# Patient Record
Sex: MALE | Race: WHITE | HISPANIC OR LATINO | URBAN - METROPOLITAN AREA
[De-identification: names, ages, dates, MRNs, and addresses within clinical notes are randomized per-mention and may not be internally consistent; named-entity substitution may affect disease eponyms.]

---

## 2017-10-12 ENCOUNTER — INPATIENT (INPATIENT)
Facility: HOSPITAL | Age: 77
LOS: 2 days | Discharge: HOME | DRG: 308 | End: 2017-10-15
Attending: INTERNAL MEDICINE | Admitting: INTERNAL MEDICINE
Payer: MEDICARE

## 2017-10-12 VITALS
SYSTOLIC BLOOD PRESSURE: 161 MMHG | OXYGEN SATURATION: 96 % | RESPIRATION RATE: 29 BRPM | TEMPERATURE: 98 F | DIASTOLIC BLOOD PRESSURE: 104 MMHG | HEIGHT: 70 IN | WEIGHT: 182.98 LBS | HEART RATE: 158 BPM

## 2017-10-12 DIAGNOSIS — I50.9 HEART FAILURE, UNSPECIFIED: ICD-10-CM

## 2017-10-12 DIAGNOSIS — I10 ESSENTIAL (PRIMARY) HYPERTENSION: ICD-10-CM

## 2017-10-12 DIAGNOSIS — E11.9 TYPE 2 DIABETES MELLITUS WITHOUT COMPLICATIONS: ICD-10-CM

## 2017-10-12 DIAGNOSIS — J96.01 ACUTE RESPIRATORY FAILURE WITH HYPOXIA: ICD-10-CM

## 2017-10-12 DIAGNOSIS — R06.02 SHORTNESS OF BREATH: ICD-10-CM

## 2017-10-12 DIAGNOSIS — Z95.0 PRESENCE OF CARDIAC PACEMAKER: Chronic | ICD-10-CM

## 2017-10-12 DIAGNOSIS — Z95.5 PRESENCE OF CORONARY ANGIOPLASTY IMPLANT AND GRAFT: ICD-10-CM

## 2017-10-12 DIAGNOSIS — Z98.890 OTHER SPECIFIED POSTPROCEDURAL STATES: Chronic | ICD-10-CM

## 2017-10-12 DIAGNOSIS — I48.91 UNSPECIFIED ATRIAL FIBRILLATION: ICD-10-CM

## 2017-10-12 DIAGNOSIS — I95.9 HYPOTENSION, UNSPECIFIED: ICD-10-CM

## 2017-10-12 DIAGNOSIS — I50.23 ACUTE ON CHRONIC SYSTOLIC (CONGESTIVE) HEART FAILURE: ICD-10-CM

## 2017-10-12 DIAGNOSIS — F17.200 NICOTINE DEPENDENCE, UNSPECIFIED, UNCOMPLICATED: ICD-10-CM

## 2017-10-12 DIAGNOSIS — J44.9 CHRONIC OBSTRUCTIVE PULMONARY DISEASE, UNSPECIFIED: ICD-10-CM

## 2017-10-12 LAB
ALBUMIN SERPL ELPH-MCNC: 4 G/DL — SIGNIFICANT CHANGE UP (ref 3.3–5)
ALP SERPL-CCNC: 94 U/L — SIGNIFICANT CHANGE UP (ref 30–120)
ALT FLD-CCNC: 27 U/L DA — SIGNIFICANT CHANGE UP (ref 10–60)
ANION GAP SERPL CALC-SCNC: 13 MMOL/L — SIGNIFICANT CHANGE UP (ref 5–17)
APPEARANCE UR: CLEAR — SIGNIFICANT CHANGE UP
APTT BLD: 59.2 SEC — HIGH (ref 27.5–37.4)
AST SERPL-CCNC: 20 U/L — SIGNIFICANT CHANGE UP (ref 10–40)
BASE EXCESS BLDA CALC-SCNC: -1 MMOL/L — SIGNIFICANT CHANGE UP (ref -2–2)
BASOPHILS # BLD AUTO: 0.1 K/UL — SIGNIFICANT CHANGE UP (ref 0–0.2)
BASOPHILS NFR BLD AUTO: 0.9 % — SIGNIFICANT CHANGE UP (ref 0–2)
BILIRUB SERPL-MCNC: 1.7 MG/DL — HIGH (ref 0.2–1.2)
BILIRUB UR-MCNC: NEGATIVE — SIGNIFICANT CHANGE UP
BLOOD GAS SOURCE: SIGNIFICANT CHANGE UP
BUN SERPL-MCNC: 23 MG/DL — SIGNIFICANT CHANGE UP (ref 7–23)
CALCIUM SERPL-MCNC: 9.1 MG/DL — SIGNIFICANT CHANGE UP (ref 8.4–10.5)
CHLORIDE SERPL-SCNC: 105 MMOL/L — SIGNIFICANT CHANGE UP (ref 96–108)
CK MB BLD-MCNC: 2.5 % — SIGNIFICANT CHANGE UP (ref 0–3.5)
CK MB CFR SERPL CALC: 1.3 NG/ML — SIGNIFICANT CHANGE UP (ref 0–3.6)
CK SERPL-CCNC: 45 U/L — SIGNIFICANT CHANGE UP (ref 39–308)
CK SERPL-CCNC: 51 U/L — SIGNIFICANT CHANGE UP (ref 39–308)
CK SERPL-CCNC: 58 U/L — SIGNIFICANT CHANGE UP (ref 39–308)
CO2 SERPL-SCNC: 23 MMOL/L — SIGNIFICANT CHANGE UP (ref 22–31)
COLOR SPEC: YELLOW — SIGNIFICANT CHANGE UP
CREAT SERPL-MCNC: 1.68 MG/DL — HIGH (ref 0.5–1.3)
DIFF PNL FLD: NEGATIVE — SIGNIFICANT CHANGE UP
EOSINOPHIL # BLD AUTO: 0.4 K/UL — SIGNIFICANT CHANGE UP (ref 0–0.5)
EOSINOPHIL NFR BLD AUTO: 2.8 % — SIGNIFICANT CHANGE UP (ref 0–6)
GLUCOSE BLDC GLUCOMTR-MCNC: 162 MG/DL — HIGH (ref 70–99)
GLUCOSE SERPL-MCNC: 197 MG/DL — HIGH (ref 70–99)
GLUCOSE UR QL: NEGATIVE MG/DL — SIGNIFICANT CHANGE UP
HCO3 BLDA-SCNC: 24 MMOL/L — SIGNIFICANT CHANGE UP (ref 21–29)
HCT VFR BLD CALC: 52.8 % — HIGH (ref 39–50)
HGB BLD-MCNC: 17.7 G/DL — HIGH (ref 13–17)
HOROWITZ INDEX BLDA+IHG-RTO: 45 — SIGNIFICANT CHANGE UP
INR BLD: 3.35 RATIO — HIGH (ref 0.88–1.16)
KETONES UR-MCNC: NEGATIVE — SIGNIFICANT CHANGE UP
LEUKOCYTE ESTERASE UR-ACNC: NEGATIVE — SIGNIFICANT CHANGE UP
LYMPHOCYTES # BLD AUTO: 40.1 % — SIGNIFICANT CHANGE UP (ref 13–44)
LYMPHOCYTES # BLD AUTO: 5.7 K/UL — HIGH (ref 1–3.3)
MCHC RBC-ENTMCNC: 32.2 PG — SIGNIFICANT CHANGE UP (ref 27–34)
MCHC RBC-ENTMCNC: 33.6 GM/DL — SIGNIFICANT CHANGE UP (ref 32–36)
MCV RBC AUTO: 95.9 FL — SIGNIFICANT CHANGE UP (ref 80–100)
MONOCYTES # BLD AUTO: 1.1 K/UL — HIGH (ref 0–0.9)
MONOCYTES NFR BLD AUTO: 7.9 % — SIGNIFICANT CHANGE UP (ref 2–14)
NEUTROPHILS # BLD AUTO: 6.9 K/UL — SIGNIFICANT CHANGE UP (ref 1.8–7.4)
NEUTROPHILS NFR BLD AUTO: 48.3 % — SIGNIFICANT CHANGE UP (ref 43–77)
NITRITE UR-MCNC: NEGATIVE — SIGNIFICANT CHANGE UP
NT-PROBNP SERPL-SCNC: 6166 PG/ML — HIGH (ref 0–450)
PCO2 BLDA: 34 MMHG — SIGNIFICANT CHANGE UP (ref 32–46)
PH BLD: 7.44 — SIGNIFICANT CHANGE UP (ref 7.35–7.45)
PH UR: 5 — SIGNIFICANT CHANGE UP (ref 5–8)
PLATELET # BLD AUTO: 187 K/UL — SIGNIFICANT CHANGE UP (ref 150–400)
PO2 BLDA: 187 MMHG — HIGH (ref 74–108)
POTASSIUM SERPL-MCNC: 4.4 MMOL/L — SIGNIFICANT CHANGE UP (ref 3.5–5.3)
POTASSIUM SERPL-SCNC: 4.4 MMOL/L — SIGNIFICANT CHANGE UP (ref 3.5–5.3)
PROT SERPL-MCNC: 7.6 G/DL — SIGNIFICANT CHANGE UP (ref 6–8.3)
PROT UR-MCNC: 30 MG/DL
PROTHROM AB SERPL-ACNC: 37.4 SEC — HIGH (ref 9.8–12.7)
RBC # BLD: 5.51 M/UL — SIGNIFICANT CHANGE UP (ref 4.2–5.8)
RBC # FLD: 14 % — SIGNIFICANT CHANGE UP (ref 10.3–14.5)
SAO2 % BLDA: 99 % — HIGH (ref 92–96)
SODIUM SERPL-SCNC: 141 MMOL/L — SIGNIFICANT CHANGE UP (ref 135–145)
SP GR SPEC: 1.01 — SIGNIFICANT CHANGE UP (ref 1.01–1.02)
TROPONIN I SERPL-MCNC: 0 NG/ML — LOW (ref 0.02–0.06)
TROPONIN I SERPL-MCNC: 0.02 NG/ML — LOW (ref 0.02–0.06)
TROPONIN I SERPL-MCNC: 0.03 NG/ML — SIGNIFICANT CHANGE UP (ref 0.02–0.06)
UROBILINOGEN FLD QL: NEGATIVE MG/DL — SIGNIFICANT CHANGE UP
WBC # BLD: 14.3 K/UL — HIGH (ref 3.8–10.5)
WBC # FLD AUTO: 14.3 K/UL — HIGH (ref 3.8–10.5)

## 2017-10-12 PROCEDURE — 71010: CPT | Mod: 26

## 2017-10-12 PROCEDURE — 93010 ELECTROCARDIOGRAM REPORT: CPT | Mod: 76

## 2017-10-12 PROCEDURE — 99291 CRITICAL CARE FIRST HOUR: CPT

## 2017-10-12 PROCEDURE — 93306 TTE W/DOPPLER COMPLETE: CPT | Mod: 26

## 2017-10-12 PROCEDURE — 99223 1ST HOSP IP/OBS HIGH 75: CPT

## 2017-10-12 RX ORDER — LISINOPRIL 2.5 MG/1
5 TABLET ORAL DAILY
Qty: 0 | Refills: 0 | Status: DISCONTINUED | OUTPATIENT
Start: 2017-10-12 | End: 2017-10-15

## 2017-10-12 RX ORDER — METOPROLOL TARTRATE 50 MG
100 TABLET ORAL DAILY
Qty: 0 | Refills: 0 | Status: DISCONTINUED | OUTPATIENT
Start: 2017-10-12 | End: 2017-10-12

## 2017-10-12 RX ORDER — LISINOPRIL 2.5 MG/1
20 TABLET ORAL DAILY
Qty: 0 | Refills: 0 | Status: DISCONTINUED | OUTPATIENT
Start: 2017-10-12 | End: 2017-10-12

## 2017-10-12 RX ORDER — DEXTROSE 50 % IN WATER 50 %
12.5 SYRINGE (ML) INTRAVENOUS ONCE
Qty: 0 | Refills: 0 | Status: DISCONTINUED | OUTPATIENT
Start: 2017-10-12 | End: 2017-10-15

## 2017-10-12 RX ORDER — BUDESONIDE AND FORMOTEROL FUMARATE DIHYDRATE 160; 4.5 UG/1; UG/1
2 AEROSOL RESPIRATORY (INHALATION)
Qty: 0 | Refills: 0 | Status: DISCONTINUED | OUTPATIENT
Start: 2017-10-12 | End: 2017-10-15

## 2017-10-12 RX ORDER — SODIUM CHLORIDE 9 MG/ML
3 INJECTION INTRAMUSCULAR; INTRAVENOUS; SUBCUTANEOUS ONCE
Qty: 0 | Refills: 0 | Status: COMPLETED | OUTPATIENT
Start: 2017-10-12 | End: 2017-10-12

## 2017-10-12 RX ORDER — ATORVASTATIN CALCIUM 80 MG/1
80 TABLET, FILM COATED ORAL AT BEDTIME
Qty: 0 | Refills: 0 | Status: DISCONTINUED | OUTPATIENT
Start: 2017-10-12 | End: 2017-10-15

## 2017-10-12 RX ORDER — AMIODARONE HYDROCHLORIDE 400 MG/1
200 TABLET ORAL
Qty: 0 | Refills: 0 | Status: DISCONTINUED | OUTPATIENT
Start: 2017-10-12 | End: 2017-10-14

## 2017-10-12 RX ORDER — DIGOXIN 250 MCG
0.25 TABLET ORAL ONCE
Qty: 0 | Refills: 0 | Status: COMPLETED | OUTPATIENT
Start: 2017-10-12 | End: 2017-10-12

## 2017-10-12 RX ORDER — INSULIN LISPRO 100/ML
VIAL (ML) SUBCUTANEOUS
Qty: 0 | Refills: 0 | Status: DISCONTINUED | OUTPATIENT
Start: 2017-10-12 | End: 2017-10-15

## 2017-10-12 RX ORDER — METOPROLOL TARTRATE 50 MG
50 TABLET ORAL EVERY 12 HOURS
Qty: 0 | Refills: 0 | Status: DISCONTINUED | OUTPATIENT
Start: 2017-10-12 | End: 2017-10-13

## 2017-10-12 RX ORDER — AMIODARONE HYDROCHLORIDE 400 MG/1
100 TABLET ORAL
Qty: 0 | Refills: 0 | Status: DISCONTINUED | OUTPATIENT
Start: 2017-10-12 | End: 2017-10-12

## 2017-10-12 RX ORDER — SODIUM CHLORIDE 9 MG/ML
1000 INJECTION, SOLUTION INTRAVENOUS
Qty: 0 | Refills: 0 | Status: DISCONTINUED | OUTPATIENT
Start: 2017-10-12 | End: 2017-10-15

## 2017-10-12 RX ORDER — TIOTROPIUM BROMIDE 18 UG/1
1 CAPSULE ORAL; RESPIRATORY (INHALATION) DAILY
Qty: 0 | Refills: 0 | Status: DISCONTINUED | OUTPATIENT
Start: 2017-10-12 | End: 2017-10-15

## 2017-10-12 RX ORDER — GLUCAGON INJECTION, SOLUTION 0.5 MG/.1ML
1 INJECTION, SOLUTION SUBCUTANEOUS ONCE
Qty: 0 | Refills: 0 | Status: DISCONTINUED | OUTPATIENT
Start: 2017-10-12 | End: 2017-10-15

## 2017-10-12 RX ORDER — DEXTROSE 50 % IN WATER 50 %
25 SYRINGE (ML) INTRAVENOUS ONCE
Qty: 0 | Refills: 0 | Status: DISCONTINUED | OUTPATIENT
Start: 2017-10-12 | End: 2017-10-15

## 2017-10-12 RX ORDER — FUROSEMIDE 40 MG
80 TABLET ORAL ONCE
Qty: 0 | Refills: 0 | Status: COMPLETED | OUTPATIENT
Start: 2017-10-12 | End: 2017-10-12

## 2017-10-12 RX ORDER — ASPIRIN/CALCIUM CARB/MAGNESIUM 324 MG
81 TABLET ORAL DAILY
Qty: 0 | Refills: 0 | Status: DISCONTINUED | OUTPATIENT
Start: 2017-10-12 | End: 2017-10-15

## 2017-10-12 RX ORDER — FUROSEMIDE 40 MG
40 TABLET ORAL DAILY
Qty: 0 | Refills: 0 | Status: DISCONTINUED | OUTPATIENT
Start: 2017-10-12 | End: 2017-10-13

## 2017-10-12 RX ORDER — DEXTROSE 50 % IN WATER 50 %
1 SYRINGE (ML) INTRAVENOUS ONCE
Qty: 0 | Refills: 0 | Status: DISCONTINUED | OUTPATIENT
Start: 2017-10-12 | End: 2017-10-15

## 2017-10-12 RX ADMIN — SODIUM CHLORIDE 3 MILLILITER(S): 9 INJECTION INTRAMUSCULAR; INTRAVENOUS; SUBCUTANEOUS at 04:48

## 2017-10-12 RX ADMIN — Medication 50 MILLIGRAM(S): at 17:39

## 2017-10-12 RX ADMIN — ATORVASTATIN CALCIUM 80 MILLIGRAM(S): 80 TABLET, FILM COATED ORAL at 21:27

## 2017-10-12 RX ADMIN — Medication 0.25 MILLIGRAM(S): at 10:42

## 2017-10-12 RX ADMIN — AMIODARONE HYDROCHLORIDE 100 MILLIGRAM(S): 400 TABLET ORAL at 06:50

## 2017-10-12 RX ADMIN — Medication 40 MILLIGRAM(S): at 17:40

## 2017-10-12 RX ADMIN — Medication 80 MILLIGRAM(S): at 04:20

## 2017-10-12 RX ADMIN — Medication 81 MILLIGRAM(S): at 13:17

## 2017-10-12 NOTE — ED PROVIDER NOTE - PMH
Atrial fibrillation    Bronchitis    Cardiac arrhythmia    COPD (chronic obstructive pulmonary disease)    Diabetes    Hyperlipidemia    Pacemaker Asthma    Atrial fibrillation    Bronchitis    Cardiac arrhythmia    CHF (congestive heart failure)    COPD (chronic obstructive pulmonary disease)    Diabetes    Hyperlipidemia    MI, old    Pacemaker    Stented coronary artery

## 2017-10-12 NOTE — ED PROVIDER NOTE - MEDICAL DECISION MAKING DETAILS
Hugo yJovitao. M BIBEMS for SOB - 77 y.o. M BIBEMS for SOB - not relieved by inhalers for COPD - in ED noted to be in AFib with RVR in 160-170s and respiratory distress - responded well to Bipap/diltiazem - appears to be CHF exacerbation - to be admitted

## 2017-10-12 NOTE — CONSULT NOTE ADULT - PROBLEM SELECTOR RECOMMENDATION 2
due to non compliant to diet , underlying systolic dysfunction with afib with rapid ventricular rate , will increase amiodarone dose 200 mg po BID for 3 days then 200 mg po daily , add digoxin if heart rate continue to be high . IV diuresis with parameter
Cannot exclude HTN as precipitating factor, as well as a-fib. Routine diuresis as BP tolerates. Needs new TTE, last one on record was from 2015. Trops neg x1, trend. BNP: 6166. BP and HR control as necessary.
Cardiology f/u  Diurese  CXR

## 2017-10-12 NOTE — CONSULT NOTE ADULT - PROBLEM SELECTOR RECOMMENDATION 3
May have put patient into acute heart failure. Now rate controlled after IV cardizem. Continue to monitor. Start PO amio as patient takes this at home and it will help with rate/rhythm control, especially in setting of hypotension. May have put patient into acute heart failure. Now rate controlled after IV cardizem. Continue to monitor. Start PO amio as patient takes this at home and it will help with rate/rhythm control, especially in setting of hypotension. INR: 3.35.

## 2017-10-12 NOTE — ED PROVIDER NOTE - CRITICAL CARE PROVIDED
direct patient care (not related to procedure)/documentation/consult w/ pt's family directly relating to pts condition/additional history taking/interpretation of diagnostic studies/consultation with other physicians

## 2017-10-12 NOTE — CONSULT NOTE ADULT - PROBLEM SELECTOR RECOMMENDATION 3
will increase amiodarone dose , will add digoxin , continue anticoagulation , hold if INR >3.0 ,Target INR 2-3

## 2017-10-12 NOTE — PATIENT PROFILE ADULT. - PMH
Asthma    Atrial fibrillation    Bronchitis    Cardiac arrhythmia    CHF (congestive heart failure)    COPD (chronic obstructive pulmonary disease)    Diabetes    Hyperlipidemia    MI, old    Pacemaker    Stented coronary artery    Stroke

## 2017-10-12 NOTE — H&P ADULT - PROBLEM SELECTOR PLAN 2
iv lasix  check echo  [facility] - will get report  cardio eval noted  monitor on tele

## 2017-10-12 NOTE — CONSULT NOTE ADULT - ASSESSMENT
76 y/o M with a h/o COPD, current smoker, carotid stenosis, prior CVA, CAD (s/p PCI), CHF, PPM, a-fib (on coumadin), HTN, HLD, DM with acute hypoxic respiratory failure requiring BiPAP, acute decompensated heart failure, a-fib with fast rate, hypotension.    Patient has shown improvement with BiPAP application and diuresis.

## 2017-10-12 NOTE — CONSULT NOTE ADULT - PROBLEM SELECTOR RECOMMENDATION 4
no evidence of myocardial injury , his LV dysfunction possibly due to tachycardiac associated cardiomyopathy , will follow up echo , ekg
Cannot exclude relation to CCB and high dose diuretic. Continue to monitor for now. IVF with caution if necessary. F/u TTE.
Watch bp

## 2017-10-12 NOTE — CONSULT NOTE ADULT - PROBLEM SELECTOR PROBLEM 2
Acute on chronic systolic congestive heart failure
Acute decompensated heart failure
Acute decompensated heart failure

## 2017-10-12 NOTE — CONSULT NOTE ADULT - ASSESSMENT
76 y/o M with a h/o COPD, current smoker, carotid stenosis, prior CVA, CAD (s/p PCI), CHF, PPM, a-fib (on coumadin), HTN, HLD, DM with acute hypoxic respiratory failure requiring BiPAP, acute decompensated heart failure, a-fib with RVR, hypotension.    Patient has shown improvement with BiPAP application and diuresis. Has not been in ED long- will reevaluate and trial off BiPAP. May not require SPCU admission.

## 2017-10-12 NOTE — ED PROVIDER NOTE - OBJECTIVE STATEMENT
77 y.o. M BIBEMS for difficulty breathing - pt with h/o COPD and Afib, never CHF 77 y.o. M BIBEMS for difficulty breathing - pt with h/o COPD and Afib, never CHF per wife - pt unable to provide history due to respiratory distress - pt was asleep, awoke with dyspnea - used his spiriva and albuterol, not improving, called 911 - EMS gave atrovent and O2 without improvement - pt c/o difficulty breathing and needing to have BM. [facility]

## 2017-10-12 NOTE — ED ADULT NURSE NOTE - PMH
Bronchitis    Cardiac arrhythmia    COPD (chronic obstructive pulmonary disease)    Diabetes    Hyperlipidemia    Pacemaker Asthma    Atrial fibrillation    Bronchitis    Cardiac arrhythmia    COPD (chronic obstructive pulmonary disease)    Diabetes    Hyperlipidemia    Pacemaker Asthma    Atrial fibrillation    Bronchitis    Cardiac arrhythmia    COPD (chronic obstructive pulmonary disease)    Diabetes    Hyperlipidemia    MI, old    Pacemaker

## 2017-10-12 NOTE — ED PROVIDER NOTE - CARE PLAN
Principal Discharge DX:	Acute congestive heart failure, unspecified congestive heart failure type  Secondary Diagnosis:	Atrial fibrillation with RVR

## 2017-10-13 DIAGNOSIS — I25.10 ATHEROSCLEROTIC HEART DISEASE OF NATIVE CORONARY ARTERY WITHOUT ANGINA PECTORIS: ICD-10-CM

## 2017-10-13 LAB
ALBUMIN SERPL ELPH-MCNC: 3.5 G/DL — SIGNIFICANT CHANGE UP (ref 3.3–5)
ALP SERPL-CCNC: 79 U/L — SIGNIFICANT CHANGE UP (ref 30–120)
ALT FLD-CCNC: 20 U/L DA — SIGNIFICANT CHANGE UP (ref 10–60)
ANION GAP SERPL CALC-SCNC: 10 MMOL/L — SIGNIFICANT CHANGE UP (ref 5–17)
AST SERPL-CCNC: 17 U/L — SIGNIFICANT CHANGE UP (ref 10–40)
BILIRUB SERPL-MCNC: 2.3 MG/DL — HIGH (ref 0.2–1.2)
BUN SERPL-MCNC: 29 MG/DL — HIGH (ref 7–23)
CALCIUM SERPL-MCNC: 8.8 MG/DL — SIGNIFICANT CHANGE UP (ref 8.4–10.5)
CHLORIDE SERPL-SCNC: 104 MMOL/L — SIGNIFICANT CHANGE UP (ref 96–108)
CK SERPL-CCNC: 55 U/L — SIGNIFICANT CHANGE UP (ref 39–308)
CO2 SERPL-SCNC: 25 MMOL/L — SIGNIFICANT CHANGE UP (ref 22–31)
CREAT SERPL-MCNC: 1.73 MG/DL — HIGH (ref 0.5–1.3)
CULTURE RESULTS: SIGNIFICANT CHANGE UP
GLUCOSE BLDC GLUCOMTR-MCNC: 146 MG/DL — HIGH (ref 70–99)
GLUCOSE BLDC GLUCOMTR-MCNC: 177 MG/DL — HIGH (ref 70–99)
GLUCOSE BLDC GLUCOMTR-MCNC: 201 MG/DL — HIGH (ref 70–99)
GLUCOSE BLDC GLUCOMTR-MCNC: 362 MG/DL — HIGH (ref 70–99)
GLUCOSE BLDC GLUCOMTR-MCNC: 365 MG/DL — HIGH (ref 70–99)
GLUCOSE SERPL-MCNC: 183 MG/DL — HIGH (ref 70–99)
HBA1C BLD-MCNC: 6.4 % — HIGH (ref 4–5.6)
HCT VFR BLD CALC: 52.8 % — HIGH (ref 39–50)
HGB BLD-MCNC: 16.6 G/DL — SIGNIFICANT CHANGE UP (ref 13–17)
INR BLD: 2.86 RATIO — HIGH (ref 0.88–1.16)
MAGNESIUM SERPL-MCNC: 2.1 MG/DL — SIGNIFICANT CHANGE UP (ref 1.6–2.6)
MCHC RBC-ENTMCNC: 30.8 PG — SIGNIFICANT CHANGE UP (ref 27–34)
MCHC RBC-ENTMCNC: 31.5 GM/DL — LOW (ref 32–36)
MCV RBC AUTO: 97.8 FL — SIGNIFICANT CHANGE UP (ref 80–100)
PLATELET # BLD AUTO: 153 K/UL — SIGNIFICANT CHANGE UP (ref 150–400)
POTASSIUM SERPL-MCNC: 4.3 MMOL/L — SIGNIFICANT CHANGE UP (ref 3.5–5.3)
POTASSIUM SERPL-SCNC: 4.3 MMOL/L — SIGNIFICANT CHANGE UP (ref 3.5–5.3)
PROT SERPL-MCNC: 7.1 G/DL — SIGNIFICANT CHANGE UP (ref 6–8.3)
PROTHROM AB SERPL-ACNC: 31.9 SEC — HIGH (ref 9.8–12.7)
RBC # BLD: 5.4 M/UL — SIGNIFICANT CHANGE UP (ref 4.2–5.8)
RBC # FLD: 14.1 % — SIGNIFICANT CHANGE UP (ref 10.3–14.5)
SODIUM SERPL-SCNC: 139 MMOL/L — SIGNIFICANT CHANGE UP (ref 135–145)
SPECIMEN SOURCE: SIGNIFICANT CHANGE UP
TROPONIN I SERPL-MCNC: 0.01 NG/ML — LOW (ref 0.02–0.06)
WBC # BLD: 10.4 K/UL — SIGNIFICANT CHANGE UP (ref 3.8–10.5)
WBC # FLD AUTO: 10.4 K/UL — SIGNIFICANT CHANGE UP (ref 3.8–10.5)

## 2017-10-13 PROCEDURE — 71010: CPT | Mod: 26

## 2017-10-13 PROCEDURE — 99233 SBSQ HOSP IP/OBS HIGH 50: CPT

## 2017-10-13 RX ORDER — METOPROLOL TARTRATE 50 MG
75 TABLET ORAL EVERY 12 HOURS
Qty: 0 | Refills: 0 | Status: DISCONTINUED | OUTPATIENT
Start: 2017-10-13 | End: 2017-10-15

## 2017-10-13 RX ORDER — NICOTINE POLACRILEX 2 MG
1 GUM BUCCAL DAILY
Qty: 0 | Refills: 0 | Status: DISCONTINUED | OUTPATIENT
Start: 2017-10-13 | End: 2017-10-15

## 2017-10-13 RX ORDER — FUROSEMIDE 40 MG
40 TABLET ORAL DAILY
Qty: 0 | Refills: 0 | Status: DISCONTINUED | OUTPATIENT
Start: 2017-10-14 | End: 2017-10-14

## 2017-10-13 RX ORDER — DIGOXIN 250 MCG
0.12 TABLET ORAL DAILY
Qty: 0 | Refills: 0 | Status: DISCONTINUED | OUTPATIENT
Start: 2017-10-13 | End: 2017-10-15

## 2017-10-13 RX ORDER — METOPROLOL TARTRATE 50 MG
25 TABLET ORAL ONCE
Qty: 0 | Refills: 0 | Status: COMPLETED | OUTPATIENT
Start: 2017-10-13 | End: 2017-10-13

## 2017-10-13 RX ADMIN — Medication 50 MILLIGRAM(S): at 05:49

## 2017-10-13 RX ADMIN — BUDESONIDE AND FORMOTEROL FUMARATE DIHYDRATE 2 PUFF(S): 160; 4.5 AEROSOL RESPIRATORY (INHALATION) at 18:10

## 2017-10-13 RX ADMIN — Medication 40 MILLIGRAM(S): at 05:49

## 2017-10-13 RX ADMIN — BUDESONIDE AND FORMOTEROL FUMARATE DIHYDRATE 2 PUFF(S): 160; 4.5 AEROSOL RESPIRATORY (INHALATION) at 08:06

## 2017-10-13 RX ADMIN — Medication 4: at 08:59

## 2017-10-13 RX ADMIN — Medication 0.12 MILLIGRAM(S): at 12:18

## 2017-10-13 RX ADMIN — Medication 81 MILLIGRAM(S): at 11:33

## 2017-10-13 RX ADMIN — Medication 10: at 12:25

## 2017-10-13 RX ADMIN — LISINOPRIL 5 MILLIGRAM(S): 2.5 TABLET ORAL at 05:49

## 2017-10-13 RX ADMIN — ATORVASTATIN CALCIUM 80 MILLIGRAM(S): 80 TABLET, FILM COATED ORAL at 21:13

## 2017-10-13 RX ADMIN — Medication 75 MILLIGRAM(S): at 17:25

## 2017-10-13 RX ADMIN — Medication 1 PATCH: at 11:33

## 2017-10-13 RX ADMIN — Medication 40 MILLIGRAM(S): at 07:00

## 2017-10-13 RX ADMIN — TIOTROPIUM BROMIDE 1 CAPSULE(S): 18 CAPSULE ORAL; RESPIRATORY (INHALATION) at 08:06

## 2017-10-13 RX ADMIN — Medication 25 MILLIGRAM(S): at 12:18

## 2017-10-13 NOTE — DIETITIAN INITIAL EVALUATION ADULT. - OTHER INFO
Admitted for acute CHF.  Elevated blood glucose possibly due to DM and/or hospitalization. Patient's wife at bedside. Patient reports good appetite, and consuming approximately 75% of meals. Patient reports height as 5'10" and a usual body weight of 183#.  Patient states that he does not do any fingersticks at home to monitor blood glucose. Patient reports that he limits his sugar intake but does not limit salt, and does not adhere to a consistent CHO diet at home. Provided patient with verbal and written diabetes education and materials. No edema, no pressure ulcers, skin intact.

## 2017-10-14 DIAGNOSIS — E11.65 TYPE 2 DIABETES MELLITUS WITH HYPERGLYCEMIA: ICD-10-CM

## 2017-10-14 LAB
ALBUMIN SERPL ELPH-MCNC: 3.5 G/DL — SIGNIFICANT CHANGE UP (ref 3.3–5)
ALP SERPL-CCNC: 72 U/L — SIGNIFICANT CHANGE UP (ref 30–120)
ALT FLD-CCNC: 29 U/L DA — SIGNIFICANT CHANGE UP (ref 10–60)
ANION GAP SERPL CALC-SCNC: 9 MMOL/L — SIGNIFICANT CHANGE UP (ref 5–17)
APTT BLD: 38.2 SEC — HIGH (ref 27.5–37.4)
AST SERPL-CCNC: 22 U/L — SIGNIFICANT CHANGE UP (ref 10–40)
BILIRUB SERPL-MCNC: 1.5 MG/DL — HIGH (ref 0.2–1.2)
BUN SERPL-MCNC: 39 MG/DL — HIGH (ref 7–23)
CALCIUM SERPL-MCNC: 8.8 MG/DL — SIGNIFICANT CHANGE UP (ref 8.4–10.5)
CHLORIDE SERPL-SCNC: 105 MMOL/L — SIGNIFICANT CHANGE UP (ref 96–108)
CO2 SERPL-SCNC: 26 MMOL/L — SIGNIFICANT CHANGE UP (ref 22–31)
CREAT SERPL-MCNC: 1.98 MG/DL — HIGH (ref 0.5–1.3)
GLUCOSE BLDC GLUCOMTR-MCNC: 150 MG/DL — HIGH (ref 70–99)
GLUCOSE BLDC GLUCOMTR-MCNC: 152 MG/DL — HIGH (ref 70–99)
GLUCOSE BLDC GLUCOMTR-MCNC: 171 MG/DL — HIGH (ref 70–99)
GLUCOSE BLDC GLUCOMTR-MCNC: 192 MG/DL — HIGH (ref 70–99)
GLUCOSE SERPL-MCNC: 164 MG/DL — HIGH (ref 70–99)
HCT VFR BLD CALC: 47.4 % — SIGNIFICANT CHANGE UP (ref 39–50)
HGB BLD-MCNC: 15.6 G/DL — SIGNIFICANT CHANGE UP (ref 13–17)
INR BLD: 2.26 RATIO — HIGH (ref 0.88–1.16)
MCHC RBC-ENTMCNC: 31.8 PG — SIGNIFICANT CHANGE UP (ref 27–34)
MCHC RBC-ENTMCNC: 32.9 GM/DL — SIGNIFICANT CHANGE UP (ref 32–36)
MCV RBC AUTO: 96.9 FL — SIGNIFICANT CHANGE UP (ref 80–100)
PLATELET # BLD AUTO: 158 K/UL — SIGNIFICANT CHANGE UP (ref 150–400)
POTASSIUM SERPL-MCNC: 4.6 MMOL/L — SIGNIFICANT CHANGE UP (ref 3.5–5.3)
POTASSIUM SERPL-SCNC: 4.6 MMOL/L — SIGNIFICANT CHANGE UP (ref 3.5–5.3)
PROT SERPL-MCNC: 7 G/DL — SIGNIFICANT CHANGE UP (ref 6–8.3)
PROTHROM AB SERPL-ACNC: 25.1 SEC — HIGH (ref 9.8–12.7)
RBC # BLD: 4.9 M/UL — SIGNIFICANT CHANGE UP (ref 4.2–5.8)
RBC # FLD: 14.2 % — SIGNIFICANT CHANGE UP (ref 10.3–14.5)
SODIUM SERPL-SCNC: 140 MMOL/L — SIGNIFICANT CHANGE UP (ref 135–145)
WBC # BLD: 17.7 K/UL — HIGH (ref 3.8–10.5)
WBC # FLD AUTO: 17.7 K/UL — HIGH (ref 3.8–10.5)

## 2017-10-14 PROCEDURE — 99233 SBSQ HOSP IP/OBS HIGH 50: CPT

## 2017-10-14 PROCEDURE — 71010: CPT | Mod: 26

## 2017-10-14 RX ORDER — AMIODARONE HYDROCHLORIDE 400 MG/1
200 TABLET ORAL DAILY
Qty: 0 | Refills: 0 | Status: DISCONTINUED | OUTPATIENT
Start: 2017-10-14 | End: 2017-10-15

## 2017-10-14 RX ORDER — WARFARIN SODIUM 2.5 MG/1
2.5 TABLET ORAL ONCE
Qty: 0 | Refills: 0 | Status: COMPLETED | OUTPATIENT
Start: 2017-10-14 | End: 2017-10-14

## 2017-10-14 RX ADMIN — Medication 75 MILLIGRAM(S): at 05:52

## 2017-10-14 RX ADMIN — Medication 75 MILLIGRAM(S): at 17:22

## 2017-10-14 RX ADMIN — WARFARIN SODIUM 2.5 MILLIGRAM(S): 2.5 TABLET ORAL at 21:25

## 2017-10-14 RX ADMIN — Medication 0.12 MILLIGRAM(S): at 05:52

## 2017-10-14 RX ADMIN — Medication 1 PATCH: at 11:23

## 2017-10-14 RX ADMIN — Medication 20 MILLIGRAM(S): at 05:53

## 2017-10-14 RX ADMIN — Medication 81 MILLIGRAM(S): at 11:23

## 2017-10-14 RX ADMIN — BUDESONIDE AND FORMOTEROL FUMARATE DIHYDRATE 2 PUFF(S): 160; 4.5 AEROSOL RESPIRATORY (INHALATION) at 08:55

## 2017-10-14 RX ADMIN — BUDESONIDE AND FORMOTEROL FUMARATE DIHYDRATE 2 PUFF(S): 160; 4.5 AEROSOL RESPIRATORY (INHALATION) at 18:00

## 2017-10-14 RX ADMIN — Medication 40 MILLIGRAM(S): at 05:52

## 2017-10-14 RX ADMIN — ATORVASTATIN CALCIUM 80 MILLIGRAM(S): 80 TABLET, FILM COATED ORAL at 21:25

## 2017-10-14 RX ADMIN — Medication 2: at 08:54

## 2017-10-14 RX ADMIN — TIOTROPIUM BROMIDE 1 CAPSULE(S): 18 CAPSULE ORAL; RESPIRATORY (INHALATION) at 08:55

## 2017-10-14 RX ADMIN — AMIODARONE HYDROCHLORIDE 200 MILLIGRAM(S): 400 TABLET ORAL at 11:23

## 2017-10-14 RX ADMIN — Medication 2: at 12:58

## 2017-10-14 RX ADMIN — LISINOPRIL 5 MILLIGRAM(S): 2.5 TABLET ORAL at 05:53

## 2017-10-14 NOTE — PROGRESS NOTE ADULT - PROBLEM SELECTOR PLAN 6
riss  glycemic control  endocrine consult called as pt is off metformin due to slightly worse cr. will need new po med for home.

## 2017-10-14 NOTE — CONSULT NOTE ADULT - SUBJECTIVE AND OBJECTIVE BOX
Patient is a 77y old  Male who presents with a chief complaint of Resp distress (12 Oct 2017 11:20)      Reason For Consult:     HPI:  77 y.o. M BIBEMS for difficulty breathing - pt with h/o COPD and Afib, never CHF per wife - pt unable to provide history due to respiratory distress - pt was asleep, awoke with dyspnea - used his spiriva and albuterol, not improving, called 911 - EMS gave atrovent and O2 without improvement - pt c/o difficulty breathing and needing to have BM. [facility]. In [facility] placed on bipap with relief. (12 Oct 2017 11:20)      PAST MEDICAL & SURGICAL HISTORY:  Stroke  CHF (congestive heart failure)  Stented coronary artery  MI, old  Asthma  Atrial fibrillation  Diabetes  Bronchitis  Hyperlipidemia  Pacemaker  COPD (chronic obstructive pulmonary disease)  Cardiac arrhythmia  H/O hernia repair  Cardiac pacemaker      FAMILY HISTORY:        Social History:    MEDICATIONS  (STANDING):  amiodarone    Tablet 200 milliGRAM(s) Oral daily  aspirin  chewable 81 milliGRAM(s) Oral daily  atorvastatin 80 milliGRAM(s) Oral at bedtime  buDESOnide 160 MICROgram(s)/formoterol 4.5 MICROgram(s) Inhaler 2 Puff(s) Inhalation two times a day  dextrose 5%. 1000 milliLiter(s) (50 mL/Hr) IV Continuous <Continuous>  dextrose 50% Injectable 12.5 Gram(s) IV Push once  dextrose 50% Injectable 25 Gram(s) IV Push once  dextrose 50% Injectable 25 Gram(s) IV Push once  digoxin     Tablet 0.125 milliGRAM(s) Oral daily  insulin lispro (HumaLOG) corrective regimen sliding scale   SubCutaneous three times a day before meals  lisinopril 5 milliGRAM(s) Oral daily  metoprolol succinate ER 75 milliGRAM(s) Oral every 12 hours  nicotine -  14 mG/24Hr(s) Patch 1 patch Transdermal daily  predniSONE   Tablet 20 milliGRAM(s) Oral daily  tiotropium 18 MICROgram(s) Capsule 1 Capsule(s) Inhalation daily  warfarin 2.5 milliGRAM(s) Oral once    MEDICATIONS  (PRN):  dextrose Gel 1 Dose(s) Oral once PRN Blood Glucose LESS THAN 70 milliGRAM(s)/deciliter  glucagon  Injectable 1 milliGRAM(s) IntraMuscular once PRN Glucose LESS THAN 70 milligrams/deciliter        T(C): 36.3 (10-14-17 @ 13:20), Max: 36.7 (10-14-17 @ 04:52)  HR: 72 (10-14-17 @ 13:20) (72 - 116)  BP: 116/73 (10-14-17 @ 13:20) (104/56 - 119/65)  RR: 18 (10-14-17 @ 13:20) (18 - 20)  SpO2: 93% (10-14-17 @ 13:20) (93% - 96%)  Wt(kg): --    PHYSICAL EXAM:  GENERAL: NAD, well-groomed, well-developed  HEAD:  Atraumatic, Normocephalic  NECK: Supple, No JVD, Normal thyroid  CHEST/LUNG: Clear to percussion bilaterally; No rales, rhonchi, wheezing, or rubs  HEART: Regular rate and rhythm; No murmurs, rubs, or gallops  ABDOMEN: Soft, Nontender, Nondistended; Bowel sounds present  EXTREMITIES:  2+ Peripheral Pulses, No clubbing, cyanosis, or edema  SKIN: No rashes or lesions    CAPILLARY BLOOD GLUCOSE      POCT Blood Glucose.: 192 mg/dL (14 Oct 2017 12:11)  POCT Blood Glucose.: 152 mg/dL (14 Oct 2017 08:06)  POCT Blood Glucose.: 177 mg/dL (13 Oct 2017 21:08)  POCT Blood Glucose.: 146 mg/dL (13 Oct 2017 16:26)                            15.6   17.7  )-----------( 158      ( 14 Oct 2017 06:17 )             47.4       CMP:  10-14 @ 06:17  SGPT 29  Albumin 3.5   Alk Phos 72   Anion Gap 9   SGOT 22   Total Bili 1.5   BUN 39   Calcium Total 8.8   CO2 26   Chloride 105   Creatinine 1.98   eGFR if AA 37   eGFR if non AA 32   Glucose 164   Potassium 4.6   Protein 7.0   Sodium 140      Thyroid Function Tests:      Diabetes Tests:       Radiology:
PULMONARY/CRITICAL CARE        BRIEF HOSPITAL COURSE: ***   78 y/o M with a h/o COPD, current smoker, carotid stenosis, prior CVA, CAD (s/p PCI), CHF, PPM, a-fib (on coumadin), HTN, HLD, DM presents to ED awakening from sleep in severe respiratory distress. Patient reports developing worsening SOB overnight that did not respond to inhaled bronchodilators, which prompted him to call 911. Denies all other symptoms besides dyspnea. Patient started on BiPAP therapy and diuresed with 80mg IV Lasix in ED with improvement in symptoms. Patient also noted to be hypertensive in a-fib with RVR- given 15mg IV cardizem- rate control achieved. Patient's wife at bedside, reports that patient has not been himself over the past several weeks, has been experiencing intermittent confusion. Patient now hypotensive (BP: 99/45).  No recent fever,chills, sweats cough. No CP. Improved on Bipap and with diuresis. HR better.    Events last 24 hours: ***    PAST MEDICAL & SURGICAL HISTORY:  Stroke  CHF (congestive heart failure)  Stented coronary artery  MI, old  Asthma  Atrial fibrillation  Diabetes  Bronchitis  Hyperlipidemia  Pacemaker  COPD (chronic obstructive pulmonary disease)--never intubated  Cardiac arrhythmia  H/O hernia repair  Cardiac pacemaker    Allergies    penicillin (Anaphylaxis)    Intolerances      FAMILY HISTORY: and SOCIAL HX-- in past. Smokes 1/2ppd--smoked 1 ppd for more than 40 yrs. No etoh. FH NC    Review of Systems:  CONSTITUTIONAL: No fever, chills, or fatigue  EYES: No eye pain, visual disturbances, or discharge  ENMT:  No difficulty hearing, tinnitus, vertigo; No sinus or throat pain  NECK: No pain or stiffness  RESPIRATORY: No cough, wheezing, chills or hemoptysis; Severe shortness of breath  CARDIOVASCULAR: No chest pain, palpitations, dizziness, or leg swelling  GASTROINTESTINAL: No abdominal or epigastric pain. No nausea, vomiting, or hematemesis; No diarrhea or constipation. No melena or hematochezia.  GENITOURINARY: No dysuria, frequency, hematuria, or incontinence  NEUROLOGICAL: No headaches, memory loss, loss of strength, numbness, or tremors  SKIN: No itching, burning, rashes, or lesions   MUSCULOSKELETAL: No joint pain or swelling; No muscle, back, or extremity pain  PSYCHIATRIC: No depression, anxiety, mood swings, or difficulty sleeping      Medications:    amiodarone    Tablet 100 milliGRAM(s) Oral two times a day  lisinopril 20 milliGRAM(s) Oral daily  metoprolol succinate  milliGRAM(s) Oral daily                atorvastatin 80 milliGRAM(s) Oral at bedtime                  ICU Vital Signs Last 24 Hrs  T(C): 36.5 (12 Oct 2017 03:59), Max: 36.5 (12 Oct 2017 03:59)  T(F): 97.7 (12 Oct 2017 03:59), Max: 97.7 (12 Oct 2017 03:59)  HR: 111 (12 Oct 2017 07:50) (85 - 158)  BP: 103/75 (12 Oct 2017 06:20) (96/48 - 161/104)  BP(mean): --  ABP: --  ABP(mean): --  RR: 17 (12 Oct 2017 06:20) (17 - 30)  SpO2: 98% (12 Oct 2017 07:50) (95% - 100%)    Vital Signs Last 24 Hrs  T(C): 36.5 (12 Oct 2017 03:59), Max: 36.5 (12 Oct 2017 03:59)  T(F): 97.7 (12 Oct 2017 03:59), Max: 97.7 (12 Oct 2017 03:59)  HR: 111 (12 Oct 2017 07:50) (85 - 158)  BP: 103/75 (12 Oct 2017 06:20) (96/48 - 161/104)  BP(mean): --  RR: 17 (12 Oct 2017 06:20) (17 - 30)  SpO2: 98% (12 Oct 2017 07:50) (95% - 100%)        I&O's Detail    11 Oct 2017 07:01  -  12 Oct 2017 07:00  --------------------------------------------------------  IN:  Total IN: 0 mL    OUT:    Voided: 500 mL  Total OUT: 500 mL    Total NET: -500 mL            LABS:                        17.7   14.3  )-----------( 187      ( 12 Oct 2017 04:33 )             52.8     10-12    141  |  105  |  23  ----------------------------<  197<H>  4.4   |  23  |  1.68<H>    Ca    9.1      12 Oct 2017 04:33    TPro  7.6  /  Alb  4.0  /  TBili  1.7<H>  /  DBili  x   /  AST  20  /  ALT  27  /  AlkPhos  94  10-12      CARDIAC MARKERS ( 12 Oct 2017 04:33 )  .003 ng/mL / x     / 51 U/L / x     / 1.3 ng/mL      CAPILLARY BLOOD GLUCOSE        PT/INR - ( 12 Oct 2017 04:33 )   PT: 37.4 sec;   INR: 3.35 ratio         PTT - ( 12 Oct 2017 04:33 )  PTT:59.2 sec  Urinalysis Basic - ( 12 Oct 2017 06:56 )    Color: Yellow / Appearance: Clear / S.015 / pH: x  Gluc: x / Ketone: Negative  / Bili: Negative / Urobili: Negative mg/dL   Blood: x / Protein: 30 mg/dL / Nitrite: Negative   Leuk Esterase: Negative / RBC: x / WBC x   Sq Epi: x / Non Sq Epi: x / Bacteria: x      CULTURES:      Physical Examination:    General: No acute distress.      HEENT: Pupils equal, reactive to light.  Symmetric.    PULM: Decreased BS. Few bas crackles. Good excursion    CVS: IRRegular rate and rhythm, no murmurs, rubs, or gallops    ABD: Soft, nondistended, nontender, normoactive bowel sounds, no masses    EXT: No edema, nontender    SKIN: Warm and well perfused, no rashes noted.    NEURO: Alert, oriented, interactive, nonfocal    RADIOLOGY: *** CXR  chf    EKG rapid af
Patient is a 77y old  Male who presents with a chief complaint of     BRIEF HOSPITAL COURSE: 76 y/o M with a h/o COPD, current smoker, carotid stenosis, prior CVA, CAD (s/p PCI), CHF, PPM, a-fib (on coumadin), HTN, HLD, DM presents to ED today in severe respiratory distress. Patient reports developing worsening SOB overnight that did not respond to inhaled bronchodilators, which prompted him to call 911. Denies all other symptoms besides dyspnea. Patient started on BiPAP therapy and diuresed with 80mg IV Lasix in ED with improvement in symptoms. Patient also noted to be hypertensive in a-fib with RVR- given 15mg IV cardizem- rate control achieved. Patient's wife at bedside, reports that patient has not been himself over the past several weeks, has been experiencing intermittent confusion. Patient now hypotensive (BP: 99/45).      PAST MEDICAL & SURGICAL HISTORY:  Stroke  CHF (congestive heart failure)  Stented coronary artery  MI, old  Asthma  Atrial fibrillation  Diabetes  Bronchitis  Hyperlipidemia  Pacemaker  COPD (chronic obstructive pulmonary disease)  Cardiac arrhythmia  H/O hernia repair  Cardiac pacemaker    Allergies    penicillin (Anaphylaxis)    Intolerances      FAMILY HISTORY:      Review of Systems:  CONSTITUTIONAL: No fever, chills, or fatigue  EYES: No eye pain, visual disturbances, or discharge  ENMT:  No difficulty hearing, tinnitus, vertigo; No sinus or throat pain  NECK: No pain or stiffness  RESPIRATORY: No cough, wheezing, chills or hemoptysis; (+)shortness of breath  CARDIOVASCULAR: No chest pain, palpitations, dizziness, or leg swelling  GASTROINTESTINAL: No abdominal or epigastric pain. No nausea, vomiting, or hematemesis; No diarrhea or constipation. No melena or hematochezia.  GENITOURINARY: No dysuria, frequency, hematuria, or incontinence  NEUROLOGICAL: No headaches, memory loss, loss of strength, numbness, or tremors  SKIN: No itching, burning, rashes, or lesions   MUSCULOSKELETAL: No joint pain or swelling; No muscle, back, or extremity pain  PSYCHIATRIC: No depression, anxiety, mood swings, or difficulty sleeping      Medications:        ICU Vital Signs Last 24 Hrs  T(C): 36.5 (12 Oct 2017 03:59), Max: 36.5 (12 Oct 2017 03:59)  T(F): 97.7 (12 Oct 2017 03:59), Max: 97.7 (12 Oct 2017 03:59)  HR: 85 (12 Oct 2017 05:15) (85 - 158)  BP: 96/48 (12 Oct 2017 05:15) (96/48 - 161/104)  BP(mean): --  ABP: --  ABP(mean): --  RR: 19 (12 Oct 2017 05:15) (19 - 30)  SpO2: 97% (12 Oct 2017 05:15) (95% - 100%)    Vital Signs Last 24 Hrs  T(C): 36.5 (12 Oct 2017 03:59), Max: 36.5 (12 Oct 2017 03:59)  T(F): 97.7 (12 Oct 2017 03:59), Max: 97.7 (12 Oct 2017 03:59)  HR: 85 (12 Oct 2017 05:15) (85 - 158)  BP: 96/48 (12 Oct 2017 05:15) (96/48 - 161/104)  BP(mean): --  RR: 19 (12 Oct 2017 05:15) (19 - 30)  SpO2: 97% (12 Oct 2017 05:15) (95% - 100%)        I&O's Detail        LABS:                        17.7   14.3  )-----------( 187      ( 12 Oct 2017 04:33 )             52.8     10-12    141  |  105  |  23  ----------------------------<  197<H>  4.4   |  23  |  1.68<H>    Ca    9.1      12 Oct 2017 04:33    TPro  7.6  /  Alb  4.0  /  TBili  1.7<H>  /  DBili  x   /  AST  20  /  ALT  27  /  AlkPhos  94  10-12      CARDIAC MARKERS ( 12 Oct 2017 04:33 )  .003 ng/mL / x     / 51 U/L / x     / 1.3 ng/mL      CAPILLARY BLOOD GLUCOSE        PT/INR - ( 12 Oct 2017 04:33 )   PT: 37.4 sec;   INR: 3.35 ratio         PTT - ( 12 Oct 2017 04:33 )  PTT:59.2 sec    CULTURES:      Physical Examination:    General: No acute distress.  Alert, oriented, interactive, nonfocal, hard of hearing    HEENT: Pupils equal, reactive to light.  Symmetric.    PULM: bibasilar rales, expiratory wheeze in b/l bases, no significant sputum production    CVS: intermittently tachycardic, irregularly irregular rhythm, no murmurs, rubs, or gallops    ABD: Soft, nondistended, nontender, normoactive bowel sounds, no masses    EXT: No edema, nontender    SKIN: Warm and well perfused, no rashes noted.    NEURO: A&Ox3, strength 5/5 all extremities, cranial nerves grossly intact, no focal deficits    RADIOLOGY: CXR not officially read, possible pulmonary edema    CRITICAL CARE TIME SPENT: 55 mins  Evaluating/treating patient, reviewing data/labs/imaging, discussing case with multidisciplinary team, discussing plan/goals of care with patient/family. Non-inclusive of procedure time.
REASON FOR CONSULT: SOB     CHIEF COMPLAINT:    HPI:76 y/o M with a h/o COPD, current smoker, carotid stenosis, prior CVA, CAD (s/p PCI), Old MI decreased EF , CHF, PPM, a-fib (on coumadin), HTN, HLD, DM presents to ED awakening from sleep in severe respiratory distress. Patient reports developing worsening SOB overnight that did not respond to inhaled bronchodilators, which prompted him to call 911. Denies all other symptoms besides dyspnea. Patient started on BiPAP therapy and diuresed with 80mg IV Lasix in ED with improvement in symptoms. Patient also noted to be hypertensive in a-fib with RVR- given 15mg IV cardizem- rate control achieved for short period . Patient is feeling much better with IV diuresis , Bipap support . no he is on nasal canula oxygen , Patient denies any chest pain , fever or worsening of cough , Patient currently tachycardia , increase with activity , Patient is not compliant to diet ( had salty meal yesterday morning , continue to smoke ,     As per his wife , patient had stress test , showing no problem ,done within one year . Patient is being followed by cardiologist in  [facility].         PAST MEDICAL & SURGICAL HISTORY:  Stroke  CHF (congestive heart failure)  Stented coronary artery??  MI, old  Asthma  Atrial fibrillation  Diabetes  Bronchitis  Hyperlipidemia  Pacemaker  COPD (chronic obstructive pulmonary disease)  Cardiac arrhythmia  H/O hernia repair  Cardiac pacemaker      Allergies    penicillin (Anaphylaxis)    Intolerances        SOCIAL HISTORY: active smoker   60PPD    FAMILY HISTORY: not contributory       MEDICATIONS:  MEDICATIONS  (STANDING):  amiodarone    Tablet 100 milliGRAM(s) Oral two times a day  atorvastatin 80 milliGRAM(s) Oral at bedtime  buDESOnide 160 MICROgram(s)/formoterol 4.5 MICROgram(s) Inhaler 2 Puff(s) Inhalation two times a day  lisinopril 20 milliGRAM(s) Oral daily  metoprolol succinate  milliGRAM(s) Oral daily  tiotropium 18 MICROgram(s) Capsule 1 Capsule(s) Inhalation daily    MEDICATIONS  (PRN):      REVIEW OF SYSTEMS:    as above , other wise   All other review of systems is negative unless indicated above    Vital Signs Last 24 Hrs  T(C): 36.5 (12 Oct 2017 03:59), Max: 36.5 (12 Oct 2017 03:59)  T(F): 97.7 (12 Oct 2017 03:59), Max: 97.7 (12 Oct 2017 03:59)  HR: 111 (12 Oct 2017 07:50) (85 - 158)  BP: 103/75 (12 Oct 2017 06:20) (96/48 - 161/104)  BP(mean): --  RR: 17 (12 Oct 2017 06:20) (17 - 30)  SpO2: 98% (12 Oct 2017 07:50) (95% - 100%)    I&O's Summary    11 Oct 2017 07:01  -  12 Oct 2017 07:00  --------------------------------------------------------  IN: 0 mL / OUT: 500 mL / NET: -500 mL        PHYSICAL EXAM:    Constitutional: NAD, awake and alert, well-developed  HEENT: PERR, EOMI,  No oral cyananosis.  Neck:  supple,  No JVD  Respiratory: Breath sounds  bilaterally mild decreased  with basal crackles  Cardiovascular: S1 and S2, regular rate and rhythm, no Murmurs, gallops or rubs  Gastrointestinal: Bowel Sounds present, soft, nontender.   Extremities: No peripheral edema. No clubbing or cyanosis.  Vascular: 2+ peripheral pulses  Neurological: A/O x 3, no focal deficits  Musculoskeletal: no calf tenderness.  Skin: No rashes.      LABS: All Labs Reviewed:                        17.7   14.3  )-----------( 187      ( 12 Oct 2017 04:33 )             52.8     12 Oct 2017 04:33    141    |  105    |  23     ----------------------------<  197    4.4     |  23     |  1.68     Ca    9.1        12 Oct 2017 04:33    TPro  7.6    /  Alb  4.0    /  TBili  1.7    /  DBili  x      /  AST  20     /  ALT  27     /  AlkPhos  94     12 Oct 2017 04:33    PT/INR - ( 12 Oct 2017 04:33 )   PT: 37.4 sec;   INR: 3.35 ratio         PTT - ( 12 Oct 2017 04:33 )  PTT:59.2 sec  CARDIAC MARKERS ( 12 Oct 2017 04:33 )  .003 ng/mL / x     / 51 U/L / x     / 1.3 ng/mL      Blood Culture:   10-12 @ 04:33  Pro Bnp [id number]        RADIOLOGY/EKG:  10/12/17   4:06 Am  afib with rapid ventricular rate 167     10/12/17  afib with rapid rate 113 , left axis normal QT non specific T changes    < from: Xray Chest 1 View AP-PORTABLE IMMEDIATE (10.12.17 @ 04:22) >  Comparison study dated 3/21/2015    Cardiac monitor leads present. Cardiac device overlies left axilla.   Cardiac loop recorder device overlies left lower thorax.    Prominent interstitial lung markings, reticulonodular densities,   unchanged since prior exam, consider underlying pattern of the fibrosis.   No vascular congestion effusion or lobar consolidation. Heart size upper   limits of normal, magnified secondary to portable technique. No acute   osseous abnormalities.    IMPRESSION:    See above report    < end of copied text >

## 2017-10-14 NOTE — CONSULT NOTE ADULT - CONSULT REASON
Acute Respiratory failure  CHF  COPD  Atrial Fib
Acute hypoxic respiratory failure
acute worsening of SOB
dm2 uncontrolled

## 2017-10-14 NOTE — CONSULT NOTE ADULT - PROBLEM SELECTOR RECOMMENDATION 9
metformin d/edouard (incr creatinine)  cont mod dose humalog scale coverage  to transition to prandin upon d/c  goal 100-180 in hosp setting; ada diet
acute on chronic CHF systolic , with COPD   clinically improving , continue bronchodilator treatment . Diuresis , advised the patient to quit smoking
Likely related to acute heart failure exacerbation. Recommend continuing BiPAP therapy for now, titrate to maintain SaO2 > 92%. May be a candidate to trial off mask relatively soon. Keep HOB elevated > 30 degrees.
Trial nasal O2  Inhalers  Diurese

## 2017-10-14 NOTE — CONSULT NOTE ADULT - PROBLEM SELECTOR PROBLEM 1
Diabetes mellitus type 2, uncontrolled
SOB (shortness of breath)
Acute respiratory failure with hypoxia
Acute respiratory failure with hypoxia

## 2017-10-15 ENCOUNTER — TRANSCRIPTION ENCOUNTER (OUTPATIENT)
Age: 77
End: 2017-10-15

## 2017-10-15 VITALS — WEIGHT: 178.13 LBS

## 2017-10-15 LAB
ALBUMIN SERPL ELPH-MCNC: 3.9 G/DL — SIGNIFICANT CHANGE UP (ref 3.3–5)
ALP SERPL-CCNC: 79 U/L — SIGNIFICANT CHANGE UP (ref 30–120)
ALT FLD-CCNC: 34 U/L DA — SIGNIFICANT CHANGE UP (ref 10–60)
ANION GAP SERPL CALC-SCNC: 11 MMOL/L — SIGNIFICANT CHANGE UP (ref 5–17)
AST SERPL-CCNC: 29 U/L — SIGNIFICANT CHANGE UP (ref 10–40)
BILIRUB SERPL-MCNC: 1.7 MG/DL — HIGH (ref 0.2–1.2)
BUN SERPL-MCNC: 42 MG/DL — HIGH (ref 7–23)
CALCIUM SERPL-MCNC: 9.2 MG/DL — SIGNIFICANT CHANGE UP (ref 8.4–10.5)
CHLORIDE SERPL-SCNC: 102 MMOL/L — SIGNIFICANT CHANGE UP (ref 96–108)
CO2 SERPL-SCNC: 24 MMOL/L — SIGNIFICANT CHANGE UP (ref 22–31)
CREAT SERPL-MCNC: 1.84 MG/DL — HIGH (ref 0.5–1.3)
GLUCOSE BLDC GLUCOMTR-MCNC: 137 MG/DL — HIGH (ref 70–99)
GLUCOSE SERPL-MCNC: 134 MG/DL — HIGH (ref 70–99)
HCT VFR BLD CALC: 55.2 % — HIGH (ref 39–50)
HGB BLD-MCNC: 17 G/DL — SIGNIFICANT CHANGE UP (ref 13–17)
INR BLD: 1.91 RATIO — HIGH (ref 0.88–1.16)
MCHC RBC-ENTMCNC: 29.9 PG — SIGNIFICANT CHANGE UP (ref 27–34)
MCHC RBC-ENTMCNC: 30.8 GM/DL — LOW (ref 32–36)
MCV RBC AUTO: 96.9 FL — SIGNIFICANT CHANGE UP (ref 80–100)
PLATELET # BLD AUTO: 184 K/UL — SIGNIFICANT CHANGE UP (ref 150–400)
POTASSIUM SERPL-MCNC: 4.5 MMOL/L — SIGNIFICANT CHANGE UP (ref 3.5–5.3)
POTASSIUM SERPL-SCNC: 4.5 MMOL/L — SIGNIFICANT CHANGE UP (ref 3.5–5.3)
PROT SERPL-MCNC: 7.8 G/DL — SIGNIFICANT CHANGE UP (ref 6–8.3)
PROTHROM AB SERPL-ACNC: 21.1 SEC — HIGH (ref 9.8–12.7)
RBC # BLD: 5.7 M/UL — SIGNIFICANT CHANGE UP (ref 4.2–5.8)
RBC # FLD: 14.2 % — SIGNIFICANT CHANGE UP (ref 10.3–14.5)
SODIUM SERPL-SCNC: 137 MMOL/L — SIGNIFICANT CHANGE UP (ref 135–145)
WBC # BLD: 13 K/UL — HIGH (ref 3.8–10.5)
WBC # FLD AUTO: 13 K/UL — HIGH (ref 3.8–10.5)

## 2017-10-15 PROCEDURE — 99232 SBSQ HOSP IP/OBS MODERATE 35: CPT

## 2017-10-15 PROCEDURE — 71010: CPT | Mod: 26

## 2017-10-15 RX ORDER — NICOTINE POLACRILEX 2 MG
1 GUM BUCCAL
Qty: 15 | Refills: 1 | OUTPATIENT
Start: 2017-10-15 | End: 2017-11-13

## 2017-10-15 RX ORDER — ATORVASTATIN CALCIUM 80 MG/1
1 TABLET, FILM COATED ORAL
Qty: 0 | Refills: 0 | COMMUNITY
Start: 2017-10-15

## 2017-10-15 RX ORDER — TIOTROPIUM BROMIDE 18 UG/1
0 CAPSULE ORAL; RESPIRATORY (INHALATION)
Qty: 0 | Refills: 0 | COMMUNITY

## 2017-10-15 RX ORDER — LISINOPRIL 2.5 MG/1
1 TABLET ORAL
Qty: 30 | Refills: 0 | OUTPATIENT
Start: 2017-10-15 | End: 2017-11-14

## 2017-10-15 RX ORDER — METOPROLOL TARTRATE 50 MG
3 TABLET ORAL
Qty: 180 | Refills: 0 | OUTPATIENT
Start: 2017-10-15 | End: 2017-11-14

## 2017-10-15 RX ORDER — DIGOXIN 250 MCG
1 TABLET ORAL
Qty: 30 | Refills: 0 | OUTPATIENT
Start: 2017-10-15 | End: 2017-11-14

## 2017-10-15 RX ORDER — LISINOPRIL 2.5 MG/1
1 TABLET ORAL
Qty: 0 | Refills: 0 | COMMUNITY
Start: 2017-10-15 | End: 2017-11-14

## 2017-10-15 RX ORDER — METOPROLOL TARTRATE 50 MG
0 TABLET ORAL
Qty: 0 | Refills: 0 | COMMUNITY

## 2017-10-15 RX ORDER — WARFARIN SODIUM 2.5 MG/1
0 TABLET ORAL
Qty: 0 | Refills: 0 | COMMUNITY

## 2017-10-15 RX ORDER — TIOTROPIUM BROMIDE 18 UG/1
1 CAPSULE ORAL; RESPIRATORY (INHALATION)
Qty: 0 | Refills: 0 | DISCHARGE
Start: 2017-10-15

## 2017-10-15 RX ORDER — BUDESONIDE AND FORMOTEROL FUMARATE DIHYDRATE 160; 4.5 UG/1; UG/1
2 AEROSOL RESPIRATORY (INHALATION)
Qty: 1 | Refills: 0 | OUTPATIENT
Start: 2017-10-15

## 2017-10-15 RX ORDER — AMIODARONE HYDROCHLORIDE 400 MG/1
1 TABLET ORAL
Qty: 30 | Refills: 0 | OUTPATIENT
Start: 2017-10-15 | End: 2017-11-14

## 2017-10-15 RX ORDER — REPAGLINIDE 1 MG/1
1 TABLET ORAL
Qty: 90 | Refills: 0 | OUTPATIENT
Start: 2017-10-15 | End: 2017-11-14

## 2017-10-15 RX ADMIN — Medication 1 PATCH: at 11:23

## 2017-10-15 RX ADMIN — AMIODARONE HYDROCHLORIDE 200 MILLIGRAM(S): 400 TABLET ORAL at 05:34

## 2017-10-15 RX ADMIN — Medication 81 MILLIGRAM(S): at 11:37

## 2017-10-15 RX ADMIN — Medication 75 MILLIGRAM(S): at 05:35

## 2017-10-15 RX ADMIN — BUDESONIDE AND FORMOTEROL FUMARATE DIHYDRATE 2 PUFF(S): 160; 4.5 AEROSOL RESPIRATORY (INHALATION) at 06:39

## 2017-10-15 RX ADMIN — Medication 0.12 MILLIGRAM(S): at 05:34

## 2017-10-15 RX ADMIN — TIOTROPIUM BROMIDE 1 CAPSULE(S): 18 CAPSULE ORAL; RESPIRATORY (INHALATION) at 06:39

## 2017-10-15 RX ADMIN — Medication 20 MILLIGRAM(S): at 05:34

## 2017-10-15 RX ADMIN — Medication 1 PATCH: at 11:37

## 2017-10-15 NOTE — PROGRESS NOTE ADULT - PROBLEM SELECTOR PLAN 2
Clinically improved; change Lasix from IV to enteral route tomorrow; check renal function in AM
Cardiology f/u
Cardiology f/u
Clinically improved with resolution of acute component of HF; possible tachycardia-induced cardiomyopathy; d/c lasix due to renal dysfunction.  Reassess LV function in several months and if no improvement then refer for ICD.
Clinically improved; possible tachycardia-induced cardiomyopathy; d/c lasix due to renal dysfunction.  Reassess LV function in several months and if no improvement then refer for ICD.
change to po lasix  check echo  [facility] - will get report  cardio eval noted  monitor on tele  cardiac meds being adjusted
change to po lasix  check echo  [facility] - will get report  cardio eval noted  monitor on tele  cardiac meds being adjusted
Cardiology f/u

## 2017-10-15 NOTE — PROGRESS NOTE ADULT - PROBLEM SELECTOR PROBLEM 3
CAD (coronary artery disease)
Atrial fibrillation with RVR
Atrial fibrillation with RVR
CAD (coronary artery disease)
CAD (coronary artery disease)
SOB (shortness of breath)
SOB (shortness of breath)
Atrial fibrillation with RVR

## 2017-10-15 NOTE — DISCHARGE NOTE ADULT - PATIENT PORTAL LINK FT
“You can access the [facility] Patient Portal, offered by [facility], by registering with the following website: [email or web address]”

## 2017-10-15 NOTE — PROGRESS NOTE ADULT - PROBLEM SELECTOR PROBLEM 5
COPD (chronic obstructive pulmonary disease)
COPD (chronic obstructive pulmonary disease)
Atrial fibrillation
Atrial fibrillation
COPD (chronic obstructive pulmonary disease)

## 2017-10-15 NOTE — DISCHARGE NOTE ADULT - HOSPITAL COURSE
77 y.o. M BIBEMS for difficulty breathing - pt with h/o COPD and Afib, never CHF per wife - pt unable to provide history due to respiratory distress - pt was asleep, awoke with dyspnea - used his spiriva and albuterol, not improving, called 911 - EMS gave atrovent and O2 without improvement - pt c/o difficulty breathing and needing to have BM. [facility]. In [place] placed on bipap with relief.    Patient was treated with iv steroids and iv lasix. Lasix d/c'd. Patient not fluid overloaded. Improved. He also had episodes of ANNAMARIA. Meds adjusted. Patient had BENIGNO. Improving. He will f/u with PCP this week.

## 2017-10-15 NOTE — PROGRESS NOTE ADULT - PROBLEM SELECTOR PROBLEM 2
Acute on chronic systolic congestive heart failure
Acute decompensated heart failure
Acute decompensated heart failure
Acute on chronic systolic congestive heart failure
Acute decompensated heart failure

## 2017-10-15 NOTE — DISCHARGE NOTE ADULT - MEDICATION SUMMARY - MEDICATIONS TO CHANGE
I will SWITCH the dose or number of times a day I take the medications listed below when I get home from the hospital:    amiodarone 100 mg oral tablet  -- 100 milligram(s) by mouth 2 times a day    lisinopril 20 mg oral tablet  -- 1 tab(s) by mouth once a day

## 2017-10-15 NOTE — DISCHARGE NOTE ADULT - CARE PROVIDERS DIRECT ADDRESSES
,DirectAddress_Unknown,[email or web address]

## 2017-10-15 NOTE — DISCHARGE NOTE ADULT - CARE PLAN
Principal Discharge DX:	Chronic atrial fibrillation  Goal:	.  Instructions for follow-up, activity and diet:	Continue current medications  Follow-up with primary doctor/cardiologist this week  Secondary Diagnosis:	Chronic obstructive pulmonary disease, unspecified COPD type  Instructions for follow-up, activity and diet:	Continue current medications.  Follow-up with primary doctor within 1 week.

## 2017-10-15 NOTE — PROGRESS NOTE ADULT - PROBLEM SELECTOR PLAN 5
Inhalers  Smoking cessation counseling  Nicotine patch  Taper steroids
Inhalers  Smoking cessation counseling  Nicotine patch
Inhalers  Smoking cessation counseling  Nicotine patch  Taper steroids
amiodarone increased  started on digoxin for better hr control  increase b-blocker dose  dose coumadin
amiodarone increased  started on digoxin for better hr control  increase b-blocker dose  dose coumadin

## 2017-10-15 NOTE — PROGRESS NOTE ADULT - PROBLEM SELECTOR PLAN 3
Reportedly normal nuclear stress test within past 12 months; continue aspirin, metoprolol, atorvastatin.
Control rate
Control rate
Reportedly normal nuclear stress test within past 12 months; continue aspirin, metoprolol, atorvastatin.
Reportedly normal nuclear stress test within past 12 months; continue aspirin, metoprolol, atorvastatin.
treat copd and chf and rate control the afib
treat copd and chf and rate control the afib
Control rate

## 2017-10-15 NOTE — PROGRESS NOTE ADULT - ASSESSMENT
78 y/o M with a h/o COPD, current smoker, carotid stenosis, prior CVA, CAD (s/p PCI), CHF, PPM, a-fib (on coumadin), HTN, HLD, DM with acute hypoxic respiratory failure requiring BiPAP, acute decompensated heart failure, a-fib with fast rate, hypotension.    Patient has shown improvement with BiPAP application and diuresis.  Now on nasal O2.  Severe cardiomyopathy, valvular heart disease.
* I discussed with patient's wife and Dr. SONDRA Kohler
* I discussed with patient's wife and Dr. SONDRA Kohler again on 10/15; anticipate d/c home today; advised patient to avoid strenuous exertion; I offered to see patient in my office.
76 y/o M with a h/o COPD, current smoker, carotid stenosis, prior CVA, CAD (s/p PCI), CHF, PPM, a-fib (on coumadin), HTN, HLD, DM with acute hypoxic respiratory failure requiring BiPAP, acute decompensated heart failure, a-fib with fast rate, hypotension.    Patient has shown improvement with BiPAP application and diuresis.  Now on nasal O2--ambulated off O2 with some dyspnea.  Severe cardiomyopathy, valvular heart disease.
77 male with copd/chf/rapid afib
77 male with copd/chf/rapid afib
* I discussed with patient's wife and Dr. De La O.
76 y/o M with a h/o COPD, current smoker, carotid stenosis, prior CVA, CAD (s/p PCI), CHF, PPM, a-fib (on coumadin), HTN, HLD, DM with acute hypoxic respiratory failure requiring BiPAP, acute decompensated heart failure, a-fib with fast rate, hypotension.    Patient has shown improvement with BiPAP application and diuresis.  Now on nasal O2--ambulated off O2 with some dyspnea, but otherwise stable.  Severe cardiomyopathy, valvular heart disease.

## 2017-10-15 NOTE — PROGRESS NOTE ADULT - PROBLEM SELECTOR PLAN 1
Ventricular rate is modestly better - still suboptimally controlled (rapid); increase metoprolol to 75 mg BID; add oral digoxin; continue amiodarone; warfarin titrated to INR 2-3.
[place]
Diurese  Check oximetry room air sleeping and ambulating.
Ventricular rate continues to improve; continue metoprolol, digoxin, amiodarone, and warfarin titrated to INR 2-3; repeat INR in outpatient setting in a few days.
Ventricular rate is improving; continue metoprolol, digoxin, amiodarone, and  warfarin titrated to INR 2-3.
stable  pulm noted  taper steroids  continue nebs and inhalers  o2 as needed
stable  pulm noted  taper steroids  continue nebs and inhalers  o2 as needed
Check oximetry room air sleeping as outpt.  See me 10 days

## 2017-10-15 NOTE — DISCHARGE NOTE ADULT - MEDICATION SUMMARY - MEDICATIONS TO STOP TAKING
I will STOP taking the medications listed below when I get home from the hospital:    metFORMIN 500 mg oral tablet  -- 1 tab(s) by mouth once a day    metoprolol succinate 100 mg oral tablet, extended release  -- 1 tab(s) by mouth once a day

## 2017-10-15 NOTE — PROGRESS NOTE ADULT - PROBLEM SELECTOR PROBLEM 1
Acute respiratory failure with hypoxia
Acute respiratory failure with hypoxia
Atrial fibrillation with RVR
Atrial fibrillation with RVR
COPD (chronic obstructive pulmonary disease)
COPD (chronic obstructive pulmonary disease)
Atrial fibrillation with RVR
Acute respiratory failure with hypoxia

## 2017-10-15 NOTE — PROGRESS NOTE ADULT - PROVIDER SPECIALTY LIST ADULT
Cardiology
Cardiology
Critical Care
Internal Medicine
Internal Medicine
Cardiology
Critical Care
Critical Care

## 2017-10-15 NOTE — PROGRESS NOTE ADULT - SUBJECTIVE AND OBJECTIVE BOX
PULMONARY/CRITICAL CARE      INTERVAL HPI/OVERNIGHT EVENTS:  Markedly improved. Denies sob. No cp. off bipap. Alert, slightly confused, conversant. NAD    77y MaleHPI:  77 y.o. M BIBEMS for difficulty breathing - pt with h/o COPD and Afib, never CHF per wife - pt unable to provide history due to respiratory distress - pt was asleep, awoke with dyspnea - used his spiriva and albuterol, not improving, called 911 - EMS gave atrovent and O2 without improvement - pt c/o difficulty breathing and needing to have BM. [facility] In Er placed on bipap with relief. (12 Oct 2017 11:20)        PAST MEDICAL & SURGICAL HISTORY:  Stroke  CHF (congestive heart failure)  Stented coronary artery  MI, old  Asthma  Atrial fibrillation  Diabetes  Bronchitis  Hyperlipidemia  Pacemaker  COPD (chronic obstructive pulmonary disease)  Cardiac arrhythmia  H/O hernia repair  Cardiac pacemaker        ICU Vital Signs Last 24 Hrs  T(C): 36.8 (13 Oct 2017 05:16), Max: 36.9 (12 Oct 2017 21:05)  T(F): 98.2 (13 Oct 2017 05:16), Max: 98.4 (12 Oct 2017 21:05)  HR: 116 (13 Oct 2017 05:16) (98 - 116)  BP: 119/70 (13 Oct 2017 05:16) (106/64 - 125/80)  BP(mean): --  ABP: --  ABP(mean): --  RR: 18 (13 Oct 2017 05:16) (16 - 18)  SpO2: 95% (13 Oct 2017 05:16) (95% - 100%)    Qtts:     I&O's Summary    12 Oct 2017 07:01  -  13 Oct 2017 07:00  --------------------------------------------------------  IN: 0 mL / OUT: 1000 mL / NET: -1000 mL            REVIEW OF SYSTEMS:    CONSTITUTIONAL: No fever, weight loss, or fatigue  EYES: No eye pain, visual disturbances, or discharge  ENMT:  No difficulty hearing, tinnitus, vertigo; No sinus or throat pain  NECK: No pain or stiffness  BREASTS: No pain, masses, or nipple discharge  RESPIRATORY: No cough, wheezing, chills or hemoptysis; No shortness of breath  CARDIOVASCULAR: No chest pain, palpitations, dizziness, or leg swelling  GASTROINTESTINAL: No abdominal or epigastric pain. No nausea, vomiting, or hematemesis; No diarrhea or constipation. No melena or hematochezia.  GENITOURINARY: No dysuria, frequency, hematuria, or incontinence  NEUROLOGICAL: No headaches, memory loss, loss of strength, numbness, or tremors  SKIN: No itching, burning, rashes, or lesions   LYMPH NODES: No enlarged glands  ENDOCRINE: No heat or cold intolerance; No hair loss  MUSCULOSKELETAL: No joint pain or swelling; No muscle, back, or extremity pain, no calf tenderness  PSYCHIATRIC: No depression, anxiety, mood swings, or difficulty sleeping  HEME/LYMPH: No easy bruising, or bleeding gums  ALLERGY AND IMMUNOLOGIC: No hives or eczema      PHYSICAL EXAM:    GENERAL: NAD, well-groomed, well-developed, NAD  HEAD:  Atraumatic, Normocephalic  EYES: EOMI, PERRLA, conjunctiva and sclera clear  ENMT: No tonsillar erythema, exudates, or enlargement; Moist mucous membranes, Good dentition, No lesions  NECK: Supple, No JVD, Normal thyroid  NERVOUS SYSTEM:  Alert, slightly confused, Good concentration; Motor Strength 5/5 B/L upper and lower extremities  CHEST/LUNG: Clear to percussion bilaterally; No rales, rhonchi, wheezing, or rubs  HEART: Regular rate and rhythm; No murmurs, rubs, or gallops  ABDOMEN: Soft, Nontender, Nondistended; Bowel sounds present  EXTREMITIES:  2+ Peripheral Pulses, No clubbing, cyanosis, or edema  LYMPH: No lymphadenopathy noted  SKIN: No rashes or lesions        LABS:                        16.6   10.4  )-----------( 153      ( 13 Oct 2017 06:58 )             52.8     10-12    141  |  105  |  23  ----------------------------<  197<H>  4.4   |  23  |  1.68<H>    Ca    9.1      12 Oct 2017 04:33    TPro  7.6  /  Alb  4.0  /  TBili  1.7<H>  /  DBili  x   /  AST  20  /  ALT  27  /  AlkPhos  94  10-12    PT/INR - ( 13 Oct 2017 06:58 )   PT: 31.9 sec;   INR: 2.86 ratio         PTT - ( 12 Oct 2017 04:33 )  PTT:59.2 sec  Urinalysis Basic - ( 12 Oct 2017 06:56 )    Color: Yellow / Appearance: Clear / S.015 / pH: x  Gluc: x / Ketone: Negative  / Bili: Negative / Urobili: Negative mg/dL   Blood: x / Protein: 30 mg/dL / Nitrite: Negative   Leuk Esterase: Negative / RBC: x / WBC x   Sq Epi: x / Non Sq Epi: x / Bacteria: x      ABG - ( 12 Oct 2017 08:49 )  pH: x     /  pCO2: 34    /  pO2: 187   / HCO3: 24    / Base Excess: -1.0  /  SaO2: 99                vanco through     RADIOLOGY & ADDITIONAL STUDIES:  CXR mild congestion  ECHO--Patient ID: [id number] Patient Name: YOGI LYNN   Birth Date:  Sex: M        EXAM: US TTE W DOPPLER COMPLETE            PROCEDURE DATE: 10/12/2017        INTERPRETATION: Ordering Physician: GERARDO HORVATH [id number]    Indication: Atrial fibrillation. Congestive heart failure.    Technician: Sky Wyman    Study Quality: Suboptimal    Height: 5 feet 10 inches  Weight: 167 pounds  Blood Pressure: 124/71    MEASUREMENTS  IVS: 0.8 cm  PWT: 0.8 cm  LA: 5.4 cm  Aortic Root: 3.0 cm  LVIDd: 6.0 cm  LVIDs: 5.4 cm    LVEF: Approximately 25%    FINDINGS  Left Ventricle: Mild left ventricular enlargement with severe, global  systolic dysfunction. LVEF estimated at 25%.  Right Ventricle: Normal right ventricular size and function. A pacing wire  is seen in the right heart.  Left Atrium: Severe left atrial enlargement.  Right Atrium: Normal right atrium. The IVC is dilated and collapses less  than 50% with inspiration, suggesting elevated right atrial pressure.  Mitral Valve: Tethered mitral valve. Mild to moderate mitral regurgitation.  Aortic Valve: Aortic valve not well visualized. Mild to moderate aortic  insufficiency.  Tricuspid Valve: Normal tricuspid valve. Minimal tricuspid regurgitation.  Pulmonary artery systolic pressure estimated at 38 mmHg, assuming a right  atrial pressure of 15 mmHg, consistent with mild pulmonary hypertension.  Pulmonic Valve: Pulmonic valve not visualized.  Pericardium/Pleura: No pericardial effusion.      CONCLUSIONS:  1. Mild left ventricular enlargement with severe, global systolic  dysfunction. LVEF estimated at 25%.  2. Severe left atrial enlargement.  3. Mild to moderate mitral regurgitation.  4. Mild to moderate aortic insufficiency.  5. Mild pulmonary hypertension.                    FRANCY DUDLEY M.D., ATTENDING CARDIOLOGIST  This document has been electronically signed. Oct 12 2017 3:52PM        CRITICAL CARE TIME SPENT:
PULMONARY/CRITICAL CARE      INTERVAL HPI/OVERNIGHT EVENTS:    77y MaleHPI:  Pt. improved. Mild jung. No cp . Alert and conversant.  77 y.o. M BIBEMS for difficulty breathing - pt with h/o COPD and Afib, never CHF per wife - pt unable to provide history due to respiratory distress - pt was asleep, awoke with dyspnea - used his spiriva and albuterol, not improving, called 911 - EMS gave atrovent and O2 without improvement - pt c/o difficulty breathing and needing to have BM. [facility] In Er placed on bipap with relief. (12 Oct 2017 11:20)        PAST MEDICAL & SURGICAL HISTORY:  Stroke  CHF (congestive heart failure)  Stented coronary artery  MI, old  Asthma  Atrial fibrillation  Diabetes  Bronchitis  Hyperlipidemia  Pacemaker  COPD (chronic obstructive pulmonary disease)  Cardiac arrhythmia  H/O hernia repair  Cardiac pacemaker        ICU Vital Signs Last 24 Hrs  T(C): 36.7 (14 Oct 2017 04:52), Max: 36.9 (13 Oct 2017 09:25)  T(F): 98 (14 Oct 2017 04:52), Max: 98.4 (13 Oct 2017 09:25)  HR: 83 (14 Oct 2017 04:52) (83 - 116)  BP: 114/61 (14 Oct 2017 04:52) (98/55 - 131/74)  BP(mean): --  ABP: --  ABP(mean): --  RR: 18 (14 Oct 2017 04:52) (18 - 20)  SpO2: 95% (14 Oct 2017 04:52) (94% - 96%)    Qtts:     I&O's Summary    13 Oct 2017 07:01  -  14 Oct 2017 07:00  --------------------------------------------------------  IN: 0 mL / OUT: 550 mL / NET: -550 mL            REVIEW OF SYSTEMS:    CONSTITUTIONAL: No fever, weight loss, or fatigue  EYES: No eye pain, visual disturbances, or discharge  ENMT:  No difficulty hearing, tinnitus, vertigo; No sinus or throat pain  NECK: No pain or stiffness  BREASTS: No pain, masses, or nipple discharge  RESPIRATORY: No cough, wheezing, chills or hemoptysis; No shortness of breath  CARDIOVASCULAR: No chest pain, palpitations, dizziness, or leg swelling  GASTROINTESTINAL: No abdominal or epigastric pain. No nausea, vomiting, or hematemesis; No diarrhea or constipation. No melena or hematochezia.  GENITOURINARY: No dysuria, frequency, hematuria, or incontinence  NEUROLOGICAL: No headaches, memory loss, loss of strength, numbness, or tremors  SKIN: No itching, burning, rashes, or lesions   LYMPH NODES: No enlarged glands  ENDOCRINE: No heat or cold intolerance; No hair loss  MUSCULOSKELETAL: No joint pain or swelling; No muscle, back, or extremity pain, no calf tenderness  PSYCHIATRIC: No depression, anxiety, mood swings, or difficulty sleeping  HEME/LYMPH: No easy bruising, or bleeding gums  ALLERGY AND IMMUNOLOGIC: No hives or eczema      PHYSICAL EXAM:    GENERAL: NAD, well-groomed, well-developed, NAD  HEAD:  Atraumatic, Normocephalic  EYES: EOMI, PERRLA, conjunctiva and sclera clear  ENMT: No tonsillar erythema, exudates, or enlargement; Moist mucous membranes, Good dentition, No lesions  NECK: Supple, No JVD, Normal thyroid  NERVOUS SYSTEM:  Alert & Oriented X3, Good concentration; Motor Strength 5/5 B/L upper and lower extremities  CHEST/LUNG: Clear to percussion bilaterally; No rales, rhonchi, wheezing, or rubs  HEART: Regular rate and rhythm; No murmurs, rubs, or gallops  ABDOMEN: Soft, Nontender, Nondistended; Bowel sounds present  EXTREMITIES:  2+ Peripheral Pulses, No clubbing, cyanosis, or edema  LYMPH: No lymphadenopathy noted  SKIN: No rashes or lesions        LABS:                        15.6   17.7  )-----------( 158      ( 14 Oct 2017 06:17 )             47.4     10-14    140  |  105  |  39<H>  ----------------------------<  164<H>  4.6   |  26  |  1.98<H>    Ca    8.8      14 Oct 2017 06:17  Mg     2.1     10-13    TPro  7.0  /  Alb  3.5  /  TBili  1.5<H>  /  DBili  x   /  AST  22  /  ALT  29  /  AlkPhos  72  10-14    PT/INR - ( 14 Oct 2017 06:17 )   PT: 25.1 sec;   INR: 2.26 ratio         PTT - ( 14 Oct 2017 06:17 )  PTT:38.2 sec      vanco through     RADIOLOGY & ADDITIONAL STUDIES:  CXR UNCHANGED.    CRITICAL CARE TIME SPENT:
Patient is a 77y old  Male who presents with a chief complaint of Resp distress (12 Oct 2017 11:20)      INTERVAL HPI/OVERNIGHT EVENTS: Patient seen and examined. NAD. No complaints. Feels well.      Vital Signs Last 24 Hrs  T(C): 36.7 (14 Oct 2017 04:52), Max: 36.9 (13 Oct 2017 09:25)  T(F): 98 (14 Oct 2017 04:52), Max: 98.4 (13 Oct 2017 09:25)  HR: 83 (14 Oct 2017 04:52) (83 - 116)  BP: 114/61 (14 Oct 2017 04:52) (98/55 - 131/74)  BP(mean): --  RR: 18 (14 Oct 2017 04:52) (18 - 20)  SpO2: 95% (14 Oct 2017 04:52) (94% - 96%)I&O's Summary    13 Oct 2017 07:01  -  14 Oct 2017 07:00  --------------------------------------------------------  IN: 0 mL / OUT: 550 mL / NET: -550 mL        LABS:                        15.6   17.7  )-----------( 158      ( 14 Oct 2017 06:17 )             47.4     10-14    140  |  105  |  39<H>  ----------------------------<  164<H>  4.6   |  26  |  1.98<H>    Ca    8.8      14 Oct 2017 06:17  Mg     2.1     10-13    TPro  7.0  /  Alb  3.5  /  TBili  1.5<H>  /  DBili  x   /  AST  22  /  ALT  29  /  AlkPhos  72  10-14    PT/INR - ( 14 Oct 2017 06:17 )   PT: 25.1 sec;   INR: 2.26 ratio         PTT - ( 14 Oct 2017 06:17 )  PTT:38.2 sec    CAPILLARY BLOOD GLUCOSE      POCT Blood Glucose.: 152 mg/dL (14 Oct 2017 08:06)  POCT Blood Glucose.: 177 mg/dL (13 Oct 2017 21:08)  POCT Blood Glucose.: 146 mg/dL (13 Oct 2017 16:26)  POCT Blood Glucose.: 365 mg/dL (13 Oct 2017 12:18)  POCT Blood Glucose.: 362 mg/dL (13 Oct 2017 12:16)    blood culture --  10-12 @ 11:52     urine culture --  10-12 @ 11:52  results   <10,000 CFU/ml Normal Urogenital orlando present        amiodarone    Tablet 200 milliGRAM(s) Oral two times a day  aspirin  chewable 81 milliGRAM(s) Oral daily  atorvastatin 80 milliGRAM(s) Oral at bedtime  buDESOnide 160 MICROgram(s)/formoterol 4.5 MICROgram(s) Inhaler 2 Puff(s) Inhalation two times a day  dextrose 5%. 1000 milliLiter(s) IV Continuous <Continuous>  dextrose 50% Injectable 12.5 Gram(s) IV Push once  dextrose 50% Injectable 25 Gram(s) IV Push once  dextrose 50% Injectable 25 Gram(s) IV Push once  dextrose Gel 1 Dose(s) Oral once PRN  digoxin     Tablet 0.125 milliGRAM(s) Oral daily  furosemide    Tablet 40 milliGRAM(s) Oral daily  glucagon  Injectable 1 milliGRAM(s) IntraMuscular once PRN  insulin lispro (HumaLOG) corrective regimen sliding scale   SubCutaneous three times a day before meals  lisinopril 5 milliGRAM(s) Oral daily  metoprolol succinate ER 75 milliGRAM(s) Oral every 12 hours  nicotine -  14 mG/24Hr(s) Patch 1 patch Transdermal daily  predniSONE   Tablet 20 milliGRAM(s) Oral daily  tiotropium 18 MICROgram(s) Capsule 1 Capsule(s) Inhalation daily      REVIEW OF SYSTEMS:  CONSTITUTIONAL: No fever, weight loss, or fatigue  NECK: No pain or stiffness  RESPIRATORY: No cough, wheezing, chills or hemoptysis; No shortness of breath  CARDIOVASCULAR: No chest pain, palpitations, dizziness, or leg swelling  GASTROINTESTINAL: No abdominal or epigastric pain. No nausea, vomiting, or hematemesis; No diarrhea or constipation. No melena or hematochezia.  GENITOURINARY: No dysuria, frequency, hematuria, or incontinence  NEUROLOGICAL: No headaches, loss of strength, numbness, or tremors  SKIN: No itching, burning  MUSCULOSKELETAL: No joint pain or swelling; No muscle, back, or extremity pain  PSYCHIATRIC: No depression, mood swings, HEME/LYMPH: No easy bruising, or bleeding gums  ALLERY AND IMMUNOLOGIC: No hives       Consultant(s) Notes Reviewed:  [ ] YES  [ ] NO    PHYSICAL EXAM:  GENERAL: NAD, well-groomed, well-developed  HEAD:  Atraumatic, Normocephalic  EYES: EOMI, PERRLA, conjunctiva and sclera clear  ENMT: No tonsillar erythema, exudates, or enlargement; Moist mucous membranes  NECK: Supple, No JVD  NERVOUS SYSTEM:  Awake & alert  CHEST/LUNG: Clear to auscultation bilaterally; No rales, rhonchi, wheezing,  HEART: Regular rate and rhythm  ABDOMEN: Soft, Nontender, Nondistended; Bowel sounds present  EXTREMITIES:  No clubbing, cyanosis, or edema  LYMPH: No lymphadenopathy noted  SKIN: No rashes      Advanced care planning discussed with patient/family [ ] YES   [ ] NO
Patient is a 77y old  Male who presents with a chief complaint of Resp distress (12 Oct 2017 11:20)      INTERVAL HPI/OVERNIGHT EVENTS: feels better today, less sob, cardio noted, still with elevated hr    MEDICATIONS  (STANDING):  amiodarone    Tablet 200 milliGRAM(s) Oral two times a day  aspirin  chewable 81 milliGRAM(s) Oral daily  atorvastatin 80 milliGRAM(s) Oral at bedtime  buDESOnide 160 MICROgram(s)/formoterol 4.5 MICROgram(s) Inhaler 2 Puff(s) Inhalation two times a day  dextrose 5%. 1000 milliLiter(s) (50 mL/Hr) IV Continuous <Continuous>  dextrose 50% Injectable 12.5 Gram(s) IV Push once  dextrose 50% Injectable 25 Gram(s) IV Push once  dextrose 50% Injectable 25 Gram(s) IV Push once  digoxin     Tablet 0.125 milliGRAM(s) Oral daily  insulin lispro (HumaLOG) corrective regimen sliding scale   SubCutaneous three times a day before meals  lisinopril 5 milliGRAM(s) Oral daily  metoprolol succinate ER 75 milliGRAM(s) Oral every 12 hours  metoprolol succinate ER 25 milliGRAM(s) Oral once  nicotine -  14 mG/24Hr(s) Patch 1 patch Transdermal daily  predniSONE   Tablet 20 milliGRAM(s) Oral daily  tiotropium 18 MICROgram(s) Capsule 1 Capsule(s) Inhalation daily    MEDICATIONS  (PRN):  dextrose Gel 1 Dose(s) Oral once PRN Blood Glucose LESS THAN 70 milliGRAM(s)/deciliter  glucagon  Injectable 1 milliGRAM(s) IntraMuscular once PRN Glucose LESS THAN 70 milligrams/deciliter      Allergies    penicillin (Anaphylaxis)    Intolerances        REVIEW OF SYSTEMS:  CONSTITUTIONAL: No fever, weight loss, or fatigue  EYES: No eye pain, visual disturbances  ENMT:  No difficulty hearing, tinnitus, vertigo; No sinus or throat pain  NECK: No pain or stiffness  RESPIRATORY: less sob  CARDIOVASCULAR: No chest pain, palpitations, dizziness  GASTROINTESTINAL: No abdominal or epigastric pain. No nausea, vomiting, or hematemesis; No diarrhea or constipation. No melena or hematochezia.  GENITOURINARY: No dysuria, frequency, hematuria, or incontinence  NEUROLOGICAL: No headaches, memory loss, loss of strength, numbness, or tremors  SKIN: No itching, burning  LYMPH NODES: No enlarged glands  MUSCULOSKELETAL: No joint pain or swelling; No muscle, back, or extremity pain  PSYCHIATRIC: No depression, mood swings  HEME/LYMPH: No easy bruising, or bleeding gums  ALLERGY AND IMMUNOLOGIC: No hives    Vital Signs Last 24 Hrs  T(C): 36.9 (13 Oct 2017 09:25), Max: 36.9 (12 Oct 2017 21:05)  T(F): 98.4 (13 Oct 2017 09:25), Max: 98.4 (12 Oct 2017 21:05)  HR: 110 (13 Oct 2017 09:25) (98 - 116)  BP: 131/74 (13 Oct 2017 09:25) (110/73 - 131/74)  BP(mean): --  RR: 20 (13 Oct 2017 09:25) (16 - 20)  SpO2: 94% (13 Oct 2017 09:25) (94% - 99%)    PHYSICAL EXAM:  GENERAL: NAD, well-groomed, well-developed  HEAD:  Atraumatic, Normocephalic  EYES: EOMI, PERRLA, conjunctiva and sclera clear  ENMT: No tonsillar erythema, exudates, or enlargement   NECK: Supple, No JVD  NERVOUS SYSTEM:  Alert & Oriented X3, Good concentration  CHEST/LUNG: Clear to auscultation bilaterally; No rales, rhonchi, wheezing  HEART: Regular rate and rhythm  ABDOMEN: Soft, Nontender, Nondistended; Bowel sounds present  EXTREMITIES:  2+ Peripheral Pulses   LYMPH: No lymphadenopathy noted  SKIN: No rashes     LABS:                        16.6   10.4  )-----------( 153      ( 13 Oct 2017 06:58 )             52.8     13 Oct 2017 06:58    139    |  104    |  29     ----------------------------<  183    4.3     |  25     |  1.73     Ca    8.8        13 Oct 2017 06:58  Mg     2.1       13 Oct 2017 06:58    TPro  7.1    /  Alb  3.5    /  TBili  2.3    /  DBili  x      /  AST  17     /  ALT  20     /  AlkPhos  79     13 Oct 2017 06:58    PT/INR - ( 13 Oct 2017 06:58 )   PT: 31.9 sec;   INR: 2.86 ratio         PTT - ( 12 Oct 2017 04:33 )  PTT:59.2 sec  Urinalysis Basic - ( 12 Oct 2017 06:56 )    Color: Yellow / Appearance: Clear / S.015 / pH: x  Gluc: x / Ketone: Negative  / Bili: Negative / Urobili: Negative mg/dL   Blood: x / Protein: 30 mg/dL / Nitrite: Negative   Leuk Esterase: Negative / RBC: x / WBC x   Sq Epi: x / Non Sq Epi: x / Bacteria: x      CAPILLARY BLOOD GLUCOSE  162 (12 Oct 2017 21:05)      POCT Blood Glucose.: 201 mg/dL (13 Oct 2017 07:55)  POCT Blood Glucose.: 162 mg/dL (12 Oct 2017 21:19)            RADIOLOGY & ADDITIONAL TESTS:  < from: Xray Chest 1 View AP -PORTABLE-Routine (10.13.17 @ 05:53) >  EXAM:  CHEST PORTABLE ROUTINE                                  PROCEDURE DATE:  10/13/2017          INTERPRETATION:  Short of breath.    AP chest. Prior 10/12/2017.    Left cardiac pacer reidentified in situ. A loop recorder projects over   the left lower thorax. Nonspecific bibasilar interstitial prominence   similar to prior. No focal consolidation or pleural effusion.    Impression: As above                  DEEPA DIALLO M.D., ATTENDING RADIOLOGIST  This document has been electronically signed. Oct 13 2017  9:08AM                < end of copied text >        Consultant(s) Notes Reviewed:  [ x] YES  [ ] NO    Care Discussed with Consultants/Other Providers [x] YES  [ ] NO    Advanced Care Planning Discussed with Patien/Family [x ] YES  [ ] NO
REASON FOR VISIT: CHF, AF    HPI: 76 y/o man with a history of COPD, current smoker, carotid stenosis, prior CVA, CAD (s/p PCI), Old MI, chronic systolic HF, PPM, chronic AF (on coumadin), HTN, HLD, DM admitted 10/12/17 with AF w/ RVR associated with decompensated HF and uncontrolled HTN.    10/13/17:  "I feel 100% better."  No new complaints; denies orthopnea, CP; improving SOB.  10/14/17:  "I feel 200% better."  Ambulating; no new complaints.    MEDICATIONS  (STANDING):  amiodarone    Tablet 200 milliGRAM(s) Oral two times a day  aspirin  chewable 81 milliGRAM(s) Oral daily  atorvastatin 80 milliGRAM(s) Oral at bedtime  buDESOnide 160 MICROgram(s)/formoterol 4.5 MICROgram(s) Inhaler 2 Puff(s) Inhalation two times a day  digoxin     Tablet 0.125 milliGRAM(s) Oral daily  furosemide    Tablet 40 milliGRAM(s) Oral daily  insulin lispro (HumaLOG) corrective regimen sliding scale   SubCutaneous three times a day before meals  lisinopril 5 milliGRAM(s) Oral daily  metoprolol succinate ER 75 milliGRAM(s) Oral every 12 hours  nicotine -  14 mG/24Hr(s) Patch 1 patch Transdermal daily  predniSONE   Tablet 20 milliGRAM(s) Oral daily  tiotropium 18 MICROgram(s) Capsule 1 Capsule(s) Inhalation daily    MEDICATIONS  (PRN):  dextrose Gel 1 Dose(s) Oral once PRN Blood Glucose LESS THAN 70 milliGRAM(s)/deciliter  glucagon  Injectable 1 milliGRAM(s) IntraMuscular once PRN Glucose LESS THAN 70 milligrams/deciliter    Vital Signs Last 24 Hrs  T(C): 36.4 (14 Oct 2017 09:19), Max: 36.9 (13 Oct 2017 13:34)  T(F): 97.5 (14 Oct 2017 09:19), Max: 98.4 (13 Oct 2017 13:34)  HR: 88 (14 Oct 2017 09:19) (83 - 116)  BP: 119/65 (14 Oct 2017 09:19) (98/55 - 119/65)  RR: 19 (14 Oct 2017 09:19) (18 - 20)  SpO2: 95% (14 Oct 2017 09:19) (94% - 96%)    PHYSICAL EXAM:  Constitutional: Supine/flat in bed, no distress  HEENT: No oral cyanosis.  Respiratory: Breath sounds mildly decreased at bases, no crackles, nonlabored  Cardiovascular: Irregularly irregular  Gastrointestinal: Bowel Sounds present, soft, nontender.   Extremities: No pedal edema.   Neurological: A/O x 3, no focal deficits  Skin: No rashes.    LABS:   CARDIAC MARKERS ( 13 Oct 2017 06:58 )  .011 ng/mL / x     / 55 U/L / x     / x      CARDIAC MARKERS ( 12 Oct 2017 20:47 )  .016 ng/mL / x     / 58 U/L / x     / x      CARDIAC MARKERS ( 12 Oct 2017 14:06 )  .031 ng/mL / x     / 45 U/L / x     / x                            15.6   17.7  )-----------( 158      ( 14 Oct 2017 06:17 )             47.4     140  |  105  |  39<H>  ----------------------------<  164<H>  4.6   |  26  |  1.98<H>    Tele: AF    Xray Chest 1 View AP-PORTABLE IMMEDIATE (10.12.17 @ 04:22):  Prominent interstitial lung markings, reticulonodular densities, unchanged since prior exam, consider underlying pattern of the fibrosis. No vascular congestion effusion or lobar consolidation. Heart size upper limits of normal, magnified secondary to portable technique. No acute osseous abnormalities.    US Transthoracic Echocardiogram w/Doppler Complete (10.12.17 @ 09:15):  1. Mild left ventricular enlargement with severe, global systolic dysfunction. LVEF estimated at 25%.  2. Severe left atrial enlargement.  3. Mild to moderate mitral regurgitation.  4. Mild to moderate aortic insufficiency.  5. Mild pulmonary hypertension.
REASON FOR VISIT: CHF, AF    HPI: 76 y/o man with a history of COPD, current smoker, carotid stenosis, prior CVA, CAD (s/p PCI), Old MI, chronic systolic HF, PPM, chronic AF (on coumadin), HTN, HLD, DM admitted 10/12/17 with AF w/ RVR associated with decompensated HF and uncontrolled HTN.    10/13/17:  "I feel 100% better."  No new complaints; denies orthopnea, CP; improving SOB.  10/14/17:  "I feel 200% better."  Ambulating; no new complaints.  10/15/17:  Wants to return home; feels well; no complaints; tolerating meds.    MEDICATIONS  (STANDING):  amiodarone    Tablet 200 milliGRAM(s) Oral daily  aspirin  chewable 81 milliGRAM(s) Oral daily  atorvastatin 80 milliGRAM(s) Oral at bedtime  buDESOnide 160 MICROgram(s)/formoterol 4.5 MICROgram(s) Inhaler 2 Puff(s) Inhalation two times a day  digoxin     Tablet 0.125 milliGRAM(s) Oral daily  insulin lispro (HumaLOG) corrective regimen sliding scale   SubCutaneous three times a day before meals  lisinopril 5 milliGRAM(s) Oral daily  metoprolol succinate ER 75 milliGRAM(s) Oral every 12 hours  nicotine -  14 mG/24Hr(s) Patch 1 patch Transdermal daily  predniSONE   Tablet 20 milliGRAM(s) Oral daily  tiotropium 18 MICROgram(s) Capsule 1 Capsule(s) Inhalation daily    MEDICATIONS  (PRN):  dextrose Gel 1 Dose(s) Oral once PRN Blood Glucose LESS THAN 70 milliGRAM(s)/deciliter  glucagon  Injectable 1 milliGRAM(s) IntraMuscular once PRN Glucose LESS THAN 70 milligrams/deciliter    Vital Signs Last 24 Hrs  T(C): 36.7 (15 Oct 2017 09:10), Max: 37 (14 Oct 2017 17:37)  T(F): 98.1 (15 Oct 2017 09:10), Max: 98.6 (14 Oct 2017 17:37)  HR: 69 (15 Oct 2017 09:10) (69 - 85)  BP: 132/86 (15 Oct 2017 09:10) (103/60 - 132/86)  RR: 17 (15 Oct 2017 09:10) (17 - 19)  SpO2: 98% (15 Oct 2017 09:10) (93% - 98%)    PHYSICAL EXAM:  Constitutional: Seated at edge of bed reading newspaper, no distress  HEENT: No oral cyanosis.  Respiratory: Breath sounds are clear, no crackles, nonlabored  Cardiovascular: Irregularly irregular, normal rate  Gastrointestinal: Bowel Sounds present, soft, nontender.   Extremities: No pedal edema.   Neurological: A/O x 3, no focal deficits  Skin: No rashes.    LABS:                        17.0   13.0  )-----------( 184      ( 15 Oct 2017 07:23 )             55.2     137  |  102  |  42<H>  ----------------------------<  134<H>  4.5   |  24  |  1.84<H>    Ca    9.2      15 Oct 2017 07:23    TPro  7.8  /  Alb  3.9  /  TBili  1.7<H>  /  DBili  x   /  AST  29  /  ALT  34  /  AlkPhos  79  10-15      Tele: AF    Xray Chest 1 View AP-PORTABLE IMMEDIATE (10.12.17 @ 04:22):  Prominent interstitial lung markings, reticulonodular densities, unchanged since prior exam, consider underlying pattern of the fibrosis. No vascular congestion effusion or lobar consolidation. Heart size upper limits of normal, magnified secondary to portable technique. No acute osseous abnormalities.    US Transthoracic Echocardiogram w/Doppler Complete (10.12.17 @ 09:15):  1. Mild left ventricular enlargement with severe, global systolic dysfunction. LVEF estimated at 25%.  2. Severe left atrial enlargement.  3. Mild to moderate mitral regurgitation.  4. Mild to moderate aortic insufficiency.  5. Mild pulmonary hypertension.
REASON FOR VISIT: CHF, AF    HPI: 76 y/o man with a history of COPD, current smoker, carotid stenosis, prior CVA, CAD (s/p PCI), Old MI, chronic systolic HF, PPM, chronic AF (on coumadin), HTN, HLD, DM admitted 10/12/17 with AF w/ RVR associated with decompensated HF and uncontrolled HTN.    10/13/17:  "I feel 100% better."  No new complaints; denies orthopnea, CP; improving SOB.    MEDICATIONS  (STANDING):  amiodarone    Tablet 200 milliGRAM(s) Oral two times a day  aspirin  chewable 81 milliGRAM(s) Oral daily  atorvastatin 80 milliGRAM(s) Oral at bedtime  buDESOnide 160 MICROgram(s)/formoterol 4.5 MICROgram(s) Inhaler 2 Puff(s) Inhalation two times a day  furosemide   Injectable 40 milliGRAM(s) IV Push daily  insulin lispro (HumaLOG) corrective regimen sliding scale   SubCutaneous three times a day before meals  lisinopril 5 milliGRAM(s) Oral daily  metoprolol succinate ER 50 milliGRAM(s) Oral every 12 hours  nicotine -  14 mG/24Hr(s) Patch 1 patch Transdermal daily  predniSONE   Tablet 20 milliGRAM(s) Oral daily  tiotropium 18 MICROgram(s) Capsule 1 Capsule(s) Inhalation daily    MEDICATIONS  (PRN):  dextrose Gel 1 Dose(s) Oral once PRN Blood Glucose LESS THAN 70 milliGRAM(s)/deciliter  glucagon  Injectable 1 milliGRAM(s) IntraMuscular once PRN Glucose LESS THAN 70 milligrams/deciliter    Vital Signs Last 24 Hrs  T(C): 36.9 (13 Oct 2017 09:25), Max: 36.9 (12 Oct 2017 21:05)  T(F): 98.4 (13 Oct 2017 09:25), Max: 98.4 (12 Oct 2017 21:05)  HR: 110 (13 Oct 2017 09:25) (98 - 116)  BP: 131/74 (13 Oct 2017 09:25) (106/64 - 131/74)  RR: 20 (13 Oct 2017 09:25) (16 - 20)  SpO2: 94% (13 Oct 2017 09:25) (94% - 99%)    PHYSICAL EXAM:  Constitutional: Supine/flat in bed, no distress  HEENT: No oral cyanosis.  Respiratory: Breath sounds mildly decreased at bases, no crackles, nonlabored  Cardiovascular: Irregular, tachycardic  Gastrointestinal: Bowel Sounds present, soft, nontender.   Extremities: No pedal edema.   Neurological: A/O x 3, no focal deficits  Skin: No rashes.    LABS:   CARDIAC MARKERS ( 13 Oct 2017 06:58 ) .011 ng/mL / x     / 55 U/L / x     / x      CARDIAC MARKERS ( 12 Oct 2017 20:47 ) .016 ng/mL / x     / 58 U/L / x     / x      CARDIAC MARKERS ( 12 Oct 2017 14:06 ) .031 ng/mL / x     / 45 U/L / x     / x      CARDIAC MARKERS ( 12 Oct 2017 04:33 ) .003 ng/mL / x     / 51 U/L / x     / 1.3 ng/mL                     16.6   10.4  )-----------( 153      ( 13 Oct 2017 06:58 )             52.8     139  |  104  |  29<H>  ----------------------------<  183<H>  4.3   |  25  |  1.73<H>    Tele: AF with RVR    Xray Chest 1 View AP-PORTABLE IMMEDIATE (10.12.17 @ 04:22):  Prominent interstitial lung markings, reticulonodular densities, unchanged since prior exam, consider underlying pattern of the fibrosis. No vascular congestion effusion or lobar consolidation. Heart size upper limits of normal, magnified secondary to portable technique. No acute osseous abnormalities.    US Transthoracic Echocardiogram w/Doppler Complete (10.12.17 @ 09:15):  1. Mild left ventricular enlargement with severe, global systolic dysfunction. LVEF estimated at 25%.  2. Severe left atrial enlargement.  3. Mild to moderate mitral regurgitation.  4. Mild to moderate aortic insufficiency.  5. Mild pulmonary hypertension.
PULMONARY/CRITICAL CARE      INTERVAL HPI/OVERNIGHT EVENTS: Doing well, ambulating. No desat. Denies SOB. No cp    77y MaleHPI:  77 y.o. M BIBEMS for difficulty breathing - pt with h/o COPD and Afib, never CHF per wife - pt unable to provide history due to respiratory distress - pt was asleep, awoke with dyspnea - used his spiriva and albuterol, not improving, called 911 - EMS gave atrovent and O2 without improvement - pt c/o difficulty breathing and needing to have BM. [facility] In Er placed on bipap with relief. (12 Oct 2017 11:20)        PAST MEDICAL & SURGICAL HISTORY:  Stroke  CHF (congestive heart failure)  Stented coronary artery  MI, old  Asthma  Atrial fibrillation  Diabetes  Bronchitis  Hyperlipidemia  Pacemaker  COPD (chronic obstructive pulmonary disease)  Cardiac arrhythmia  H/O hernia repair  Cardiac pacemaker        ICU Vital Signs Last 24 Hrs  T(C): 36.7 (15 Oct 2017 09:10), Max: 37 (14 Oct 2017 17:37)  T(F): 98.1 (15 Oct 2017 09:10), Max: 98.6 (14 Oct 2017 17:37)  HR: 69 (15 Oct 2017 09:10) (69 - 85)  BP: 132/86 (15 Oct 2017 09:10) (103/60 - 132/86)  BP(mean): --  ABP: --  ABP(mean): --  RR: 17 (15 Oct 2017 09:10) (17 - 19)  SpO2: 98% (15 Oct 2017 09:10) (93% - 98%)    Qtts:     I&O's Summary          REVIEW OF SYSTEMS:    CONSTITUTIONAL: No fever, weight loss, or fatigue  EYES: No eye pain, visual disturbances, or discharge  ENMT:  No difficulty hearing, tinnitus, vertigo; No sinus or throat pain  NECK: No pain or stiffness  BREASTS: No pain, masses, or nipple discharge  RESPIRATORY: No cough, wheezing, chills or hemoptysis; No shortness of breath  CARDIOVASCULAR: No chest pain, palpitations, dizziness, or leg swelling  GASTROINTESTINAL: No abdominal or epigastric pain. No nausea, vomiting, or hematemesis; No diarrhea or constipation. No melena or hematochezia.  GENITOURINARY: No dysuria, frequency, hematuria, or incontinence  NEUROLOGICAL: No headaches, memory loss, loss of strength, numbness, or tremors  SKIN: No itching, burning, rashes, or lesions   LYMPH NODES: No enlarged glands  ENDOCRINE: No heat or cold intolerance; No hair loss  MUSCULOSKELETAL: No joint pain or swelling; No muscle, back, or extremity pain, no calf tenderness  PSYCHIATRIC: No depression, anxiety, mood swings, or difficulty sleeping  HEME/LYMPH: No easy bruising, or bleeding gums  ALLERGY AND IMMUNOLOGIC: No hives or eczema      PHYSICAL EXAM:    GENERAL: NAD, well-groomed, well-developed, NAD  HEAD:  Atraumatic, Normocephalic  EYES: EOMI, PERRLA, conjunctiva and sclera clear  ENMT: No tonsillar erythema, exudates, or enlargement; Moist mucous membranes, Good dentition, No lesions  NECK: Supple, No JVD, Normal thyroid  NERVOUS SYSTEM:  Alert & Oriented X3, Good concentration; Motor Strength 5/5 B/L upper and lower extremities  CHEST/LUNG: Clear to percussion bilaterally; No rales, rhonchi, wheezing, or rubs  HEART: Regular rate and rhythm; No murmurs, rubs, or gallops  ABDOMEN: Soft, Nontender, Nondistended; Bowel sounds present  EXTREMITIES:  2+ Peripheral Pulses, No clubbing, cyanosis, or edema  LYMPH: No lymphadenopathy noted  SKIN: No rashes or lesions        LABS:                        17.0   13.0  )-----------( 184      ( 15 Oct 2017 07:23 )             55.2     10-15    137  |  102  |  42<H>  ----------------------------<  134<H>  4.5   |  24  |  1.84<H>    Ca    9.2      15 Oct 2017 07:23    TPro  7.8  /  Alb  3.9  /  TBili  1.7<H>  /  DBili  x   /  AST  29  /  ALT  34  /  AlkPhos  79  10-15    PT/INR - ( 15 Oct 2017 07:23 )   PT: 21.1 sec;   INR: 1.91 ratio         PTT - ( 14 Oct 2017 06:17 )  PTT:38.2 sec      vanco through     RADIOLOGY & ADDITIONAL STUDIES:  CXR ok    CRITICAL CARE TIME SPENT:

## 2017-10-15 NOTE — DISCHARGE NOTE ADULT - MEDICATION SUMMARY - MEDICATIONS TO TAKE
I will START or STAY ON the medications listed below when I get home from the hospital:    predniSONE 10 mg oral tablet  -- 2 tab(s) by mouth once a day x 2 days  1 tab(s) by mouth once a day x 2 days  -- It is very important that you take or use this exactly as directed.  Do not skip doses or discontinue unless directed by your doctor.  Obtain medical advice before taking any non-prescription drugs as some may affect the action of this medication.  Take with food or milk.    -- Indication: For COPD (chronic obstructive pulmonary disease)    aspirin 81 mg oral tablet  -- 1 tab(s) by mouth once a day  -- Indication: For Prophylactic measure    lisinopril 5 mg oral tablet  -- 1 tab(s) by mouth once a day  -- Indication: For HTN (hypertension)    amiodarone 200 mg oral tablet  -- 1 tab(s) by mouth once a day  -- Indication: For Atrial fibrillation    digoxin 125 mcg (0.125 mg) oral tablet  -- 1 tab(s) by mouth once a day  -- Indication: For Atrial fibrillation    Coumadin  -- M-F 5mg, Sa-Liao 2.5mg  -- Indication: For Atrial fibrillation    Prandin 0.5 mg oral tablet  -- 1 tab(s) by mouth 3 times a day (before meals)   -- Do not drink alcoholic beverages when taking this medication.  It is very important that you take or use this exactly as directed.  Do not skip doses or discontinue unless directed by your doctor.  Obtain medical advice before taking any non-prescription drugs as some may affect the action of this medication.    -- Indication: For Diabetes mellitus type 2, uncontrolled    atorvastatin 80 mg oral tablet  -- 1 tab(s) by mouth once a day (at bedtime)  -- Indication: For Hyperlipidemia    metoprolol succinate 25 mg oral tablet, extended release  -- 3 tab(s) by mouth every 12 hours  -- Indication: For HTN (hypertension)    budesonide-formoterol 160 mcg-4.5 mcg/inh inhalation aerosol  -- 2 puff(s) inhaled 2 times a day   -- Indication: For COPD (chronic obstructive pulmonary disease)    tiotropium 18 mcg inhalation capsule  -- 1 cap(s) inhaled once a day  -- Indication: For COPD (chronic obstructive pulmonary disease)    albuterol  -- Indication: For COPD (chronic obstructive pulmonary disease)    nicotine 14 mg/24 hr transdermal film, extended release  -- 1 patch by transdermal patch once a day   -- Indication: For Nicotine addiction

## 2017-10-15 NOTE — DISCHARGE NOTE ADULT - PLAN OF CARE
. Continue current medications  Follow-up with primary doctor/cardiologist this week Continue current medications.  Follow-up with primary doctor within 1 week.

## 2017-10-15 NOTE — DISCHARGE NOTE ADULT - CARE PROVIDER_API CALL
Juliana Carrion), Internal Medicine  [address] [facility]  [address]  [place] [address]  Phone: [phone]  Fax: [phone]    Wei Pinzon (MD), Cardiovascular Disease; Internal Medicine  [address] [place] [address]  Phone: [phone]  Fax: [phone]

## 2017-12-04 PROBLEM — J45.909 UNSPECIFIED ASTHMA, UNCOMPLICATED: Chronic | Status: ACTIVE | Noted: 2017-10-12

## 2017-12-04 PROBLEM — I48.91 UNSPECIFIED ATRIAL FIBRILLATION: Chronic | Status: ACTIVE | Noted: 2017-10-12

## 2017-12-04 PROBLEM — I25.2 OLD MYOCARDIAL INFARCTION: Chronic | Status: ACTIVE | Noted: 2017-10-12

## 2017-12-04 PROBLEM — Z95.5 PRESENCE OF CORONARY ANGIOPLASTY IMPLANT AND GRAFT: Chronic | Status: ACTIVE | Noted: 2017-10-12

## 2017-12-04 PROBLEM — I50.9 HEART FAILURE, UNSPECIFIED: Chronic | Status: ACTIVE | Noted: 2017-10-12

## 2017-12-04 PROBLEM — E11.9 TYPE 2 DIABETES MELLITUS WITHOUT COMPLICATIONS: Chronic | Status: ACTIVE | Noted: 2017-10-12

## 2017-12-06 ENCOUNTER — APPOINTMENT (OUTPATIENT)
Dept: CARDIOTHORACIC SURGERY | Facility: CLINIC | Age: 77
End: 2017-12-06
Payer: MEDICARE

## 2017-12-06 ENCOUNTER — OUTPATIENT (OUTPATIENT)
Dept: OUTPATIENT SERVICES | Facility: HOSPITAL | Age: 77
LOS: 1 days | End: 2017-12-06
Payer: MEDICARE

## 2017-12-06 VITALS
RESPIRATION RATE: 16 BRPM | HEART RATE: 100 BPM | WEIGHT: 187.83 LBS | DIASTOLIC BLOOD PRESSURE: 77 MMHG | TEMPERATURE: 97 F | SYSTOLIC BLOOD PRESSURE: 117 MMHG | HEIGHT: 67 IN

## 2017-12-06 VITALS
WEIGHT: 200 LBS | HEIGHT: 68 IN | HEART RATE: 102 BPM | OXYGEN SATURATION: 98 % | BODY MASS INDEX: 30.31 KG/M2 | DIASTOLIC BLOOD PRESSURE: 85 MMHG | SYSTOLIC BLOOD PRESSURE: 130 MMHG | RESPIRATION RATE: 16 BRPM

## 2017-12-06 DIAGNOSIS — Z01.818 ENCOUNTER FOR OTHER PREPROCEDURAL EXAMINATION: ICD-10-CM

## 2017-12-06 DIAGNOSIS — Z86.39 PERSONAL HISTORY OF OTHER ENDOCRINE, NUTRITIONAL AND METABOLIC DISEASE: ICD-10-CM

## 2017-12-06 DIAGNOSIS — F17.200 NICOTINE DEPENDENCE, UNSPECIFIED, UNCOMPLICATED: ICD-10-CM

## 2017-12-06 DIAGNOSIS — Z86.73 PERSONAL HISTORY OF TRANSIENT ISCHEMIC ATTACK (TIA), AND CEREBRAL INFARCTION W/OUT RESIDUAL DEFICITS: ICD-10-CM

## 2017-12-06 DIAGNOSIS — Z98.890 OTHER SPECIFIED POSTPROCEDURAL STATES: Chronic | ICD-10-CM

## 2017-12-06 DIAGNOSIS — I25.10 ATHEROSCLEROTIC HEART DISEASE OF NATIVE CORONARY ARTERY WITHOUT ANGINA PECTORIS: ICD-10-CM

## 2017-12-06 DIAGNOSIS — I25.10 ATHEROSCLEROTIC HEART DISEASE OF NATIVE CORONARY ARTERY W/OUT ANGINA PECTORIS: ICD-10-CM

## 2017-12-06 DIAGNOSIS — Z86.79 PERSONAL HISTORY OF OTHER DISEASES OF THE CIRCULATORY SYSTEM: ICD-10-CM

## 2017-12-06 DIAGNOSIS — I25.2 OLD MYOCARDIAL INFARCTION: ICD-10-CM

## 2017-12-06 DIAGNOSIS — Z87.09 PERSONAL HISTORY OF OTHER DISEASES OF THE RESPIRATORY SYSTEM: ICD-10-CM

## 2017-12-06 DIAGNOSIS — Z95.0 PRESENCE OF CARDIAC PACEMAKER: Chronic | ICD-10-CM

## 2017-12-06 LAB
ALBUMIN SERPL ELPH-MCNC: 4.2 G/DL — SIGNIFICANT CHANGE UP (ref 3.3–5.2)
ALP SERPL-CCNC: 93 U/L — SIGNIFICANT CHANGE UP (ref 40–120)
ALT FLD-CCNC: 25 U/L — SIGNIFICANT CHANGE UP
ANION GAP SERPL CALC-SCNC: 13 MMOL/L — SIGNIFICANT CHANGE UP (ref 5–17)
APPEARANCE UR: CLEAR — SIGNIFICANT CHANGE UP
APTT BLD: 48 SEC — HIGH (ref 27.5–37.4)
AST SERPL-CCNC: 24 U/L — SIGNIFICANT CHANGE UP
BACTERIA # UR AUTO: ABNORMAL
BASOPHILS # BLD AUTO: 0 K/UL — SIGNIFICANT CHANGE UP (ref 0–0.2)
BASOPHILS NFR BLD AUTO: 0.2 % — SIGNIFICANT CHANGE UP (ref 0–2)
BILIRUB SERPL-MCNC: 1.8 MG/DL — SIGNIFICANT CHANGE UP (ref 0.4–2)
BILIRUB UR-MCNC: NEGATIVE — SIGNIFICANT CHANGE UP
BLD GP AB SCN SERPL QL: SIGNIFICANT CHANGE UP
BUN SERPL-MCNC: 22 MG/DL — HIGH (ref 8–20)
CALCIUM SERPL-MCNC: 9.2 MG/DL — SIGNIFICANT CHANGE UP (ref 8.6–10.2)
CHLORIDE SERPL-SCNC: 104 MMOL/L — SIGNIFICANT CHANGE UP (ref 98–107)
CO2 SERPL-SCNC: 25 MMOL/L — SIGNIFICANT CHANGE UP (ref 22–29)
COLOR SPEC: YELLOW — SIGNIFICANT CHANGE UP
CREAT SERPL-MCNC: 1.5 MG/DL — HIGH (ref 0.5–1.3)
DIFF PNL FLD: ABNORMAL
EOSINOPHIL # BLD AUTO: 0.1 K/UL — SIGNIFICANT CHANGE UP (ref 0–0.5)
EOSINOPHIL NFR BLD AUTO: 2.2 % — SIGNIFICANT CHANGE UP (ref 0–5)
EPI CELLS # UR: SIGNIFICANT CHANGE UP
GLUCOSE SERPL-MCNC: 174 MG/DL — HIGH (ref 70–115)
GLUCOSE UR QL: NEGATIVE MG/DL — SIGNIFICANT CHANGE UP
HBA1C BLD-MCNC: 6.2 % — HIGH (ref 4–5.6)
HCT VFR BLD CALC: 45.3 % — SIGNIFICANT CHANGE UP (ref 42–52)
HGB BLD-MCNC: 15 G/DL — SIGNIFICANT CHANGE UP (ref 14–18)
INR BLD: 2.69 RATIO — HIGH (ref 0.88–1.16)
KETONES UR-MCNC: NEGATIVE — SIGNIFICANT CHANGE UP
LEUKOCYTE ESTERASE UR-ACNC: ABNORMAL
LYMPHOCYTES # BLD AUTO: 1.1 K/UL — SIGNIFICANT CHANGE UP (ref 1–4.8)
LYMPHOCYTES # BLD AUTO: 22.2 % — SIGNIFICANT CHANGE UP (ref 20–55)
MCHC RBC-ENTMCNC: 31.7 PG — HIGH (ref 27–31)
MCHC RBC-ENTMCNC: 33.1 G/DL — SIGNIFICANT CHANGE UP (ref 32–36)
MCV RBC AUTO: 95.8 FL — HIGH (ref 80–94)
MONOCYTES # BLD AUTO: 0.6 K/UL — SIGNIFICANT CHANGE UP (ref 0–0.8)
MONOCYTES NFR BLD AUTO: 12.2 % — HIGH (ref 3–10)
MRSA PCR RESULT.: SIGNIFICANT CHANGE UP
NEUTROPHILS # BLD AUTO: 3.2 K/UL — SIGNIFICANT CHANGE UP (ref 1.8–8)
NEUTROPHILS NFR BLD AUTO: 62.8 % — SIGNIFICANT CHANGE UP (ref 37–73)
NITRITE UR-MCNC: NEGATIVE — SIGNIFICANT CHANGE UP
NT-PROBNP SERPL-SCNC: 3897 PG/ML — HIGH (ref 0–300)
PH UR: 5 — SIGNIFICANT CHANGE UP (ref 5–8)
PLATELET # BLD AUTO: 132 K/UL — LOW (ref 150–400)
POTASSIUM SERPL-MCNC: 4.2 MMOL/L — SIGNIFICANT CHANGE UP (ref 3.5–5.3)
POTASSIUM SERPL-SCNC: 4.2 MMOL/L — SIGNIFICANT CHANGE UP (ref 3.5–5.3)
PREALB SERPL-MCNC: 24 MG/DL — SIGNIFICANT CHANGE UP (ref 18–38)
PROT SERPL-MCNC: 7.3 G/DL — SIGNIFICANT CHANGE UP (ref 6.6–8.7)
PROT UR-MCNC: 30 MG/DL
PROTHROM AB SERPL-ACNC: 30.2 SEC — HIGH (ref 9.8–12.7)
RBC # BLD: 4.73 M/UL — SIGNIFICANT CHANGE UP (ref 4.6–6.2)
RBC # FLD: 16.6 % — HIGH (ref 11–15.6)
RBC CASTS # UR COMP ASSIST: ABNORMAL /HPF (ref 0–4)
S AUREUS DNA NOSE QL NAA+PROBE: DETECTED
SODIUM SERPL-SCNC: 142 MMOL/L — SIGNIFICANT CHANGE UP (ref 135–145)
SP GR SPEC: 1.02 — SIGNIFICANT CHANGE UP (ref 1.01–1.02)
T3 SERPL-MCNC: 81 NG/DL — SIGNIFICANT CHANGE UP (ref 80–200)
T4 AB SER-ACNC: 7.8 UG/DL — SIGNIFICANT CHANGE UP (ref 4.5–12)
TSH SERPL-MCNC: 6.5 UIU/ML — HIGH (ref 0.27–4.2)
TYPE + AB SCN PNL BLD: SIGNIFICANT CHANGE UP
UROBILINOGEN FLD QL: 1 MG/DL
WBC # BLD: 5.1 K/UL — SIGNIFICANT CHANGE UP (ref 4.8–10.8)
WBC # FLD AUTO: 5.1 K/UL — SIGNIFICANT CHANGE UP (ref 4.8–10.8)
WBC UR QL: SIGNIFICANT CHANGE UP

## 2017-12-06 PROCEDURE — 71020: CPT | Mod: 26

## 2017-12-06 PROCEDURE — 99204 OFFICE O/P NEW MOD 45 MIN: CPT

## 2017-12-06 PROCEDURE — 93010 ELECTROCARDIOGRAM REPORT: CPT

## 2017-12-06 RX ORDER — METFORMIN HYDROCHLORIDE 500 MG/1
500 TABLET, FILM COATED ORAL
Refills: 0 | Status: ACTIVE | COMMUNITY

## 2017-12-06 RX ORDER — TIOTROPIUM BROMIDE 18 UG/1
18 CAPSULE ORAL; RESPIRATORY (INHALATION)
Refills: 0 | Status: ACTIVE | COMMUNITY

## 2017-12-06 RX ORDER — AMIODARONE HYDROCHLORIDE 200 MG/1
200 TABLET ORAL
Refills: 0 | Status: ACTIVE | COMMUNITY

## 2017-12-06 RX ORDER — WARFARIN SODIUM 6 MG/1
TABLET ORAL
Refills: 0 | Status: ACTIVE | COMMUNITY

## 2017-12-06 RX ORDER — ASPIRIN 81 MG
81 TABLET, DELAYED RELEASE (ENTERIC COATED) ORAL
Refills: 0 | Status: ACTIVE | COMMUNITY

## 2017-12-06 NOTE — PHYSICAL EXAM
[General Appearance - Alert] : alert [General Appearance - Well Nourished] : well nourished [General Appearance - Well-Appearing] : healthy appearing [Sclera] : the sclera and conjunctiva were normal [Outer Ear] : the ears and nose were normal in appearance [Neck Appearance] : the appearance of the neck was normal [] : no respiratory distress [Apical Impulse] : the apical impulse was normal [Heart Sounds] : normal S1 and S2 [Murmurs] : no murmurs [Examination Of The Chest] : the chest was normal in appearance [Abdomen Tenderness] : non-tender [Cervical Lymph Nodes Enlarged Posterior Bilaterally] : posterior cervical [No Focal Deficits] : no focal deficits [Oriented To Time, Place, And Person] : oriented to person, place, and time

## 2017-12-06 NOTE — ASSESSMENT
[FreeTextEntry1] : Very pleasant 77-year-old gentleman with a history of coronary artery disease, cardiomyopathy, COPD, and diabetes who now presents with progressive coronary artery disease which is not amenable to  PCI.  On the angiogram the patient has a moderate lesion in the proximal LAD, tight lesion in the ramus and moderate lesion in the left main, and a tight lesion in the PDA. By report his ejection fraction is around 25-30%. Given his symptoms and his clinical presentation I agree with the recommendation for coronary artery bypass grafting. I have discussed all risks and benefits with the patient and his wife. They agree to proceed with surgery. Because he is on Coumadin we will stop the Coumadin a few days before, and admitted the day before for heparin. Thank you for the opportunity to participate in the care of your patient.

## 2017-12-06 NOTE — REVIEW OF SYSTEMS
[Feeling Poorly] : feeling poorly [Feeling Tired] : feeling tired [SOB on Exertion] : shortness of breath during exertion [Negative] : Heme/Lymph

## 2017-12-06 NOTE — PATIENT PROFILE ADULT. - ABILITY TO HEAR (WITH HEARING AID OR HEARING APPLIANCE IF NORMALLY USED):
hearing aid/Mildly to Moderately Impaired: difficulty hearing in some environments or speaker may need to increase volume or speak distinctly

## 2017-12-06 NOTE — H&P PST ADULT - PMH
Asthma    Atrial fibrillation    Bronchitis    Cardiac arrhythmia    CHF (congestive heart failure)    COPD (chronic obstructive pulmonary disease)    Diabetes    Hyperlipidemia    Hypertension    MI, old    Pacemaker    Stented coronary artery    Stroke

## 2017-12-06 NOTE — H&P PST ADULT - HISTORY OF PRESENT ILLNESS
78 yo male with coronary artery disease [facility]. Pt reports SOB x 6 months.

## 2017-12-06 NOTE — CONSULT LETTER
[Dear  ___] : Dear  [unfilled], [Courtesy Letter:] : I had the pleasure of seeing your patient, [unfilled], in my office today. [Please see my note below.] : Please see my note below. [Consult Closing:] : Thank you very much for allowing me to participate in the care of this patient.  If you have any questions, please do not hesitate to contact me. [Sincerely,] : Sincerely, [FreeTextEntry2] : Dr.Paul Gloria\par [address] [place] [address]\[place] [address] [Americo Denny MD] : Americo Denny MD [Chief] : Chief [[facility]] : [facility]

## 2017-12-07 LAB
CULTURE RESULTS: NO GROWTH — SIGNIFICANT CHANGE UP
SPECIMEN SOURCE: SIGNIFICANT CHANGE UP

## 2017-12-08 ENCOUNTER — INPATIENT (INPATIENT)
Facility: HOSPITAL | Age: 77
LOS: 11 days | Discharge: HOME | DRG: 219 | End: 2017-12-20
Attending: THORACIC SURGERY (CARDIOTHORACIC VASCULAR SURGERY) | Admitting: THORACIC SURGERY (CARDIOTHORACIC VASCULAR SURGERY)
Payer: MEDICARE

## 2017-12-08 ENCOUNTER — EMERGENCY (EMERGENCY)
Facility: HOSPITAL | Age: 77
LOS: 1 days | Discharge: ANOTHER HOSPITAL | End: 2017-12-08
Attending: EMERGENCY MEDICINE | Admitting: EMERGENCY MEDICINE
Payer: MEDICARE

## 2017-12-08 VITALS
HEART RATE: 106 BPM | RESPIRATION RATE: 18 BRPM | SYSTOLIC BLOOD PRESSURE: 110 MMHG | OXYGEN SATURATION: 99 % | DIASTOLIC BLOOD PRESSURE: 48 MMHG

## 2017-12-08 VITALS
WEIGHT: 184.97 LBS | OXYGEN SATURATION: 100 % | HEIGHT: 70 IN | DIASTOLIC BLOOD PRESSURE: 85 MMHG | HEART RATE: 124 BPM | RESPIRATION RATE: 23 BRPM | TEMPERATURE: 98 F | SYSTOLIC BLOOD PRESSURE: 136 MMHG

## 2017-12-08 VITALS
OXYGEN SATURATION: 100 % | RESPIRATION RATE: 30 BRPM | TEMPERATURE: 98 F | HEART RATE: 122 BPM | HEIGHT: 66 IN | DIASTOLIC BLOOD PRESSURE: 106 MMHG | SYSTOLIC BLOOD PRESSURE: 156 MMHG | WEIGHT: 186.07 LBS

## 2017-12-08 DIAGNOSIS — Z98.890 OTHER SPECIFIED POSTPROCEDURAL STATES: Chronic | ICD-10-CM

## 2017-12-08 DIAGNOSIS — Z95.0 PRESENCE OF CARDIAC PACEMAKER: Chronic | ICD-10-CM

## 2017-12-08 DIAGNOSIS — I34.0 NONRHEUMATIC MITRAL (VALVE) INSUFFICIENCY: ICD-10-CM

## 2017-12-08 DIAGNOSIS — I25.10 ATHEROSCLEROTIC HEART DISEASE OF NATIVE CORONARY ARTERY WITHOUT ANGINA PECTORIS: ICD-10-CM

## 2017-12-08 DIAGNOSIS — I48.2 CHRONIC ATRIAL FIBRILLATION: ICD-10-CM

## 2017-12-08 DIAGNOSIS — I50.43 ACUTE ON CHRONIC COMBINED SYSTOLIC (CONGESTIVE) AND DIASTOLIC (CONGESTIVE) HEART FAILURE: ICD-10-CM

## 2017-12-08 LAB
ALBUMIN SERPL ELPH-MCNC: 3.9 G/DL — SIGNIFICANT CHANGE UP (ref 3.3–5)
ALBUMIN SERPL ELPH-MCNC: 3.9 G/DL — SIGNIFICANT CHANGE UP (ref 3.3–5.2)
ALP SERPL-CCNC: 90 U/L — SIGNIFICANT CHANGE UP (ref 40–120)
ALP SERPL-CCNC: 99 U/L — SIGNIFICANT CHANGE UP (ref 30–120)
ALT FLD-CCNC: 20 U/L — SIGNIFICANT CHANGE UP
ALT FLD-CCNC: 34 U/L DA — SIGNIFICANT CHANGE UP (ref 10–60)
ANION GAP SERPL CALC-SCNC: 12 MMOL/L — SIGNIFICANT CHANGE UP (ref 5–17)
ANION GAP SERPL CALC-SCNC: 15 MMOL/L — SIGNIFICANT CHANGE UP (ref 5–17)
APPEARANCE UR: CLEAR — SIGNIFICANT CHANGE UP
APTT BLD: 52.3 SEC — HIGH (ref 27.5–37.4)
APTT BLD: 68.6 SEC — HIGH (ref 27.5–37.4)
AST SERPL-CCNC: 19 U/L — SIGNIFICANT CHANGE UP
AST SERPL-CCNC: 21 U/L — SIGNIFICANT CHANGE UP (ref 10–40)
BASOPHILS # BLD AUTO: 0.1 K/UL — SIGNIFICANT CHANGE UP (ref 0–0.2)
BASOPHILS NFR BLD AUTO: 1 % — SIGNIFICANT CHANGE UP (ref 0–2)
BILIRUB DIRECT SERPL-MCNC: 0.2 MG/DL — SIGNIFICANT CHANGE UP (ref 0–0.3)
BILIRUB INDIRECT FLD-MCNC: 2 MG/DL — HIGH (ref 0.2–1)
BILIRUB SERPL-MCNC: 2 MG/DL — HIGH (ref 0.2–1.2)
BILIRUB SERPL-MCNC: 2.2 MG/DL — HIGH (ref 0.4–2)
BILIRUB UR-MCNC: NEGATIVE — SIGNIFICANT CHANGE UP
BLD GP AB SCN SERPL QL: SIGNIFICANT CHANGE UP
BUN SERPL-MCNC: 26 MG/DL — HIGH (ref 7–23)
BUN SERPL-MCNC: 27 MG/DL — HIGH (ref 8–20)
CALCIUM SERPL-MCNC: 8.7 MG/DL — SIGNIFICANT CHANGE UP (ref 8.6–10.2)
CALCIUM SERPL-MCNC: 8.8 MG/DL — SIGNIFICANT CHANGE UP (ref 8.4–10.5)
CHLORIDE SERPL-SCNC: 104 MMOL/L — SIGNIFICANT CHANGE UP (ref 98–107)
CHLORIDE SERPL-SCNC: 107 MMOL/L — SIGNIFICANT CHANGE UP (ref 96–108)
CK MB BLD-MCNC: 1.6 % — SIGNIFICANT CHANGE UP (ref 0–3.5)
CK MB CFR SERPL CALC: 0.7 NG/ML — SIGNIFICANT CHANGE UP (ref 0–3.6)
CK SERPL-CCNC: 37 U/L — SIGNIFICANT CHANGE UP (ref 30–200)
CK SERPL-CCNC: 45 U/L — SIGNIFICANT CHANGE UP (ref 39–308)
CO2 SERPL-SCNC: 23 MMOL/L — SIGNIFICANT CHANGE UP (ref 22–31)
CO2 SERPL-SCNC: 24 MMOL/L — SIGNIFICANT CHANGE UP (ref 22–29)
COLOR SPEC: YELLOW — SIGNIFICANT CHANGE UP
CREAT SERPL-MCNC: 1.41 MG/DL — HIGH (ref 0.5–1.3)
CREAT SERPL-MCNC: 1.6 MG/DL — HIGH (ref 0.5–1.3)
DIFF PNL FLD: ABNORMAL
EOSINOPHIL # BLD AUTO: 0.2 K/UL — SIGNIFICANT CHANGE UP (ref 0–0.5)
EOSINOPHIL NFR BLD AUTO: 2.3 % — SIGNIFICANT CHANGE UP (ref 0–6)
GLUCOSE BLDC GLUCOMTR-MCNC: 105 MG/DL — HIGH (ref 70–99)
GLUCOSE BLDC GLUCOMTR-MCNC: 140 MG/DL — HIGH (ref 70–99)
GLUCOSE BLDC GLUCOMTR-MCNC: 152 MG/DL — HIGH (ref 70–99)
GLUCOSE SERPL-MCNC: 162 MG/DL — HIGH (ref 70–115)
GLUCOSE SERPL-MCNC: 166 MG/DL — HIGH (ref 70–99)
GLUCOSE UR QL: NEGATIVE MG/DL — SIGNIFICANT CHANGE UP
HCT VFR BLD CALC: 44 % — SIGNIFICANT CHANGE UP (ref 42–52)
HCT VFR BLD CALC: 48.1 % — SIGNIFICANT CHANGE UP (ref 39–50)
HGB BLD-MCNC: 15 G/DL — SIGNIFICANT CHANGE UP (ref 14–18)
HGB BLD-MCNC: 16.8 G/DL — SIGNIFICANT CHANGE UP (ref 13–17)
INR BLD: 3.97 RATIO — HIGH (ref 0.88–1.16)
INR BLD: 3.99 RATIO — HIGH (ref 0.88–1.16)
KETONES UR-MCNC: NEGATIVE — SIGNIFICANT CHANGE UP
LEUKOCYTE ESTERASE UR-ACNC: NEGATIVE — SIGNIFICANT CHANGE UP
LYMPHOCYTES # BLD AUTO: 1.6 K/UL — SIGNIFICANT CHANGE UP (ref 1–3.3)
LYMPHOCYTES # BLD AUTO: 17.2 % — SIGNIFICANT CHANGE UP (ref 13–44)
MAGNESIUM SERPL-MCNC: 1.8 MG/DL — SIGNIFICANT CHANGE UP (ref 1.8–2.6)
MCHC RBC-ENTMCNC: 32.2 PG — HIGH (ref 27–31)
MCHC RBC-ENTMCNC: 32.4 PG — SIGNIFICANT CHANGE UP (ref 27–34)
MCHC RBC-ENTMCNC: 34.1 G/DL — SIGNIFICANT CHANGE UP (ref 32–36)
MCHC RBC-ENTMCNC: 34.9 GM/DL — SIGNIFICANT CHANGE UP (ref 32–36)
MCV RBC AUTO: 92.9 FL — SIGNIFICANT CHANGE UP (ref 80–100)
MCV RBC AUTO: 94.4 FL — HIGH (ref 80–94)
MONOCYTES # BLD AUTO: 0.9 K/UL — SIGNIFICANT CHANGE UP (ref 0–0.9)
MONOCYTES NFR BLD AUTO: 10.1 % — SIGNIFICANT CHANGE UP (ref 2–14)
NEUTROPHILS # BLD AUTO: 6.5 K/UL — SIGNIFICANT CHANGE UP (ref 1.8–7.4)
NEUTROPHILS NFR BLD AUTO: 69.4 % — SIGNIFICANT CHANGE UP (ref 43–77)
NITRITE UR-MCNC: NEGATIVE — SIGNIFICANT CHANGE UP
NT-PROBNP SERPL-SCNC: 4430 PG/ML — HIGH (ref 0–450)
NT-PROBNP SERPL-SCNC: 5334 PG/ML — HIGH (ref 0–300)
PH UR: 5 — SIGNIFICANT CHANGE UP (ref 5–8)
PHOSPHATE SERPL-MCNC: 2.9 MG/DL — SIGNIFICANT CHANGE UP (ref 2.4–4.7)
PLATELET # BLD AUTO: 127 K/UL — LOW (ref 150–400)
PLATELET # BLD AUTO: 146 K/UL — LOW (ref 150–400)
POTASSIUM SERPL-MCNC: 4.1 MMOL/L — SIGNIFICANT CHANGE UP (ref 3.5–5.3)
POTASSIUM SERPL-MCNC: 4.3 MMOL/L — SIGNIFICANT CHANGE UP (ref 3.5–5.3)
POTASSIUM SERPL-SCNC: 4.1 MMOL/L — SIGNIFICANT CHANGE UP (ref 3.5–5.3)
POTASSIUM SERPL-SCNC: 4.3 MMOL/L — SIGNIFICANT CHANGE UP (ref 3.5–5.3)
PREALB SERPL-MCNC: 21 MG/DL — SIGNIFICANT CHANGE UP (ref 18–38)
PROT SERPL-MCNC: 7 G/DL — SIGNIFICANT CHANGE UP (ref 6.6–8.7)
PROT SERPL-MCNC: 7.9 G/DL — SIGNIFICANT CHANGE UP (ref 6–8.3)
PROT UR-MCNC: 15 MG/DL
PROTHROM AB SERPL-ACNC: 44.5 SEC — HIGH (ref 9.8–12.7)
PROTHROM AB SERPL-ACNC: 45.1 SEC — HIGH (ref 9.8–12.7)
RBC # BLD: 4.66 M/UL — SIGNIFICANT CHANGE UP (ref 4.6–6.2)
RBC # BLD: 5.18 M/UL — SIGNIFICANT CHANGE UP (ref 4.2–5.8)
RBC # FLD: 15.2 % — HIGH (ref 10.3–14.5)
RBC # FLD: 16.7 % — HIGH (ref 11–15.6)
SODIUM SERPL-SCNC: 142 MMOL/L — SIGNIFICANT CHANGE UP (ref 135–145)
SODIUM SERPL-SCNC: 143 MMOL/L — SIGNIFICANT CHANGE UP (ref 135–145)
SP GR SPEC: 1.01 — SIGNIFICANT CHANGE UP (ref 1.01–1.02)
T4 AB SER-ACNC: 7.1 UG/DL — SIGNIFICANT CHANGE UP (ref 4.5–12)
TROPONIN I SERPL-MCNC: 0 NG/ML — LOW (ref 0.02–0.06)
TROPONIN T SERPL-MCNC: <0.01 NG/ML — SIGNIFICANT CHANGE UP (ref 0–0.06)
TSH SERPL-MCNC: 5.82 UIU/ML — HIGH (ref 0.27–4.2)
TYPE + AB SCN PNL BLD: SIGNIFICANT CHANGE UP
UROBILINOGEN FLD QL: NEGATIVE MG/DL — SIGNIFICANT CHANGE UP
WBC # BLD: 5.8 K/UL — SIGNIFICANT CHANGE UP (ref 4.8–10.8)
WBC # BLD: 9.4 K/UL — SIGNIFICANT CHANGE UP (ref 3.8–10.5)
WBC # FLD AUTO: 5.8 K/UL — SIGNIFICANT CHANGE UP (ref 4.8–10.8)
WBC # FLD AUTO: 9.4 K/UL — SIGNIFICANT CHANGE UP (ref 3.8–10.5)

## 2017-12-08 PROCEDURE — 87086 URINE CULTURE/COLONY COUNT: CPT

## 2017-12-08 PROCEDURE — 82550 ASSAY OF CK (CPK): CPT

## 2017-12-08 PROCEDURE — 80053 COMPREHEN METABOLIC PANEL: CPT

## 2017-12-08 PROCEDURE — 99233 SBSQ HOSP IP/OBS HIGH 50: CPT

## 2017-12-08 PROCEDURE — 99285 EMERGENCY DEPT VISIT HI MDM: CPT

## 2017-12-08 PROCEDURE — 96374 THER/PROPH/DIAG INJ IV PUSH: CPT

## 2017-12-08 PROCEDURE — 99222 1ST HOSP IP/OBS MODERATE 55: CPT

## 2017-12-08 PROCEDURE — 93005 ELECTROCARDIOGRAM TRACING: CPT

## 2017-12-08 PROCEDURE — 85027 COMPLETE CBC AUTOMATED: CPT

## 2017-12-08 PROCEDURE — 84484 ASSAY OF TROPONIN QUANT: CPT

## 2017-12-08 PROCEDURE — 71045 X-RAY EXAM CHEST 1 VIEW: CPT

## 2017-12-08 PROCEDURE — 81001 URINALYSIS AUTO W/SCOPE: CPT

## 2017-12-08 PROCEDURE — 71010: CPT | Mod: 26,77

## 2017-12-08 PROCEDURE — 71010: CPT | Mod: 26

## 2017-12-08 PROCEDURE — 94660 CPAP INITIATION&MGMT: CPT

## 2017-12-08 PROCEDURE — 85610 PROTHROMBIN TIME: CPT

## 2017-12-08 PROCEDURE — 93306 TTE W/DOPPLER COMPLETE: CPT | Mod: 26

## 2017-12-08 PROCEDURE — 99285 EMERGENCY DEPT VISIT HI MDM: CPT | Mod: 25

## 2017-12-08 PROCEDURE — 85730 THROMBOPLASTIN TIME PARTIAL: CPT

## 2017-12-08 PROCEDURE — 93010 ELECTROCARDIOGRAM REPORT: CPT

## 2017-12-08 PROCEDURE — 83880 ASSAY OF NATRIURETIC PEPTIDE: CPT

## 2017-12-08 PROCEDURE — 82553 CREATINE MB FRACTION: CPT

## 2017-12-08 RX ORDER — SODIUM CHLORIDE 9 MG/ML
3 INJECTION INTRAMUSCULAR; INTRAVENOUS; SUBCUTANEOUS EVERY 8 HOURS
Qty: 0 | Refills: 0 | Status: DISCONTINUED | OUTPATIENT
Start: 2017-12-08 | End: 2017-12-13

## 2017-12-08 RX ORDER — ALBUTEROL 90 UG/1
2.5 AEROSOL, METERED ORAL EVERY 6 HOURS
Qty: 0 | Refills: 0 | Status: DISCONTINUED | OUTPATIENT
Start: 2017-12-08 | End: 2017-12-11

## 2017-12-08 RX ORDER — ASPIRIN/CALCIUM CARB/MAGNESIUM 324 MG
81 TABLET ORAL ONCE
Qty: 0 | Refills: 0 | Status: COMPLETED | OUTPATIENT
Start: 2017-12-08 | End: 2017-12-08

## 2017-12-08 RX ORDER — FUROSEMIDE 40 MG
40 TABLET ORAL ONCE
Qty: 0 | Refills: 0 | Status: COMPLETED | OUTPATIENT
Start: 2017-12-08 | End: 2017-12-08

## 2017-12-08 RX ORDER — AMIODARONE HYDROCHLORIDE 400 MG/1
200 TABLET ORAL ONCE
Qty: 0 | Refills: 0 | Status: COMPLETED | OUTPATIENT
Start: 2017-12-08 | End: 2017-12-08

## 2017-12-08 RX ORDER — FUROSEMIDE 40 MG
20 TABLET ORAL
Qty: 0 | Refills: 0 | Status: DISCONTINUED | OUTPATIENT
Start: 2017-12-08 | End: 2017-12-13

## 2017-12-08 RX ORDER — ATORVASTATIN CALCIUM 80 MG/1
80 TABLET, FILM COATED ORAL AT BEDTIME
Qty: 0 | Refills: 0 | Status: DISCONTINUED | OUTPATIENT
Start: 2017-12-08 | End: 2017-12-13

## 2017-12-08 RX ORDER — LISINOPRIL 2.5 MG/1
5 TABLET ORAL DAILY
Qty: 0 | Refills: 0 | Status: COMPLETED | OUTPATIENT
Start: 2017-12-09 | End: 2017-12-11

## 2017-12-08 RX ORDER — TIOTROPIUM BROMIDE 18 UG/1
1 CAPSULE ORAL; RESPIRATORY (INHALATION) DAILY
Qty: 0 | Refills: 0 | Status: DISCONTINUED | OUTPATIENT
Start: 2017-12-08 | End: 2017-12-13

## 2017-12-08 RX ORDER — BUDESONIDE, MICRONIZED 100 %
0.5 POWDER (GRAM) MISCELLANEOUS
Qty: 0 | Refills: 0 | Status: DISCONTINUED | OUTPATIENT
Start: 2017-12-08 | End: 2017-12-08

## 2017-12-08 RX ORDER — INSULIN LISPRO 100/ML
VIAL (ML) SUBCUTANEOUS
Qty: 0 | Refills: 0 | Status: DISCONTINUED | OUTPATIENT
Start: 2017-12-08 | End: 2017-12-13

## 2017-12-08 RX ORDER — CARVEDILOL PHOSPHATE 80 MG/1
6.25 CAPSULE, EXTENDED RELEASE ORAL ONCE
Qty: 0 | Refills: 0 | Status: COMPLETED | OUTPATIENT
Start: 2017-12-08 | End: 2017-12-08

## 2017-12-08 RX ORDER — CARVEDILOL PHOSPHATE 80 MG/1
6.25 CAPSULE, EXTENDED RELEASE ORAL EVERY 12 HOURS
Qty: 0 | Refills: 0 | Status: DISCONTINUED | OUTPATIENT
Start: 2017-12-08 | End: 2017-12-13

## 2017-12-08 RX ORDER — MUPIROCIN 20 MG/G
1 OINTMENT TOPICAL
Qty: 0 | Refills: 0 | Status: DISCONTINUED | OUTPATIENT
Start: 2017-12-08 | End: 2017-12-13

## 2017-12-08 RX ORDER — AMIODARONE HYDROCHLORIDE 400 MG/1
200 TABLET ORAL DAILY
Qty: 0 | Refills: 0 | Status: DISCONTINUED | OUTPATIENT
Start: 2017-12-08 | End: 2017-12-13

## 2017-12-08 RX ORDER — BUDESONIDE AND FORMOTEROL FUMARATE DIHYDRATE 160; 4.5 UG/1; UG/1
2 AEROSOL RESPIRATORY (INHALATION)
Qty: 0 | Refills: 0 | Status: DISCONTINUED | OUTPATIENT
Start: 2017-12-08 | End: 2017-12-13

## 2017-12-08 RX ORDER — SODIUM CHLORIDE 9 MG/ML
3 INJECTION INTRAMUSCULAR; INTRAVENOUS; SUBCUTANEOUS ONCE
Qty: 0 | Refills: 0 | Status: COMPLETED | OUTPATIENT
Start: 2017-12-08 | End: 2017-12-08

## 2017-12-08 RX ORDER — NITROGLYCERIN 6.5 MG
0.4 CAPSULE, EXTENDED RELEASE ORAL ONCE
Qty: 0 | Refills: 0 | Status: COMPLETED | OUTPATIENT
Start: 2017-12-08 | End: 2017-12-08

## 2017-12-08 RX ORDER — IPRATROPIUM/ALBUTEROL SULFATE 18-103MCG
3 AEROSOL WITH ADAPTER (GRAM) INHALATION EVERY 6 HOURS
Qty: 0 | Refills: 0 | Status: DISCONTINUED | OUTPATIENT
Start: 2017-12-08 | End: 2017-12-08

## 2017-12-08 RX ADMIN — Medication 20 MILLIGRAM(S): at 18:39

## 2017-12-08 RX ADMIN — Medication 81 MILLIGRAM(S): at 04:37

## 2017-12-08 RX ADMIN — AMIODARONE HYDROCHLORIDE 200 MILLIGRAM(S): 400 TABLET ORAL at 16:06

## 2017-12-08 RX ADMIN — Medication 2: at 11:10

## 2017-12-08 RX ADMIN — Medication 40 MILLIGRAM(S): at 04:15

## 2017-12-08 RX ADMIN — Medication 0.4 MILLIGRAM(S): at 04:19

## 2017-12-08 RX ADMIN — AMIODARONE HYDROCHLORIDE 200 MILLIGRAM(S): 400 TABLET ORAL at 06:22

## 2017-12-08 RX ADMIN — SODIUM CHLORIDE 3 MILLILITER(S): 9 INJECTION INTRAMUSCULAR; INTRAVENOUS; SUBCUTANEOUS at 16:05

## 2017-12-08 RX ADMIN — SODIUM CHLORIDE 3 MILLILITER(S): 9 INJECTION INTRAMUSCULAR; INTRAVENOUS; SUBCUTANEOUS at 21:02

## 2017-12-08 RX ADMIN — CARVEDILOL PHOSPHATE 6.25 MILLIGRAM(S): 80 CAPSULE, EXTENDED RELEASE ORAL at 18:39

## 2017-12-08 RX ADMIN — SODIUM CHLORIDE 3 MILLILITER(S): 9 INJECTION INTRAMUSCULAR; INTRAVENOUS; SUBCUTANEOUS at 04:19

## 2017-12-08 RX ADMIN — ATORVASTATIN CALCIUM 80 MILLIGRAM(S): 80 TABLET, FILM COATED ORAL at 21:06

## 2017-12-08 RX ADMIN — CARVEDILOL PHOSPHATE 6.25 MILLIGRAM(S): 80 CAPSULE, EXTENDED RELEASE ORAL at 06:35

## 2017-12-08 NOTE — H&P ADULT - PROBLEM SELECTOR PLAN 2
Hold Coumadin for supratherapeutic INR  Start heparin once INR < 2  Has PPM, should be evaluated by [employer] prior to OR

## 2017-12-08 NOTE — ED ADULT NURSE REASSESSMENT NOTE - NS ED NURSE REASSESS COMMENT FT1
o2 35% rate 15 i/e 12/5 on bipap denies chest or abd pains no pedal edema monitor a fib ptpending transfer

## 2017-12-08 NOTE — H&P ADULT - NSHPREVIEWOFSYSTEMS_GEN_ALL_CORE
Admits to shortness of breath.  Denies active chest pain, dizziness, syncope, abdominal pain, nausea, vomiting, urinary incontinence/frequency/urgency, fevers, chills, rigors.

## 2017-12-08 NOTE — H&P ADULT - NSHPSOCIALHISTORY_GEN_ALL_CORE
Patient an active smoker (1/4 - 1/2 pack/day x 60 years)  Patient denies alcohol and illicit drug use.  Retired, lives with wife in two story home. Performs own ADLs (drives, no assistive ambulation equipment).

## 2017-12-08 NOTE — H&P ADULT - NSHPLABSRESULTS_GEN_ALL_CORE
per chart: Cath showing "moderate lesion in the proximal LAD, tight lesion in ramus, moderate lesion in left main, and a tight lesion in the PDA."

## 2017-12-08 NOTE — ED PROVIDER NOTE - MEDICAL DECISION MAKING DETAILS
77 y.o. M with chf/cad, awaiting triple bypass next week at [facility], presents with CHF decompensation, improved on bipap, given nitro, asa, lasix, coreg and amiodarone, to be transferred to [facility] Dr. Denny accepting

## 2017-12-08 NOTE — ED ADULT NURSE REASSESSMENT NOTE - NS ED NURSE REASSESS COMMENT FT1
pt is stable, breathing much more comfortable at this time on N/C @ 4 LPM. pt pending transfer to [place].

## 2017-12-08 NOTE — ED PROVIDER NOTE - OBJECTIVE STATEMENT
77 y.o. M BIBEMS for SOB - pt is known to have CAD, CHF, poor EF, met with Dr. Denny at [facility] earlier this week, told he needs to have a triple CABG, scheduled for Wednesday (5 days), 77 y.o. M BIBEMS for SOB - pt is known to have CAD, CHF, poor EF, met with Dr. Denny at [facility] earlier this week, told he needs to have a triple CABG, scheduled for Wednesday (5 days), told he has a very weak heart and is high risk for having a heart attack, wife noted his breathing has been more labored since yesterday, pt has been trying to quit cigarettes, still smoking per wife,

## 2017-12-08 NOTE — H&P ADULT - ASSESSMENT
Patient Stalin Ronquillo is a 77 year old male with significant past medical history of atrial fibrillation on Amiodarone and Coumadin, CHF on Coreg and Lisinopril, COPD on Spiriva, DM II on Metformin, HTN, [employer] PPM, CVA, and PCI, known to cardiac surgery service as a preoperative patient for CABG, was brought in by EMS to [facility] 12/8/17 with complaints of gradual onset of shortness of breath and waxing and waning 8/10 chest tightness. In ER, patient was treated with sublingual NTG and BIPAP, found to have elevated BNP, negative cardiac enzymes, and supra therapeutic INR (3.97). Patient was transferred to [facility] in preparation of CABG and preoperative medical optimization. On admission, patient presented with BIPAP mask and asymptomatic with stable vital signs, BIPAP quickly came off with stable hemodynamics and patient urgency to take the mask off. Physical exam is significant for soft holosystolic murmur, otherwise benign. TTE was obtained with verbal read showing EF 20%, significant MR, no pericardial effusion. CXR reviewed. Patient case was discussed with Dr. Denny, further plan includes optimizing acute CHF exacerbation with diuretics as tolerated, holding anticoagulation at this time given supratherapeutic INR, and plan for CABG on Wednesday next week.

## 2017-12-08 NOTE — H&P ADULT - PROBLEM SELECTOR PLAN 1
ASA, BB, Statin preoperative for CABG Wednesday 12/13/17   NTG and Morphine PRN  cardiac surgery preop order set, follow up preop testing (TTE official read)

## 2017-12-08 NOTE — CONSULT NOTE ADULT - SUBJECTIVE AND OBJECTIVE BOX
PULMONARY CONSULT NOTE      YOGI LYNNN-[id number]    Patient is a 77y old  Male who presents with a chief complaint of I'm short of breath, my arteries are clogged. (06 Dec 2017 13:21)      HISTORY OF PRESENT ILLNESS:PULMONARY CONSULT NOTE      YOGI LYNNN-[id number]    Patient is a 77y old  Male who presents with a chief complaint of I'm short of breath, my arteries are clogged. (06 Dec 2017 13:21)      HISTORY OF PRESENT ILLNESS:  76 yo male with >40pk yr smoking with COPD seen Pre-op CABG and post episode CHF. Has been recently maintained on symbicort and spiriva. Denies current baseline cough, wheeze or sputum. Has not been on steroids.    MEDICATIONS  (STANDING):  ALBUTerol/ipratropium for Nebulization 3 milliLiter(s) Nebulizer every 6 hours  amiodarone    Tablet 200 milliGRAM(s) Oral daily  atorvastatin 80 milliGRAM(s) Oral at bedtime  buDESOnide   0.5 milliGRAM(s) Respule 0.5 milliGRAM(s) Inhalation two times a day  carvedilol 6.25 milliGRAM(s) Oral every 12 hours  insulin lispro (HumaLOG) corrective regimen sliding scale   SubCutaneous Before meals and at bedtime  sodium chloride 0.9% lock flush 3 milliLiter(s) IV Push every 8 hours      MEDICATIONS  (PRN):      Allergies    penicillin (Anaphylaxis)    Intolerances        PAST MEDICAL & SURGICAL HISTORY:  Hypertension  Stroke  CHF (congestive heart failure)  Stented coronary artery  MI, old  Asthma  Atrial fibrillation  Diabetes  Bronchitis  Hyperlipidemia  Pacemaker  COPD (chronic obstructive pulmonary disease)  Cardiac arrhythmia  H/O hernia repair  Cardiac pacemaker      FAMILY HISTORY:  Family history unknown: adopted      SOCIAL HISTORY  Smoking History:     REVIEW OF SYSTEMS:    CONSTITUTIONAL:  No fevers, chills, sweats    HEENT:  Eyes:  No diplopia or blurred vision. ENT:  No earache, sore throat or runny nose. sinus headache or postnasl drip    CARDIOVASCULAR:  No pressure, squeezing, tightness, or heaviness about the chest; no palpitations, leg swelling, orthopnea or PND    RESPIRATORY:  above    GASTROINTESTINAL:  No abdominal pain, nausea, vomiting or diarrhea.    GENITOURINARY:  No dysuria, frequency or urgency.    NEUROLOGIC:  No paresthesias, fasciculations, seizures or weakness.    PSYCHIATRIC:  No disorder of thought or mood.    Vital Signs Last 24 Hrs  T(C): 36.4 (08 Dec 2017 12:00), Max: 36.6 (08 Dec 2017 04:07)  T(F): 97.6 (08 Dec 2017 12:00), Max: 97.9 (08 Dec 2017 08:58)  HR: 88 (08 Dec 2017 13:00) (88 - 124)  BP: 135/65 (08 Dec 2017 13:00) (101/48 - 156/106)  BP(mean): 93 (08 Dec 2017 13:00) (60 - 104)  RR: 23 (08 Dec 2017 13:00) (18 - 30)  SpO2: 100% (08 Dec 2017 13:00) (98% - 100%)    PHYSICAL EXAMINATION:    GENERAL: The patient is a well-developed, well-nourished _____in no apparent distress.     HEENT: Head is normocephalic and atraumatic. Extraocular muscles are intact. Mucous membranes are moist.     NECK: Supple.     LUNGS: Clear to auscultation without wheezing, rales, or rhonchi. Respirations unlabored    HEART: Regular rate and rhythm without murmur.    ABDOMEN: Soft, nontender, and nondistended.  No hepatosplenomegaly is noted.    EXTREMITIES: Without any cyanosis, clubbing, rash, lesions or edema.    NEUROLOGIC: Grossly intact.      LABS:                        15.0   5.8   )-----------( 127      ( 08 Dec 2017 13:36 )             44.0     12-08    143  |  104  |  27.0<H>  ----------------------------<  162<H>  4.1   |  24.0  |  1.41<H>    Ca    8.7      08 Dec 2017 13:36  Phos  2.9     12-  Mg     1.8     12-    TPro  7.0  /  Alb  3.9  /  TBili  2.2<H>  /  DBili  0.2  /  AST  19  /  ALT  20  /  AlkPhos  90  12-08    PT/INR - ( 08 Dec 2017 13:36 )   PT: 45.1 sec;   INR: 3.99 ratio         PTT - ( 08 Dec 2017 13:36 )  PTT:52.3 sec  Urinalysis Basic - ( 08 Dec 2017 05:46 )    Color: Yellow / Appearance: Clear / S.015 / pH: x  Gluc: x / Ketone: Negative  / Bili: Negative / Urobili: Negative mg/dL   Blood: x / Protein: 15 mg/dL / Nitrite: Negative   Leuk Esterase: Negative / RBC: 0-2 /HPF / WBC x   Sq Epi: x / Non Sq Epi: x / Bacteria: x        CARDIAC MARKERS ( 08 Dec 2017 13:36 )  x     / <0.01 ng/mL / 37 U/L / x     / x      CARDIAC MARKERS ( 08 Dec 2017 04:21 )  .004 ng/mL / x     / 45 U/L / x     / 0.7 ng/mL        Serum Pro-Brain Natriuretic Peptide: 5334 pg/mL (17 @ 13:36)  Serum Pro-Brain Natriuretic Peptide: 4430 pg/mL (17 @ 04:21)  Serum Pro-Brain Natriuretic Peptide: 3897 pg/mL (17 @ 14:29)          MICROBIOLOGY:    RADIOLOGY & ADDITIONAL STUDIES:    MEDICATIONS  (STANDING):  ALBUTerol/ipratropium for Nebulization 3 milliLiter(s) Nebulizer every 6 hours  amiodarone    Tablet 200 milliGRAM(s) Oral daily  atorvastatin 80 milliGRAM(s) Oral at bedtime  buDESOnide   0.5 milliGRAM(s) Respule 0.5 milliGRAM(s) Inhalation two times a day  carvedilol 6.25 milliGRAM(s) Oral every 12 hours  insulin lispro (HumaLOG) corrective regimen sliding scale   SubCutaneous Before meals and at bedtime  sodium chloride 0.9% lock flush 3 milliLiter(s) IV Push every 8 hours      MEDICATIONS  (PRN):      Allergies    penicillin (Anaphylaxis)    Intolerances        PAST MEDICAL & SURGICAL HISTORY:  Hypertension  Stroke  CHF (congestive heart failure)  Stented coronary artery  MI, old  Asthma  Atrial fibrillation  Diabetes  Bronchitis  Hyperlipidemia  Pacemaker  COPD (chronic obstructive pulmonary disease)  Cardiac arrhythmia  H/O hernia repair  Cardiac pacemaker      FAMILY HISTORY:  Family history unknown: adopted      SOCIAL HISTORY  Smoking History:     REVIEW OF SYSTEMS:    CONSTITUTIONAL:  No fevers, chills, sweats    HEENT:  Eyes:  No diplopia or blurred vision. ENT:  No earache, sore throat or runny nose. sinus headache or postnasl drip    CARDIOVASCULAR:  No pressure, squeezing, tightness, or heaviness about the chest; no palpitations, leg swelling, orthopnea or PND    RESPIRATORY:  No cough, shortness of breath, PND or orthopnea. Mild SOBOE    GASTROINTESTINAL:  No abdominal pain, nausea, vomiting or diarrhea.    GENITOURINARY:  No dysuria, frequency or urgency.    NEUROLOGIC:  No paresthesias, fasciculations, seizures or weakness.    PSYCHIATRIC:  No disorder of thought or mood.    Vital Signs Last 24 Hrs  T(C): 36.4 (08 Dec 2017 12:00), Max: 36.6 (08 Dec 2017 04:07)  T(F): 97.6 (08 Dec 2017 12:00), Max: 97.9 (08 Dec 2017 08:58)  HR: 88 (08 Dec 2017 13:00) (88 - 124)  BP: 135/65 (08 Dec 2017 13:00) (101/48 - 156/106)  BP(mean): 93 (08 Dec 2017 13:00) (60 - 104)  RR: 23 (08 Dec 2017 13:00) (18 - 30)  SpO2: 100% (08 Dec 2017 13:00) (98% - 100%)    PHYSICAL EXAMINATION:    GENERAL: The patient is a well-developed, well-nourished _____in no apparent distress.     HEENT: Head is normocephalic and atraumatic. Extraocular muscles are intact. Mucous membranes are moist.     NECK: Supple.     LUNGS: Clear to auscultation without wheezing, rales, or rhonchi. Respirations unlabored    HEART: Regular rate and rhythm without murmur.    ABDOMEN: Soft, nontender, and nondistended.  No hepatosplenomegaly is noted.    EXTREMITIES: Without any cyanosis, clubbing, rash, lesions or edema.    NEUROLOGIC: Grossly intact.      LABS:                        15.0   5.8   )-----------( 127      ( 08 Dec 2017 13:36 )             44.0     12-08    143  |  104  |  27.0<H>  ----------------------------<  162<H>  4.1   |  24.0  |  1.41<H>    Ca    8.7      08 Dec 2017 13:36  Phos  2.9     12-08  Mg     1.8     12-08    TPro  7.0  /  Alb  3.9  /  TBili  2.2<H>  /  DBili  0.2  /  AST  19  /  ALT  20  /  AlkPhos  90  12    PT/INR - ( 08 Dec 2017 13:36 )   PT: 45.1 sec;   INR: 3.99 ratio         PTT - ( 08 Dec 2017 13:36 )  PTT:52.3 sec  Urinalysis Basic - ( 08 Dec 2017 05:46 )    Color: Yellow / Appearance: Clear / S.015 / pH: x  Gluc: x / Ketone: Negative  / Bili: Negative / Urobili: Negative mg/dL   Blood: x / Protein: 15 mg/dL / Nitrite: Negative   Leuk Esterase: Negative / RBC: 0-2 /HPF / WBC x   Sq Epi: x / Non Sq Epi: x / Bacteria: x        CARDIAC MARKERS ( 08 Dec 2017 13:36 )  x     / <0.01 ng/mL / 37 U/L / x     / x      CARDIAC MARKERS ( 08 Dec 2017 04:21 )  .004 ng/mL / x     / 45 U/L / x     / 0.7 ng/mL        Serum Pro-Brain Natriuretic Peptide: 5334 pg/mL (17 @ 13:36)  Serum Pro-Brain Natriuretic Peptide: 4430 pg/mL (17 @ 04:21)  Serum Pro-Brain Natriuretic Peptide: 3897 pg/mL (17 @ 14:29)          MICROBIOLOGY:    RADIOLOGY & ADDITIONAL STUDIES:< from: Xray Chest 1 View AP/PA. (17 @ 09:47) >   EXAM:  CHEST SINGLE VIEW FRONTAL                          PROCEDURE DATE:  2017          INTERPRETATION:  CHEST AP PORTABLE:    History: SOB.     Date and time of exam: 2017 9:37 AM.    Technique: A single AP view of the chest was obtained.    Comparison exam: 2017 4:25 AM.    Findings:  Decreasing left lung infiltrate compared with the prior study. No acute   infiltrate noted on the current examination. No evidence of pleural   effusion or pneumothorax. The heart is not enlarged. No hilar or   mediastinal abnormality..    Impression:  Resolution of left lung infiltrate since the prior study..                BENSON CHAMORRO M.D., ATTENDING RADIOLOGIST  This document has been electronically signed. Dec  8 2017  9:58AM    films reviewed on PACS

## 2017-12-08 NOTE — H&P ADULT - PROBLEM SELECTOR PLAN 4
Lasix PRN as tolerated by creatinine  Cardiac surgery preop order set          Plan discussed with Dr. Denny.

## 2017-12-08 NOTE — H&P ADULT - NSHPPHYSICALEXAM_GEN_ALL_CORE
Neuro: AxO x 3  Cardiac: S1S2, holosystolic murmur, atrial fibrillation  Pulmonary: CTA b/l, no rhonchi or rales  Abdomen: Soft, NT, ND, +BS  Peripheral: + pedal pulses b/l, no edema

## 2017-12-09 DIAGNOSIS — I50.23 ACUTE ON CHRONIC SYSTOLIC (CONGESTIVE) HEART FAILURE: ICD-10-CM

## 2017-12-09 DIAGNOSIS — I25.10 ATHEROSCLEROTIC HEART DISEASE OF NATIVE CORONARY ARTERY WITHOUT ANGINA PECTORIS: ICD-10-CM

## 2017-12-09 DIAGNOSIS — J42 UNSPECIFIED CHRONIC BRONCHITIS: ICD-10-CM

## 2017-12-09 LAB
ANION GAP SERPL CALC-SCNC: 14 MMOL/L — SIGNIFICANT CHANGE UP (ref 5–17)
APPEARANCE UR: CLEAR — SIGNIFICANT CHANGE UP
APPEARANCE UR: CLEAR — SIGNIFICANT CHANGE UP
APTT BLD: 55.4 SEC — HIGH (ref 27.5–37.4)
BACTERIA # UR AUTO: ABNORMAL
BILIRUB UR-MCNC: NEGATIVE — SIGNIFICANT CHANGE UP
BILIRUB UR-MCNC: NEGATIVE — SIGNIFICANT CHANGE UP
BUN SERPL-MCNC: 30 MG/DL — HIGH (ref 8–20)
CALCIUM SERPL-MCNC: 8.6 MG/DL — SIGNIFICANT CHANGE UP (ref 8.6–10.2)
CHLORIDE SERPL-SCNC: 103 MMOL/L — SIGNIFICANT CHANGE UP (ref 98–107)
CHLORIDE UR-SCNC: 128 MMOL/L — SIGNIFICANT CHANGE UP
CO2 SERPL-SCNC: 26 MMOL/L — SIGNIFICANT CHANGE UP (ref 22–29)
COLOR SPEC: YELLOW — SIGNIFICANT CHANGE UP
COLOR SPEC: YELLOW — SIGNIFICANT CHANGE UP
CREAT ?TM UR-MCNC: 39 MG/DL — SIGNIFICANT CHANGE UP
CREAT SERPL-MCNC: 1.52 MG/DL — HIGH (ref 0.5–1.3)
CULTURE RESULTS: NO GROWTH — SIGNIFICANT CHANGE UP
DIFF PNL FLD: NEGATIVE — SIGNIFICANT CHANGE UP
DIFF PNL FLD: NEGATIVE — SIGNIFICANT CHANGE UP
GLUCOSE BLDC GLUCOMTR-MCNC: 115 MG/DL — HIGH (ref 70–99)
GLUCOSE BLDC GLUCOMTR-MCNC: 119 MG/DL — HIGH (ref 70–99)
GLUCOSE BLDC GLUCOMTR-MCNC: 147 MG/DL — HIGH (ref 70–99)
GLUCOSE BLDC GLUCOMTR-MCNC: 158 MG/DL — HIGH (ref 70–99)
GLUCOSE SERPL-MCNC: 121 MG/DL — HIGH (ref 70–115)
GLUCOSE UR QL: NEGATIVE MG/DL — SIGNIFICANT CHANGE UP
GLUCOSE UR QL: NEGATIVE MG/DL — SIGNIFICANT CHANGE UP
HCT VFR BLD CALC: 42.7 % — SIGNIFICANT CHANGE UP (ref 42–52)
HGB BLD-MCNC: 14.5 G/DL — SIGNIFICANT CHANGE UP (ref 14–18)
INR BLD: 4.1 RATIO — HIGH (ref 0.88–1.16)
KETONES UR-MCNC: NEGATIVE — SIGNIFICANT CHANGE UP
KETONES UR-MCNC: NEGATIVE — SIGNIFICANT CHANGE UP
LEUKOCYTE ESTERASE UR-ACNC: NEGATIVE — SIGNIFICANT CHANGE UP
LEUKOCYTE ESTERASE UR-ACNC: NEGATIVE — SIGNIFICANT CHANGE UP
MAGNESIUM SERPL-MCNC: 1.8 MG/DL — SIGNIFICANT CHANGE UP (ref 1.6–2.6)
MCHC RBC-ENTMCNC: 31.7 PG — HIGH (ref 27–31)
MCHC RBC-ENTMCNC: 34 G/DL — SIGNIFICANT CHANGE UP (ref 32–36)
MCV RBC AUTO: 93.4 FL — SIGNIFICANT CHANGE UP (ref 80–94)
NITRITE UR-MCNC: NEGATIVE — SIGNIFICANT CHANGE UP
NITRITE UR-MCNC: NEGATIVE — SIGNIFICANT CHANGE UP
OSMOLALITY UR: 414 MOSM/KG — SIGNIFICANT CHANGE UP (ref 300–1000)
PH UR: 5 — SIGNIFICANT CHANGE UP (ref 5–8)
PH UR: 6 — SIGNIFICANT CHANGE UP (ref 5–8)
PLATELET # BLD AUTO: 116 K/UL — LOW (ref 150–400)
POTASSIUM SERPL-MCNC: 4 MMOL/L — SIGNIFICANT CHANGE UP (ref 3.5–5.3)
POTASSIUM SERPL-SCNC: 4 MMOL/L — SIGNIFICANT CHANGE UP (ref 3.5–5.3)
PROT ?TM UR-MCNC: 5 MG/DL — SIGNIFICANT CHANGE UP (ref 0–12)
PROT UR-MCNC: NEGATIVE MG/DL — SIGNIFICANT CHANGE UP
PROT UR-MCNC: NEGATIVE MG/DL — SIGNIFICANT CHANGE UP
PROT/CREAT UR-RTO: 0.1 RATIO — SIGNIFICANT CHANGE UP
PROTHROM AB SERPL-ACNC: 46.4 SEC — HIGH (ref 9.8–12.7)
RBC # BLD: 4.57 M/UL — LOW (ref 4.6–6.2)
RBC # FLD: 16.2 % — HIGH (ref 11–15.6)
SODIUM SERPL-SCNC: 143 MMOL/L — SIGNIFICANT CHANGE UP (ref 135–145)
SODIUM UR-SCNC: 131 MMOL/L — SIGNIFICANT CHANGE UP
SP GR SPEC: 1.01 — SIGNIFICANT CHANGE UP (ref 1.01–1.02)
SP GR SPEC: 1.01 — SIGNIFICANT CHANGE UP (ref 1.01–1.02)
SPECIMEN SOURCE: SIGNIFICANT CHANGE UP
UROBILINOGEN FLD QL: NEGATIVE MG/DL — SIGNIFICANT CHANGE UP
UROBILINOGEN FLD QL: NEGATIVE MG/DL — SIGNIFICANT CHANGE UP
WBC # BLD: 6 K/UL — SIGNIFICANT CHANGE UP (ref 4.8–10.8)
WBC # FLD AUTO: 6 K/UL — SIGNIFICANT CHANGE UP (ref 4.8–10.8)
WBC UR QL: SIGNIFICANT CHANGE UP

## 2017-12-09 PROCEDURE — 93880 EXTRACRANIAL BILAT STUDY: CPT | Mod: 26

## 2017-12-09 PROCEDURE — 76770 US EXAM ABDO BACK WALL COMP: CPT | Mod: 26

## 2017-12-09 PROCEDURE — 99223 1ST HOSP IP/OBS HIGH 75: CPT

## 2017-12-09 RX ORDER — SPIRONOLACTONE 25 MG/1
25 TABLET, FILM COATED ORAL DAILY
Qty: 0 | Refills: 0 | Status: DISCONTINUED | OUTPATIENT
Start: 2017-12-09 | End: 2017-12-13

## 2017-12-09 RX ORDER — MAGNESIUM SULFATE 500 MG/ML
2 VIAL (ML) INJECTION ONCE
Qty: 0 | Refills: 0 | Status: COMPLETED | OUTPATIENT
Start: 2017-12-09 | End: 2017-12-09

## 2017-12-09 RX ORDER — DOCUSATE SODIUM 100 MG
100 CAPSULE ORAL THREE TIMES A DAY
Qty: 0 | Refills: 0 | Status: DISCONTINUED | OUTPATIENT
Start: 2017-12-09 | End: 2017-12-13

## 2017-12-09 RX ADMIN — SPIRONOLACTONE 25 MILLIGRAM(S): 25 TABLET, FILM COATED ORAL at 07:57

## 2017-12-09 RX ADMIN — ALBUTEROL 2.5 MILLIGRAM(S): 90 AEROSOL, METERED ORAL at 20:28

## 2017-12-09 RX ADMIN — CARVEDILOL PHOSPHATE 6.25 MILLIGRAM(S): 80 CAPSULE, EXTENDED RELEASE ORAL at 06:18

## 2017-12-09 RX ADMIN — SODIUM CHLORIDE 3 MILLILITER(S): 9 INJECTION INTRAMUSCULAR; INTRAVENOUS; SUBCUTANEOUS at 11:27

## 2017-12-09 RX ADMIN — TIOTROPIUM BROMIDE 1 CAPSULE(S): 18 CAPSULE ORAL; RESPIRATORY (INHALATION) at 08:34

## 2017-12-09 RX ADMIN — MUPIROCIN 1 APPLICATION(S): 20 OINTMENT TOPICAL at 06:18

## 2017-12-09 RX ADMIN — Medication 20 MILLIGRAM(S): at 06:18

## 2017-12-09 RX ADMIN — SODIUM CHLORIDE 3 MILLILITER(S): 9 INJECTION INTRAMUSCULAR; INTRAVENOUS; SUBCUTANEOUS at 05:09

## 2017-12-09 RX ADMIN — SODIUM CHLORIDE 3 MILLILITER(S): 9 INJECTION INTRAMUSCULAR; INTRAVENOUS; SUBCUTANEOUS at 22:12

## 2017-12-09 RX ADMIN — MUPIROCIN 1 APPLICATION(S): 20 OINTMENT TOPICAL at 17:50

## 2017-12-09 RX ADMIN — Medication 20 MILLIGRAM(S): at 17:50

## 2017-12-09 RX ADMIN — CARVEDILOL PHOSPHATE 6.25 MILLIGRAM(S): 80 CAPSULE, EXTENDED RELEASE ORAL at 17:50

## 2017-12-09 RX ADMIN — ALBUTEROL 2.5 MILLIGRAM(S): 90 AEROSOL, METERED ORAL at 15:55

## 2017-12-09 RX ADMIN — ALBUTEROL 2.5 MILLIGRAM(S): 90 AEROSOL, METERED ORAL at 08:40

## 2017-12-09 RX ADMIN — AMIODARONE HYDROCHLORIDE 200 MILLIGRAM(S): 400 TABLET ORAL at 06:18

## 2017-12-09 RX ADMIN — Medication 100 MILLIGRAM(S): at 22:12

## 2017-12-09 RX ADMIN — LISINOPRIL 5 MILLIGRAM(S): 2.5 TABLET ORAL at 06:18

## 2017-12-09 RX ADMIN — BUDESONIDE AND FORMOTEROL FUMARATE DIHYDRATE 2 PUFF(S): 160; 4.5 AEROSOL RESPIRATORY (INHALATION) at 20:31

## 2017-12-09 RX ADMIN — ATORVASTATIN CALCIUM 80 MILLIGRAM(S): 80 TABLET, FILM COATED ORAL at 22:12

## 2017-12-09 RX ADMIN — Medication 50 GRAM(S): at 05:14

## 2017-12-09 NOTE — PROGRESS NOTE ADULT - ASSESSMENT
77yMale COPD, acute on chronic heart failure, + 3vd, + MR, admitted for acute on chronic chf exceerbation.  Pt currently set for preop CABG on 12/13.

## 2017-12-10 LAB
ANION GAP SERPL CALC-SCNC: 12 MMOL/L — SIGNIFICANT CHANGE UP (ref 5–17)
BUN SERPL-MCNC: 37 MG/DL — HIGH (ref 8–20)
CALCIUM SERPL-MCNC: 9 MG/DL — SIGNIFICANT CHANGE UP (ref 8.6–10.2)
CALCIUM SERPL-MCNC: 9.2 MG/DL — SIGNIFICANT CHANGE UP (ref 8.4–10.5)
CHLORIDE SERPL-SCNC: 102 MMOL/L — SIGNIFICANT CHANGE UP (ref 98–107)
CO2 SERPL-SCNC: 27 MMOL/L — SIGNIFICANT CHANGE UP (ref 22–29)
CREAT SERPL-MCNC: 1.73 MG/DL — HIGH (ref 0.5–1.3)
GLUCOSE BLDC GLUCOMTR-MCNC: 130 MG/DL — HIGH (ref 70–99)
GLUCOSE BLDC GLUCOMTR-MCNC: 152 MG/DL — HIGH (ref 70–99)
GLUCOSE BLDC GLUCOMTR-MCNC: 173 MG/DL — HIGH (ref 70–99)
GLUCOSE BLDC GLUCOMTR-MCNC: 199 MG/DL — HIGH (ref 70–99)
GLUCOSE SERPL-MCNC: 153 MG/DL — HIGH (ref 70–115)
HCT VFR BLD CALC: 45.1 % — SIGNIFICANT CHANGE UP (ref 42–52)
HGB BLD-MCNC: 15.2 G/DL — SIGNIFICANT CHANGE UP (ref 14–18)
INR BLD: 3.1 RATIO — HIGH (ref 0.88–1.16)
MAGNESIUM SERPL-MCNC: 2.4 MG/DL — SIGNIFICANT CHANGE UP (ref 1.6–2.6)
MCHC RBC-ENTMCNC: 31.5 PG — HIGH (ref 27–31)
MCHC RBC-ENTMCNC: 33.7 G/DL — SIGNIFICANT CHANGE UP (ref 32–36)
MCV RBC AUTO: 93.6 FL — SIGNIFICANT CHANGE UP (ref 80–94)
PHOSPHATE SERPL-MCNC: 3.4 MG/DL — SIGNIFICANT CHANGE UP (ref 2.4–4.7)
PLATELET # BLD AUTO: 126 K/UL — LOW (ref 150–400)
POTASSIUM SERPL-MCNC: 5.1 MMOL/L — SIGNIFICANT CHANGE UP (ref 3.5–5.3)
POTASSIUM SERPL-SCNC: 5.1 MMOL/L — SIGNIFICANT CHANGE UP (ref 3.5–5.3)
PROTHROM AB SERPL-ACNC: 34.9 SEC — HIGH (ref 9.8–12.7)
PTH-INTACT FLD-MCNC: 66 PG/ML — HIGH (ref 15–65)
RBC # BLD: 4.82 M/UL — SIGNIFICANT CHANGE UP (ref 4.6–6.2)
RBC # FLD: 16.2 % — HIGH (ref 11–15.6)
SODIUM SERPL-SCNC: 141 MMOL/L — SIGNIFICANT CHANGE UP (ref 135–145)
WBC # BLD: 6.8 K/UL — SIGNIFICANT CHANGE UP (ref 4.8–10.8)
WBC # FLD AUTO: 6.8 K/UL — SIGNIFICANT CHANGE UP (ref 4.8–10.8)

## 2017-12-10 PROCEDURE — 71010: CPT | Mod: 26

## 2017-12-10 PROCEDURE — 93010 ELECTROCARDIOGRAM REPORT: CPT

## 2017-12-10 PROCEDURE — 99233 SBSQ HOSP IP/OBS HIGH 50: CPT

## 2017-12-10 RX ORDER — ASPIRIN/CALCIUM CARB/MAGNESIUM 324 MG
81 TABLET ORAL DAILY
Qty: 0 | Refills: 0 | Status: DISCONTINUED | OUTPATIENT
Start: 2017-12-10 | End: 2017-12-13

## 2017-12-10 RX ORDER — CHLORHEXIDINE GLUCONATE 213 G/1000ML
15 SOLUTION TOPICAL
Qty: 0 | Refills: 0 | Status: DISCONTINUED | OUTPATIENT
Start: 2017-12-10 | End: 2017-12-13

## 2017-12-10 RX ORDER — CHLORHEXIDINE GLUCONATE 213 G/1000ML
1 SOLUTION TOPICAL
Qty: 0 | Refills: 0 | Status: DISCONTINUED | OUTPATIENT
Start: 2017-12-11 | End: 2017-12-13

## 2017-12-10 RX ADMIN — CARVEDILOL PHOSPHATE 6.25 MILLIGRAM(S): 80 CAPSULE, EXTENDED RELEASE ORAL at 05:38

## 2017-12-10 RX ADMIN — SODIUM CHLORIDE 3 MILLILITER(S): 9 INJECTION INTRAMUSCULAR; INTRAVENOUS; SUBCUTANEOUS at 21:26

## 2017-12-10 RX ADMIN — MUPIROCIN 1 APPLICATION(S): 20 OINTMENT TOPICAL at 05:38

## 2017-12-10 RX ADMIN — SODIUM CHLORIDE 3 MILLILITER(S): 9 INJECTION INTRAMUSCULAR; INTRAVENOUS; SUBCUTANEOUS at 06:01

## 2017-12-10 RX ADMIN — CARVEDILOL PHOSPHATE 6.25 MILLIGRAM(S): 80 CAPSULE, EXTENDED RELEASE ORAL at 17:32

## 2017-12-10 RX ADMIN — Medication 2: at 07:45

## 2017-12-10 RX ADMIN — AMIODARONE HYDROCHLORIDE 200 MILLIGRAM(S): 400 TABLET ORAL at 05:38

## 2017-12-10 RX ADMIN — SODIUM CHLORIDE 3 MILLILITER(S): 9 INJECTION INTRAMUSCULAR; INTRAVENOUS; SUBCUTANEOUS at 09:59

## 2017-12-10 RX ADMIN — ALBUTEROL 2.5 MILLIGRAM(S): 90 AEROSOL, METERED ORAL at 16:36

## 2017-12-10 RX ADMIN — Medication 100 MILLIGRAM(S): at 05:38

## 2017-12-10 RX ADMIN — Medication 2: at 12:29

## 2017-12-10 RX ADMIN — BUDESONIDE AND FORMOTEROL FUMARATE DIHYDRATE 2 PUFF(S): 160; 4.5 AEROSOL RESPIRATORY (INHALATION) at 20:32

## 2017-12-10 RX ADMIN — Medication 2: at 17:32

## 2017-12-10 RX ADMIN — BUDESONIDE AND FORMOTEROL FUMARATE DIHYDRATE 2 PUFF(S): 160; 4.5 AEROSOL RESPIRATORY (INHALATION) at 09:27

## 2017-12-10 RX ADMIN — ALBUTEROL 2.5 MILLIGRAM(S): 90 AEROSOL, METERED ORAL at 04:31

## 2017-12-10 RX ADMIN — SPIRONOLACTONE 25 MILLIGRAM(S): 25 TABLET, FILM COATED ORAL at 05:37

## 2017-12-10 RX ADMIN — ATORVASTATIN CALCIUM 80 MILLIGRAM(S): 80 TABLET, FILM COATED ORAL at 21:30

## 2017-12-10 RX ADMIN — LISINOPRIL 5 MILLIGRAM(S): 2.5 TABLET ORAL at 05:37

## 2017-12-10 RX ADMIN — MUPIROCIN 1 APPLICATION(S): 20 OINTMENT TOPICAL at 17:32

## 2017-12-10 RX ADMIN — TIOTROPIUM BROMIDE 1 CAPSULE(S): 18 CAPSULE ORAL; RESPIRATORY (INHALATION) at 09:27

## 2017-12-10 RX ADMIN — ALBUTEROL 2.5 MILLIGRAM(S): 90 AEROSOL, METERED ORAL at 09:27

## 2017-12-10 RX ADMIN — ALBUTEROL 2.5 MILLIGRAM(S): 90 AEROSOL, METERED ORAL at 20:29

## 2017-12-10 RX ADMIN — Medication 100 MILLIGRAM(S): at 11:15

## 2017-12-10 RX ADMIN — CHLORHEXIDINE GLUCONATE 15 MILLILITER(S): 213 SOLUTION TOPICAL at 17:32

## 2017-12-10 RX ADMIN — Medication 20 MILLIGRAM(S): at 17:32

## 2017-12-10 RX ADMIN — Medication 100 MILLIGRAM(S): at 21:30

## 2017-12-10 RX ADMIN — Medication 20 MILLIGRAM(S): at 05:38

## 2017-12-11 LAB
24R-OH-CALCIDIOL SERPL-MCNC: 38.8 NG/ML — SIGNIFICANT CHANGE UP (ref 30–80)
24R-OH-CALCIDIOL SERPL-MCNC: 39 NG/ML — SIGNIFICANT CHANGE UP (ref 30–80)
ANION GAP SERPL CALC-SCNC: 13 MMOL/L — SIGNIFICANT CHANGE UP (ref 5–17)
BLD GP AB SCN SERPL QL: SIGNIFICANT CHANGE UP
BUN SERPL-MCNC: 46 MG/DL — HIGH (ref 8–20)
CALCIUM SERPL-MCNC: 8.9 MG/DL — SIGNIFICANT CHANGE UP (ref 8.6–10.2)
CHLORIDE SERPL-SCNC: 103 MMOL/L — SIGNIFICANT CHANGE UP (ref 98–107)
CO2 SERPL-SCNC: 28 MMOL/L — SIGNIFICANT CHANGE UP (ref 22–29)
CREAT SERPL-MCNC: 1.94 MG/DL — HIGH (ref 0.5–1.3)
GLUCOSE BLDC GLUCOMTR-MCNC: 105 MG/DL — HIGH (ref 70–99)
GLUCOSE BLDC GLUCOMTR-MCNC: 141 MG/DL — HIGH (ref 70–99)
GLUCOSE BLDC GLUCOMTR-MCNC: 151 MG/DL — HIGH (ref 70–99)
GLUCOSE BLDC GLUCOMTR-MCNC: 207 MG/DL — HIGH (ref 70–99)
GLUCOSE SERPL-MCNC: 136 MG/DL — HIGH (ref 70–115)
INR BLD: 2.57 RATIO — HIGH (ref 0.88–1.16)
POTASSIUM SERPL-MCNC: 4.6 MMOL/L — SIGNIFICANT CHANGE UP (ref 3.5–5.3)
POTASSIUM SERPL-SCNC: 4.6 MMOL/L — SIGNIFICANT CHANGE UP (ref 3.5–5.3)
PROTHROM AB SERPL-ACNC: 28.8 SEC — HIGH (ref 9.8–12.7)
SODIUM SERPL-SCNC: 144 MMOL/L — SIGNIFICANT CHANGE UP (ref 135–145)
TYPE + AB SCN PNL BLD: SIGNIFICANT CHANGE UP

## 2017-12-11 PROCEDURE — 71010: CPT | Mod: 26

## 2017-12-11 PROCEDURE — 99233 SBSQ HOSP IP/OBS HIGH 50: CPT

## 2017-12-11 RX ORDER — ALBUTEROL 90 UG/1
1 AEROSOL, METERED ORAL EVERY 4 HOURS
Qty: 0 | Refills: 0 | Status: DISCONTINUED | OUTPATIENT
Start: 2017-12-11 | End: 2017-12-13

## 2017-12-11 RX ORDER — PHYTONADIONE (VIT K1) 5 MG
5 TABLET ORAL ONCE
Qty: 0 | Refills: 0 | Status: COMPLETED | OUTPATIENT
Start: 2017-12-11 | End: 2017-12-11

## 2017-12-11 RX ORDER — ALBUTEROL 90 UG/1
2.5 AEROSOL, METERED ORAL EVERY 6 HOURS
Qty: 0 | Refills: 0 | Status: DISCONTINUED | OUTPATIENT
Start: 2017-12-11 | End: 2017-12-13

## 2017-12-11 RX ADMIN — BUDESONIDE AND FORMOTEROL FUMARATE DIHYDRATE 2 PUFF(S): 160; 4.5 AEROSOL RESPIRATORY (INHALATION) at 08:50

## 2017-12-11 RX ADMIN — Medication 100 MILLIGRAM(S): at 21:48

## 2017-12-11 RX ADMIN — SPIRONOLACTONE 25 MILLIGRAM(S): 25 TABLET, FILM COATED ORAL at 06:32

## 2017-12-11 RX ADMIN — Medication 100 MILLIGRAM(S): at 13:08

## 2017-12-11 RX ADMIN — LISINOPRIL 5 MILLIGRAM(S): 2.5 TABLET ORAL at 06:33

## 2017-12-11 RX ADMIN — CHLORHEXIDINE GLUCONATE 15 MILLILITER(S): 213 SOLUTION TOPICAL at 06:31

## 2017-12-11 RX ADMIN — CARVEDILOL PHOSPHATE 6.25 MILLIGRAM(S): 80 CAPSULE, EXTENDED RELEASE ORAL at 17:46

## 2017-12-11 RX ADMIN — CHLORHEXIDINE GLUCONATE 15 MILLILITER(S): 213 SOLUTION TOPICAL at 17:46

## 2017-12-11 RX ADMIN — MUPIROCIN 1 APPLICATION(S): 20 OINTMENT TOPICAL at 17:45

## 2017-12-11 RX ADMIN — Medication 20 MILLIGRAM(S): at 17:46

## 2017-12-11 RX ADMIN — Medication 20 MILLIGRAM(S): at 06:32

## 2017-12-11 RX ADMIN — Medication 81 MILLIGRAM(S): at 13:08

## 2017-12-11 RX ADMIN — MUPIROCIN 1 APPLICATION(S): 20 OINTMENT TOPICAL at 21:48

## 2017-12-11 RX ADMIN — AMIODARONE HYDROCHLORIDE 200 MILLIGRAM(S): 400 TABLET ORAL at 06:31

## 2017-12-11 RX ADMIN — CHLORHEXIDINE GLUCONATE 1 APPLICATION(S): 213 SOLUTION TOPICAL at 12:04

## 2017-12-11 RX ADMIN — Medication 5 MILLIGRAM(S): at 13:08

## 2017-12-11 RX ADMIN — ATORVASTATIN CALCIUM 80 MILLIGRAM(S): 80 TABLET, FILM COATED ORAL at 21:48

## 2017-12-11 RX ADMIN — BUDESONIDE AND FORMOTEROL FUMARATE DIHYDRATE 2 PUFF(S): 160; 4.5 AEROSOL RESPIRATORY (INHALATION) at 19:49

## 2017-12-11 RX ADMIN — ALBUTEROL 2.5 MILLIGRAM(S): 90 AEROSOL, METERED ORAL at 03:57

## 2017-12-11 RX ADMIN — CHLORHEXIDINE GLUCONATE 1 APPLICATION(S): 213 SOLUTION TOPICAL at 21:47

## 2017-12-11 RX ADMIN — SODIUM CHLORIDE 3 MILLILITER(S): 9 INJECTION INTRAMUSCULAR; INTRAVENOUS; SUBCUTANEOUS at 07:24

## 2017-12-11 RX ADMIN — Medication 100 MILLIGRAM(S): at 06:32

## 2017-12-11 RX ADMIN — ALBUTEROL 2.5 MILLIGRAM(S): 90 AEROSOL, METERED ORAL at 08:46

## 2017-12-11 RX ADMIN — SODIUM CHLORIDE 3 MILLILITER(S): 9 INJECTION INTRAMUSCULAR; INTRAVENOUS; SUBCUTANEOUS at 13:06

## 2017-12-11 RX ADMIN — Medication 2: at 17:46

## 2017-12-11 RX ADMIN — SODIUM CHLORIDE 3 MILLILITER(S): 9 INJECTION INTRAMUSCULAR; INTRAVENOUS; SUBCUTANEOUS at 21:48

## 2017-12-11 RX ADMIN — Medication 4: at 13:09

## 2017-12-11 RX ADMIN — CARVEDILOL PHOSPHATE 6.25 MILLIGRAM(S): 80 CAPSULE, EXTENDED RELEASE ORAL at 06:32

## 2017-12-11 NOTE — PROGRESS NOTE ADULT - ASSESSMENT
Patient with mild obstructive impairment.  Restriction in vital capacity likely a technical issue.  Exam reveals good breath sounds without evidence of wheeze nor suggestion of prolonged I/E.  However currently receiving albuterol AND Symbicort>not appropriate.  Albuterol should be given PRN with continued use of routine MDI.     Plan:  Per cardiac surgery  Change albuterol to PRN  Little else to add at this point

## 2017-12-11 NOTE — PROGRESS NOTE ADULT - ASSESSMENT
77yMale COPD, acute on chronic heart failure, + 3vd, + MR, admitted for acute on chronic chf exceerbation.     preop CABG on 12/13.

## 2017-12-12 LAB
ALBUMIN SERPL ELPH-MCNC: 3.8 G/DL — SIGNIFICANT CHANGE UP (ref 3.3–5.2)
ALP SERPL-CCNC: 88 U/L — SIGNIFICANT CHANGE UP (ref 40–120)
ALT FLD-CCNC: 26 U/L — SIGNIFICANT CHANGE UP
ANION GAP SERPL CALC-SCNC: 14 MMOL/L — SIGNIFICANT CHANGE UP (ref 5–17)
AST SERPL-CCNC: 28 U/L — SIGNIFICANT CHANGE UP
BILIRUB SERPL-MCNC: 1.9 MG/DL — SIGNIFICANT CHANGE UP (ref 0.4–2)
BLD GP AB SCN SERPL QL: SIGNIFICANT CHANGE UP
BUN SERPL-MCNC: 47 MG/DL — HIGH (ref 8–20)
CALCIUM SERPL-MCNC: 8.8 MG/DL — SIGNIFICANT CHANGE UP (ref 8.6–10.2)
CALCIUM SERPL-MCNC: 9.1 MG/DL — SIGNIFICANT CHANGE UP (ref 8.4–10.5)
CHLORIDE SERPL-SCNC: 102 MMOL/L — SIGNIFICANT CHANGE UP (ref 98–107)
CO2 SERPL-SCNC: 26 MMOL/L — SIGNIFICANT CHANGE UP (ref 22–29)
CREAT SERPL-MCNC: 1.93 MG/DL — HIGH (ref 0.5–1.3)
GLUCOSE BLDC GLUCOMTR-MCNC: 117 MG/DL — HIGH (ref 70–99)
GLUCOSE BLDC GLUCOMTR-MCNC: 154 MG/DL — HIGH (ref 70–99)
GLUCOSE BLDC GLUCOMTR-MCNC: 158 MG/DL — HIGH (ref 70–99)
GLUCOSE BLDC GLUCOMTR-MCNC: 197 MG/DL — HIGH (ref 70–99)
GLUCOSE SERPL-MCNC: 112 MG/DL — SIGNIFICANT CHANGE UP (ref 70–115)
HCT VFR BLD CALC: 46.3 % — SIGNIFICANT CHANGE UP (ref 42–52)
HGB BLD-MCNC: 15.5 G/DL — SIGNIFICANT CHANGE UP (ref 14–18)
INR BLD: 1.79 RATIO — HIGH (ref 0.88–1.16)
MCHC RBC-ENTMCNC: 31.3 PG — HIGH (ref 27–31)
MCHC RBC-ENTMCNC: 33.5 G/DL — SIGNIFICANT CHANGE UP (ref 32–36)
MCV RBC AUTO: 93.5 FL — SIGNIFICANT CHANGE UP (ref 80–94)
PLATELET # BLD AUTO: 123 K/UL — LOW (ref 150–400)
POTASSIUM SERPL-MCNC: 4.4 MMOL/L — SIGNIFICANT CHANGE UP (ref 3.5–5.3)
POTASSIUM SERPL-SCNC: 4.4 MMOL/L — SIGNIFICANT CHANGE UP (ref 3.5–5.3)
PROT SERPL-MCNC: 7 G/DL — SIGNIFICANT CHANGE UP (ref 6.6–8.7)
PROTHROM AB SERPL-ACNC: 19.9 SEC — HIGH (ref 9.8–12.7)
PTH-INTACT FLD-MCNC: 70 PG/ML — HIGH (ref 15–65)
RBC # BLD: 4.95 M/UL — SIGNIFICANT CHANGE UP (ref 4.6–6.2)
RBC # FLD: 16 % — HIGH (ref 11–15.6)
SODIUM SERPL-SCNC: 142 MMOL/L — SIGNIFICANT CHANGE UP (ref 135–145)
TYPE + AB SCN PNL BLD: SIGNIFICANT CHANGE UP
WBC # BLD: 5.7 K/UL — SIGNIFICANT CHANGE UP (ref 4.8–10.8)
WBC # FLD AUTO: 5.7 K/UL — SIGNIFICANT CHANGE UP (ref 4.8–10.8)

## 2017-12-12 PROCEDURE — 99233 SBSQ HOSP IP/OBS HIGH 50: CPT

## 2017-12-12 RX ORDER — PHYTONADIONE (VIT K1) 5 MG
5 TABLET ORAL ONCE
Qty: 0 | Refills: 0 | Status: DISCONTINUED | OUTPATIENT
Start: 2017-12-12 | End: 2017-12-12

## 2017-12-12 RX ORDER — PHYTONADIONE (VIT K1) 5 MG
10 TABLET ORAL ONCE
Qty: 0 | Refills: 0 | Status: COMPLETED | OUTPATIENT
Start: 2017-12-12 | End: 2017-12-12

## 2017-12-12 RX ORDER — VANCOMYCIN HCL 1 G
1000 VIAL (EA) INTRAVENOUS ONCE
Qty: 0 | Refills: 0 | Status: DISCONTINUED | OUTPATIENT
Start: 2017-12-13 | End: 2017-12-13

## 2017-12-12 RX ADMIN — CHLORHEXIDINE GLUCONATE 15 MILLILITER(S): 213 SOLUTION TOPICAL at 05:53

## 2017-12-12 RX ADMIN — SODIUM CHLORIDE 3 MILLILITER(S): 9 INJECTION INTRAMUSCULAR; INTRAVENOUS; SUBCUTANEOUS at 12:04

## 2017-12-12 RX ADMIN — Medication 20 MILLIGRAM(S): at 17:17

## 2017-12-12 RX ADMIN — Medication 2: at 08:33

## 2017-12-12 RX ADMIN — Medication 81 MILLIGRAM(S): at 12:05

## 2017-12-12 RX ADMIN — BUDESONIDE AND FORMOTEROL FUMARATE DIHYDRATE 2 PUFF(S): 160; 4.5 AEROSOL RESPIRATORY (INHALATION) at 09:30

## 2017-12-12 RX ADMIN — Medication 100 MILLIGRAM(S): at 05:54

## 2017-12-12 RX ADMIN — Medication 100 MILLIGRAM(S): at 12:05

## 2017-12-12 RX ADMIN — Medication 20 MILLIGRAM(S): at 05:54

## 2017-12-12 RX ADMIN — SODIUM CHLORIDE 3 MILLILITER(S): 9 INJECTION INTRAMUSCULAR; INTRAVENOUS; SUBCUTANEOUS at 05:50

## 2017-12-12 RX ADMIN — AMIODARONE HYDROCHLORIDE 200 MILLIGRAM(S): 400 TABLET ORAL at 05:53

## 2017-12-12 RX ADMIN — CHLORHEXIDINE GLUCONATE 1 APPLICATION(S): 213 SOLUTION TOPICAL at 18:48

## 2017-12-12 RX ADMIN — MUPIROCIN 1 APPLICATION(S): 20 OINTMENT TOPICAL at 05:53

## 2017-12-12 RX ADMIN — CHLORHEXIDINE GLUCONATE 15 MILLILITER(S): 213 SOLUTION TOPICAL at 17:16

## 2017-12-12 RX ADMIN — Medication 10 MILLIGRAM(S): at 20:13

## 2017-12-12 RX ADMIN — Medication 100 MILLIGRAM(S): at 21:36

## 2017-12-12 RX ADMIN — CHLORHEXIDINE GLUCONATE 1 APPLICATION(S): 213 SOLUTION TOPICAL at 10:03

## 2017-12-12 RX ADMIN — MUPIROCIN 1 APPLICATION(S): 20 OINTMENT TOPICAL at 17:16

## 2017-12-12 RX ADMIN — SPIRONOLACTONE 25 MILLIGRAM(S): 25 TABLET, FILM COATED ORAL at 08:33

## 2017-12-12 RX ADMIN — BUDESONIDE AND FORMOTEROL FUMARATE DIHYDRATE 2 PUFF(S): 160; 4.5 AEROSOL RESPIRATORY (INHALATION) at 20:04

## 2017-12-12 RX ADMIN — TIOTROPIUM BROMIDE 1 CAPSULE(S): 18 CAPSULE ORAL; RESPIRATORY (INHALATION) at 09:30

## 2017-12-12 RX ADMIN — SODIUM CHLORIDE 3 MILLILITER(S): 9 INJECTION INTRAMUSCULAR; INTRAVENOUS; SUBCUTANEOUS at 21:32

## 2017-12-12 RX ADMIN — ATORVASTATIN CALCIUM 80 MILLIGRAM(S): 80 TABLET, FILM COATED ORAL at 21:36

## 2017-12-12 RX ADMIN — Medication 2: at 12:05

## 2017-12-12 NOTE — PROGRESS NOTE ADULT - ASSESSMENT
1.Cardio Renal Syndrome     2.Hypoperfused Kidneys    3.CAD (prior MI)    On Diuretics & Currently Decongested    On Low dose ACEi & Aldactone    Continue current management plan    CABG tomorrow 12/13/17

## 2017-12-13 ENCOUNTER — APPOINTMENT (OUTPATIENT)
Dept: CARDIOTHORACIC SURGERY | Facility: HOSPITAL | Age: 77
End: 2017-12-13
Payer: MEDICARE

## 2017-12-13 ENCOUNTER — RESULT REVIEW (OUTPATIENT)
Age: 77
End: 2017-12-13

## 2017-12-13 LAB
ALBUMIN SERPL ELPH-MCNC: 3.6 G/DL — SIGNIFICANT CHANGE UP (ref 3.3–5.2)
ALP SERPL-CCNC: 65 U/L — SIGNIFICANT CHANGE UP (ref 40–120)
ALT FLD-CCNC: 23 U/L — SIGNIFICANT CHANGE UP
ANION GAP SERPL CALC-SCNC: 13 MMOL/L — SIGNIFICANT CHANGE UP (ref 5–17)
ANION GAP SERPL CALC-SCNC: 15 MMOL/L — SIGNIFICANT CHANGE UP (ref 5–17)
APTT BLD: 34.7 SEC — SIGNIFICANT CHANGE UP (ref 27.5–37.4)
AST SERPL-CCNC: 47 U/L — HIGH
BASE EXCESS BLDV CALC-SCNC: -2 MMOL/L — SIGNIFICANT CHANGE UP (ref -2–2)
BASE EXCESS BLDV CALC-SCNC: -2.6 MMOL/L — LOW (ref -2–2)
BILIRUB SERPL-MCNC: 2.7 MG/DL — HIGH (ref 0.4–2)
BLOOD GAS COMMENTS, VENOUS: SIGNIFICANT CHANGE UP
BLOOD GAS COMMENTS, VENOUS: SIGNIFICANT CHANGE UP
BUN SERPL-MCNC: 34 MG/DL — HIGH (ref 8–20)
BUN SERPL-MCNC: 45 MG/DL — HIGH (ref 8–20)
CA-I SERPL-SCNC: 1.04 MMOL/L — LOW (ref 1.15–1.33)
CA-I SERPL-SCNC: 1.13 MMOL/L — LOW (ref 1.15–1.33)
CALCIUM SERPL-MCNC: 8.5 MG/DL — LOW (ref 8.6–10.2)
CALCIUM SERPL-MCNC: 9.2 MG/DL — SIGNIFICANT CHANGE UP (ref 8.6–10.2)
CHLORIDE BLDV-SCNC: 107 MMOL/L — SIGNIFICANT CHANGE UP (ref 98–107)
CHLORIDE BLDV-SCNC: 110 MMOL/L — HIGH (ref 98–107)
CHLORIDE SERPL-SCNC: 107 MMOL/L — SIGNIFICANT CHANGE UP (ref 98–107)
CHLORIDE SERPL-SCNC: 98 MMOL/L — SIGNIFICANT CHANGE UP (ref 98–107)
CK MB CFR SERPL CALC: 26 NG/ML — HIGH (ref 0–6.7)
CK SERPL-CCNC: 353 U/L — HIGH (ref 30–200)
CO2 SERPL-SCNC: 23 MMOL/L — SIGNIFICANT CHANGE UP (ref 22–29)
CO2 SERPL-SCNC: 26 MMOL/L — SIGNIFICANT CHANGE UP (ref 22–29)
CREAT SERPL-MCNC: 1.35 MG/DL — HIGH (ref 0.5–1.3)
CREAT SERPL-MCNC: 1.79 MG/DL — HIGH (ref 0.5–1.3)
GAS PNL BLDA: SIGNIFICANT CHANGE UP
GAS PNL BLDA: SIGNIFICANT CHANGE UP
GAS PNL BLDV: 141 MMOL/L — SIGNIFICANT CHANGE UP (ref 135–145)
GAS PNL BLDV: 143 MMOL/L — SIGNIFICANT CHANGE UP (ref 135–145)
GAS PNL BLDV: SIGNIFICANT CHANGE UP
GLUCOSE BLDC GLUCOMTR-MCNC: 141 MG/DL — HIGH (ref 70–99)
GLUCOSE BLDC GLUCOMTR-MCNC: 163 MG/DL — HIGH (ref 70–99)
GLUCOSE BLDC GLUCOMTR-MCNC: 171 MG/DL — HIGH (ref 70–99)
GLUCOSE BLDC GLUCOMTR-MCNC: 172 MG/DL — HIGH (ref 70–99)
GLUCOSE BLDC GLUCOMTR-MCNC: 174 MG/DL — HIGH (ref 70–99)
GLUCOSE BLDC GLUCOMTR-MCNC: 176 MG/DL — HIGH (ref 70–99)
GLUCOSE BLDC GLUCOMTR-MCNC: 178 MG/DL — HIGH (ref 70–99)
GLUCOSE BLDV-MCNC: 155 MG/DL — HIGH (ref 70–99)
GLUCOSE BLDV-MCNC: 158 MG/DL — HIGH (ref 70–99)
GLUCOSE SERPL-MCNC: 128 MG/DL — HIGH (ref 70–115)
GLUCOSE SERPL-MCNC: 166 MG/DL — HIGH (ref 70–115)
HCO3 BLDV-SCNC: 22 MMOL/L — SIGNIFICANT CHANGE UP (ref 21–29)
HCO3 BLDV-SCNC: 22 MMOL/L — SIGNIFICANT CHANGE UP (ref 21–29)
HCT VFR BLD CALC: 36.1 % — LOW (ref 42–52)
HCT VFR BLD CALC: 47.5 % — SIGNIFICANT CHANGE UP (ref 42–52)
HCT VFR BLDA CALC: 32 — LOW (ref 39–50)
HCT VFR BLDA CALC: 38 — LOW (ref 39–50)
HGB BLD CALC-MCNC: 10.3 G/DL — LOW (ref 13–17)
HGB BLD CALC-MCNC: 12.5 G/DL — LOW (ref 13–17)
HGB BLD-MCNC: 12 G/DL — LOW (ref 14–18)
HGB BLD-MCNC: 16.5 G/DL — SIGNIFICANT CHANGE UP (ref 14–18)
HOROWITZ INDEX BLDV+IHG-RTO: SIGNIFICANT CHANGE UP
HOROWITZ INDEX BLDV+IHG-RTO: SIGNIFICANT CHANGE UP
INR BLD: 1.28 RATIO — HIGH (ref 0.88–1.16)
INR BLD: 1.33 RATIO — HIGH (ref 0.88–1.16)
LACTATE BLDV-MCNC: 1.4 MMOL/L — SIGNIFICANT CHANGE UP (ref 0.5–2)
LACTATE BLDV-MCNC: 1.7 MMOL/L — SIGNIFICANT CHANGE UP (ref 0.5–2)
MAGNESIUM SERPL-MCNC: 2.7 MG/DL — HIGH (ref 1.6–2.6)
MCHC RBC-ENTMCNC: 31 PG — SIGNIFICANT CHANGE UP (ref 27–31)
MCHC RBC-ENTMCNC: 32.3 PG — HIGH (ref 27–31)
MCHC RBC-ENTMCNC: 33.2 G/DL — SIGNIFICANT CHANGE UP (ref 32–36)
MCHC RBC-ENTMCNC: 34.7 G/DL — SIGNIFICANT CHANGE UP (ref 32–36)
MCV RBC AUTO: 93 FL — SIGNIFICANT CHANGE UP (ref 80–94)
MCV RBC AUTO: 93.3 FL — SIGNIFICANT CHANGE UP (ref 80–94)
OTHER CELLS CSF MANUAL: 10 ML/DL — LOW (ref 18–22)
OTHER CELLS CSF MANUAL: 12 ML/DL — LOW (ref 18–22)
PCO2 BLDV: 44 MMHG — SIGNIFICANT CHANGE UP (ref 35–50)
PCO2 BLDV: 52 MMHG — HIGH (ref 35–50)
PH BLDV: 7.29 — LOW (ref 7.32–7.43)
PH BLDV: 7.33 — SIGNIFICANT CHANGE UP (ref 7.32–7.43)
PHOSPHATE SERPL-MCNC: 2.1 MG/DL — LOW (ref 2.4–4.7)
PLATELET # BLD AUTO: 129 K/UL — LOW (ref 150–400)
PLATELET # BLD AUTO: 185 K/UL — SIGNIFICANT CHANGE UP (ref 150–400)
PO2 BLDV: 40 MMHG — SIGNIFICANT CHANGE UP (ref 25–45)
PO2 BLDV: 45 MMHG — SIGNIFICANT CHANGE UP (ref 25–45)
POTASSIUM BLDV-SCNC: 4.1 MMOL/L — SIGNIFICANT CHANGE UP (ref 3.4–4.5)
POTASSIUM BLDV-SCNC: 4.1 MMOL/L — SIGNIFICANT CHANGE UP (ref 3.4–4.5)
POTASSIUM SERPL-MCNC: 4.4 MMOL/L — SIGNIFICANT CHANGE UP (ref 3.5–5.3)
POTASSIUM SERPL-MCNC: 4.6 MMOL/L — SIGNIFICANT CHANGE UP (ref 3.5–5.3)
POTASSIUM SERPL-SCNC: 4.4 MMOL/L — SIGNIFICANT CHANGE UP (ref 3.5–5.3)
POTASSIUM SERPL-SCNC: 4.6 MMOL/L — SIGNIFICANT CHANGE UP (ref 3.5–5.3)
PROT SERPL-MCNC: 6 G/DL — LOW (ref 6.6–8.7)
PROTHROM AB SERPL-ACNC: 14.1 SEC — HIGH (ref 9.8–12.7)
PROTHROM AB SERPL-ACNC: 14.7 SEC — HIGH (ref 9.8–12.7)
RBC # BLD: 3.87 M/UL — LOW (ref 4.6–6.2)
RBC # BLD: 5.11 M/UL — SIGNIFICANT CHANGE UP (ref 4.6–6.2)
RBC # FLD: 15.9 % — HIGH (ref 11–15.6)
RBC # FLD: 16 % — HIGH (ref 11–15.6)
SAO2 % BLDV: 70 % — SIGNIFICANT CHANGE UP
SAO2 % BLDV: 73 % — SIGNIFICANT CHANGE UP
SODIUM SERPL-SCNC: 139 MMOL/L — SIGNIFICANT CHANGE UP (ref 135–145)
SODIUM SERPL-SCNC: 143 MMOL/L — SIGNIFICANT CHANGE UP (ref 135–145)
TROPONIN T SERPL-MCNC: 0.82 NG/ML — HIGH (ref 0–0.06)
WBC # BLD: 17.3 K/UL — HIGH (ref 4.8–10.8)
WBC # BLD: 5.9 K/UL — SIGNIFICANT CHANGE UP (ref 4.8–10.8)
WBC # FLD AUTO: 17.3 K/UL — HIGH (ref 4.8–10.8)
WBC # FLD AUTO: 5.9 K/UL — SIGNIFICANT CHANGE UP (ref 4.8–10.8)

## 2017-12-13 PROCEDURE — 33533 CABG ARTERIAL SINGLE: CPT

## 2017-12-13 PROCEDURE — 33405 REPLACEMENT AORTIC VALVE OPN: CPT | Mod: AS

## 2017-12-13 PROCEDURE — 33430 REPLACEMENT OF MITRAL VALVE: CPT

## 2017-12-13 PROCEDURE — 33430 REPLACEMENT OF MITRAL VALVE: CPT | Mod: AS

## 2017-12-13 PROCEDURE — 33405 REPLACEMENT AORTIC VALVE OPN: CPT

## 2017-12-13 PROCEDURE — 71010: CPT | Mod: 26,77

## 2017-12-13 PROCEDURE — 88305 TISSUE EXAM BY PATHOLOGIST: CPT | Mod: 26

## 2017-12-13 PROCEDURE — 33518 CABG ARTERY-VEIN TWO: CPT | Mod: AS

## 2017-12-13 PROCEDURE — 71010: CPT | Mod: 26

## 2017-12-13 PROCEDURE — 33518 CABG ARTERY-VEIN TWO: CPT

## 2017-12-13 PROCEDURE — 33533 CABG ARTERIAL SINGLE: CPT | Mod: AS

## 2017-12-13 RX ORDER — AMIODARONE HYDROCHLORIDE 400 MG/1
200 TABLET ORAL DAILY
Qty: 0 | Refills: 0 | Status: DISCONTINUED | OUTPATIENT
Start: 2017-12-13 | End: 2017-12-20

## 2017-12-13 RX ORDER — SODIUM CHLORIDE 9 MG/ML
1000 INJECTION INTRAMUSCULAR; INTRAVENOUS; SUBCUTANEOUS
Qty: 0 | Refills: 0 | Status: DISCONTINUED | OUTPATIENT
Start: 2017-12-13 | End: 2017-12-17

## 2017-12-13 RX ORDER — ASPIRIN/CALCIUM CARB/MAGNESIUM 324 MG
325 TABLET ORAL ONCE
Qty: 0 | Refills: 0 | Status: DISCONTINUED | OUTPATIENT
Start: 2017-12-13 | End: 2017-12-13

## 2017-12-13 RX ORDER — PANTOPRAZOLE SODIUM 20 MG/1
40 TABLET, DELAYED RELEASE ORAL
Qty: 0 | Refills: 0 | Status: DISCONTINUED | OUTPATIENT
Start: 2017-12-13 | End: 2017-12-20

## 2017-12-13 RX ORDER — ALBUMIN HUMAN 25 %
250 VIAL (ML) INTRAVENOUS
Qty: 0 | Refills: 0 | Status: COMPLETED | OUTPATIENT
Start: 2017-12-13 | End: 2017-12-13

## 2017-12-13 RX ORDER — CLOPIDOGREL BISULFATE 75 MG/1
75 TABLET, FILM COATED ORAL DAILY
Qty: 0 | Refills: 0 | Status: DISCONTINUED | OUTPATIENT
Start: 2017-12-14 | End: 2017-12-15

## 2017-12-13 RX ORDER — ATORVASTATIN CALCIUM 80 MG/1
80 TABLET, FILM COATED ORAL AT BEDTIME
Qty: 0 | Refills: 0 | Status: DISCONTINUED | OUTPATIENT
Start: 2017-12-13 | End: 2017-12-20

## 2017-12-13 RX ORDER — CEFUROXIME AXETIL 250 MG
1500 TABLET ORAL EVERY 8 HOURS
Qty: 0 | Refills: 0 | Status: COMPLETED | OUTPATIENT
Start: 2017-12-13 | End: 2017-12-15

## 2017-12-13 RX ORDER — VANCOMYCIN HCL 1 G
1000 VIAL (EA) INTRAVENOUS EVERY 12 HOURS
Qty: 0 | Refills: 0 | Status: COMPLETED | OUTPATIENT
Start: 2017-12-13 | End: 2017-12-15

## 2017-12-13 RX ORDER — NICARDIPINE HYDROCHLORIDE 30 MG/1
5 CAPSULE, EXTENDED RELEASE ORAL
Qty: 50 | Refills: 0 | Status: DISCONTINUED | OUTPATIENT
Start: 2017-12-13 | End: 2017-12-14

## 2017-12-13 RX ORDER — DOCUSATE SODIUM 100 MG
100 CAPSULE ORAL THREE TIMES A DAY
Qty: 0 | Refills: 0 | Status: DISCONTINUED | OUTPATIENT
Start: 2017-12-13 | End: 2017-12-20

## 2017-12-13 RX ORDER — ASPIRIN/CALCIUM CARB/MAGNESIUM 324 MG
325 TABLET ORAL DAILY
Qty: 0 | Refills: 0 | Status: DISCONTINUED | OUTPATIENT
Start: 2017-12-14 | End: 2017-12-20

## 2017-12-13 RX ORDER — FENTANYL CITRATE 50 UG/ML
50 INJECTION INTRAVENOUS
Qty: 0 | Refills: 0 | Status: DISCONTINUED | OUTPATIENT
Start: 2017-12-13 | End: 2017-12-14

## 2017-12-13 RX ORDER — SODIUM CHLORIDE 9 MG/ML
500 INJECTION, SOLUTION INTRAVENOUS ONCE
Qty: 0 | Refills: 0 | Status: COMPLETED | OUTPATIENT
Start: 2017-12-13 | End: 2017-12-13

## 2017-12-13 RX ORDER — DOBUTAMINE HCL 250MG/20ML
2 VIAL (ML) INTRAVENOUS
Qty: 500 | Refills: 0 | Status: DISCONTINUED | OUTPATIENT
Start: 2017-12-13 | End: 2017-12-15

## 2017-12-13 RX ORDER — POTASSIUM CHLORIDE 20 MEQ
10 PACKET (EA) ORAL
Qty: 0 | Refills: 0 | Status: COMPLETED | OUTPATIENT
Start: 2017-12-13 | End: 2017-12-14

## 2017-12-13 RX ORDER — ACETAMINOPHEN 500 MG
1000 TABLET ORAL ONCE
Qty: 0 | Refills: 0 | Status: COMPLETED | OUTPATIENT
Start: 2017-12-13 | End: 2017-12-15

## 2017-12-13 RX ORDER — EPINEPHRINE 0.3 MG/.3ML
0.03 INJECTION INTRAMUSCULAR; SUBCUTANEOUS
Qty: 4 | Refills: 0 | Status: DISCONTINUED | OUTPATIENT
Start: 2017-12-13 | End: 2017-12-13

## 2017-12-13 RX ORDER — VASOPRESSIN 20 [USP'U]/ML
0.03 INJECTION INTRAVENOUS
Qty: 100 | Refills: 0 | Status: DISCONTINUED | OUTPATIENT
Start: 2017-12-13 | End: 2017-12-13

## 2017-12-13 RX ORDER — DEXTROSE 50 % IN WATER 50 %
25 SYRINGE (ML) INTRAVENOUS
Qty: 0 | Refills: 0 | Status: DISCONTINUED | OUTPATIENT
Start: 2017-12-13 | End: 2017-12-15

## 2017-12-13 RX ORDER — PROPOFOL 10 MG/ML
10 INJECTION, EMULSION INTRAVENOUS
Qty: 1000 | Refills: 0 | Status: DISCONTINUED | OUTPATIENT
Start: 2017-12-13 | End: 2017-12-14

## 2017-12-13 RX ORDER — MEPERIDINE HYDROCHLORIDE 50 MG/ML
25 INJECTION INTRAMUSCULAR; INTRAVENOUS; SUBCUTANEOUS ONCE
Qty: 0 | Refills: 0 | Status: DISCONTINUED | OUTPATIENT
Start: 2017-12-13 | End: 2017-12-13

## 2017-12-13 RX ORDER — INSULIN HUMAN 100 [IU]/ML
2 INJECTION, SOLUTION SUBCUTANEOUS
Qty: 100 | Refills: 0 | Status: DISCONTINUED | OUTPATIENT
Start: 2017-12-13 | End: 2017-12-14

## 2017-12-13 RX ORDER — CLOPIDOGREL BISULFATE 75 MG/1
75 TABLET, FILM COATED ORAL DAILY
Qty: 0 | Refills: 0 | Status: DISCONTINUED | OUTPATIENT
Start: 2017-12-13 | End: 2017-12-13

## 2017-12-13 RX ORDER — NOREPINEPHRINE BITARTRATE/D5W 8 MG/250ML
0.06 PLASTIC BAG, INJECTION (ML) INTRAVENOUS
Qty: 8 | Refills: 0 | Status: DISCONTINUED | OUTPATIENT
Start: 2017-12-13 | End: 2017-12-14

## 2017-12-13 RX ORDER — IPRATROPIUM/ALBUTEROL SULFATE 18-103MCG
3 AEROSOL WITH ADAPTER (GRAM) INHALATION EVERY 6 HOURS
Qty: 0 | Refills: 0 | Status: DISCONTINUED | OUTPATIENT
Start: 2017-12-13 | End: 2017-12-17

## 2017-12-13 RX ORDER — DEXTROSE 50 % IN WATER 50 %
50 SYRINGE (ML) INTRAVENOUS
Qty: 0 | Refills: 0 | Status: DISCONTINUED | OUTPATIENT
Start: 2017-12-13 | End: 2017-12-15

## 2017-12-13 RX ADMIN — Medication 100 MILLIGRAM(S): at 05:06

## 2017-12-13 RX ADMIN — Medication 3 MILLILITER(S): at 21:28

## 2017-12-13 RX ADMIN — MUPIROCIN 1 APPLICATION(S): 20 OINTMENT TOPICAL at 05:06

## 2017-12-13 RX ADMIN — Medication 20 MILLIGRAM(S): at 05:06

## 2017-12-13 RX ADMIN — Medication 50 MILLIEQUIVALENT(S): at 23:15

## 2017-12-13 RX ADMIN — CHLORHEXIDINE GLUCONATE 15 MILLILITER(S): 213 SOLUTION TOPICAL at 05:06

## 2017-12-13 RX ADMIN — CARVEDILOL PHOSPHATE 6.25 MILLIGRAM(S): 80 CAPSULE, EXTENDED RELEASE ORAL at 05:06

## 2017-12-13 RX ADMIN — SODIUM CHLORIDE 3 MILLILITER(S): 9 INJECTION INTRAMUSCULAR; INTRAVENOUS; SUBCUTANEOUS at 05:06

## 2017-12-13 RX ADMIN — FENTANYL CITRATE 50 MICROGRAM(S): 50 INJECTION INTRAVENOUS at 23:45

## 2017-12-13 RX ADMIN — CHLORHEXIDINE GLUCONATE 1 APPLICATION(S): 213 SOLUTION TOPICAL at 05:06

## 2017-12-13 RX ADMIN — Medication 250 MILLIGRAM(S): at 20:45

## 2017-12-13 RX ADMIN — Medication 50 MILLIEQUIVALENT(S): at 23:56

## 2017-12-13 RX ADMIN — Medication 125 MILLILITER(S): at 21:49

## 2017-12-13 RX ADMIN — FENTANYL CITRATE 50 MICROGRAM(S): 50 INJECTION INTRAVENOUS at 23:00

## 2017-12-13 RX ADMIN — SODIUM CHLORIDE 2000 MILLILITER(S): 9 INJECTION, SOLUTION INTRAVENOUS at 20:45

## 2017-12-13 RX ADMIN — FENTANYL CITRATE 50 MICROGRAM(S): 50 INJECTION INTRAVENOUS at 23:30

## 2017-12-13 RX ADMIN — Medication 100 MILLIGRAM(S): at 18:44

## 2017-12-13 RX ADMIN — Medication 125 MILLILITER(S): at 23:20

## 2017-12-13 RX ADMIN — SPIRONOLACTONE 25 MILLIGRAM(S): 25 TABLET, FILM COATED ORAL at 05:06

## 2017-12-13 RX ADMIN — AMIODARONE HYDROCHLORIDE 200 MILLIGRAM(S): 400 TABLET ORAL at 05:06

## 2017-12-13 NOTE — PRE-OP CHECKLIST - SELECT TESTS ORDERED
CBC/INR/PT/PTT/Type and Screen/POCT Blood Glucose/BMP/EKG/CMP/Type and Cross/Urinalysis/Spirometry/CXR

## 2017-12-13 NOTE — BRIEF OPERATIVE NOTE - PROCEDURE
<<-----Click on this checkbox to enter Procedure Clipping of left atrial appendage  12/13/2017  40mm Clip  Active  [email or web address]  CABG, with aortic and mitral valve replacement  12/13/2017  Coronary artery bypass grafting x3. LIMA-LAD, SVG-OM1, SVG-PDA. Aortic valve replacement using 23mm Bennett pericardial tissue valve. Mitral valve replacement using 29mm Brand II tissue valve.  Active  [email or web address]

## 2017-12-13 NOTE — BRIEF OPERATIVE NOTE - COMMENTS
Right sided greater saphenous vein harvested via EVH. Right sided greater saphenous vein harvested via EVH.  Aortic cross clamp time 143  On Epinephrine and Levophed

## 2017-12-13 NOTE — BRIEF OPERATIVE NOTE - PRE-OP DX
Chronic atrial fibrillation  12/13/2017    Active  Bharat Escamilla  Coronary artery disease of native artery of native heart with stable angina pectoris  12/13/2017    Bharat Ibarra  Non-rheumatic mitral regurgitation  12/13/2017    Bharat Ibarra  Nonrheumatic aortic valve insufficiency  12/13/2017    Active  Bharat Escamilla

## 2017-12-13 NOTE — PROCEDURE NOTE - NSPROCDETAILS_GEN_ALL_CORE
sterile dressing applied/sterile technique, catheter placed/ultrasound guidance/lumen(s) aspirated and flushed/guidewire recovered

## 2017-12-14 DIAGNOSIS — I10 ESSENTIAL (PRIMARY) HYPERTENSION: ICD-10-CM

## 2017-12-14 DIAGNOSIS — I48.91 UNSPECIFIED ATRIAL FIBRILLATION: ICD-10-CM

## 2017-12-14 LAB
ALBUMIN SERPL ELPH-MCNC: 3.7 G/DL — SIGNIFICANT CHANGE UP (ref 3.3–5.2)
ALP SERPL-CCNC: 55 U/L — SIGNIFICANT CHANGE UP (ref 40–120)
ALT FLD-CCNC: 21 U/L — SIGNIFICANT CHANGE UP
ANION GAP SERPL CALC-SCNC: 15 MMOL/L — SIGNIFICANT CHANGE UP (ref 5–17)
APTT BLD: 30 SEC — SIGNIFICANT CHANGE UP (ref 27.5–37.4)
AST SERPL-CCNC: 54 U/L — HIGH
BILIRUB DIRECT SERPL-MCNC: 0.6 MG/DL — HIGH (ref 0–0.3)
BILIRUB INDIRECT FLD-MCNC: 1.8 MG/DL — HIGH (ref 0.2–1)
BILIRUB SERPL-MCNC: 2.4 MG/DL — HIGH (ref 0.4–2)
BUN SERPL-MCNC: 34 MG/DL — HIGH (ref 8–20)
CALCIUM SERPL-MCNC: 8.1 MG/DL — LOW (ref 8.6–10.2)
CHLORIDE SERPL-SCNC: 108 MMOL/L — HIGH (ref 98–107)
CK MB CFR SERPL CALC: 35 NG/ML — HIGH (ref 0–6.7)
CK SERPL-CCNC: 414 U/L — HIGH (ref 30–200)
CO2 SERPL-SCNC: 20 MMOL/L — LOW (ref 22–29)
CREAT SERPL-MCNC: 1.37 MG/DL — HIGH (ref 0.5–1.3)
GAS PNL BLDA: SIGNIFICANT CHANGE UP
GLUCOSE BLDC GLUCOMTR-MCNC: 103 MG/DL — HIGH (ref 70–99)
GLUCOSE BLDC GLUCOMTR-MCNC: 111 MG/DL — HIGH (ref 70–99)
GLUCOSE BLDC GLUCOMTR-MCNC: 127 MG/DL — HIGH (ref 70–99)
GLUCOSE BLDC GLUCOMTR-MCNC: 127 MG/DL — HIGH (ref 70–99)
GLUCOSE BLDC GLUCOMTR-MCNC: 131 MG/DL — HIGH (ref 70–99)
GLUCOSE BLDC GLUCOMTR-MCNC: 135 MG/DL — HIGH (ref 70–99)
GLUCOSE BLDC GLUCOMTR-MCNC: 138 MG/DL — HIGH (ref 70–99)
GLUCOSE BLDC GLUCOMTR-MCNC: 142 MG/DL — HIGH (ref 70–99)
GLUCOSE BLDC GLUCOMTR-MCNC: 146 MG/DL — HIGH (ref 70–99)
GLUCOSE BLDC GLUCOMTR-MCNC: 151 MG/DL — HIGH (ref 70–99)
GLUCOSE BLDC GLUCOMTR-MCNC: 157 MG/DL — HIGH (ref 70–99)
GLUCOSE BLDC GLUCOMTR-MCNC: 159 MG/DL — HIGH (ref 70–99)
GLUCOSE BLDC GLUCOMTR-MCNC: 166 MG/DL — HIGH (ref 70–99)
GLUCOSE BLDC GLUCOMTR-MCNC: 196 MG/DL — HIGH (ref 70–99)
GLUCOSE BLDC GLUCOMTR-MCNC: 95 MG/DL — SIGNIFICANT CHANGE UP (ref 70–99)
GLUCOSE SERPL-MCNC: 153 MG/DL — HIGH (ref 70–115)
HCT VFR BLD CALC: 32.2 % — LOW (ref 42–52)
HGB BLD-MCNC: 10.7 G/DL — LOW (ref 14–18)
INR BLD: 1.2 RATIO — HIGH (ref 0.88–1.16)
MAGNESIUM SERPL-MCNC: 2.3 MG/DL — SIGNIFICANT CHANGE UP (ref 1.6–2.6)
MCHC RBC-ENTMCNC: 30.8 PG — SIGNIFICANT CHANGE UP (ref 27–31)
MCHC RBC-ENTMCNC: 33.2 G/DL — SIGNIFICANT CHANGE UP (ref 32–36)
MCV RBC AUTO: 92.8 FL — SIGNIFICANT CHANGE UP (ref 80–94)
PHOSPHATE SERPL-MCNC: 2.3 MG/DL — LOW (ref 2.4–4.7)
PLATELET # BLD AUTO: 92 K/UL — LOW (ref 150–400)
POTASSIUM SERPL-MCNC: 4.3 MMOL/L — SIGNIFICANT CHANGE UP (ref 3.5–5.3)
POTASSIUM SERPL-SCNC: 4.3 MMOL/L — SIGNIFICANT CHANGE UP (ref 3.5–5.3)
PROT SERPL-MCNC: 6 G/DL — LOW (ref 6.6–8.7)
PROTHROM AB SERPL-ACNC: 13.2 SEC — HIGH (ref 9.8–12.7)
RBC # BLD: 3.47 M/UL — LOW (ref 4.6–6.2)
RBC # FLD: 16 % — HIGH (ref 11–15.6)
SODIUM SERPL-SCNC: 143 MMOL/L — SIGNIFICANT CHANGE UP (ref 135–145)
TROPONIN T SERPL-MCNC: 0.51 NG/ML — HIGH (ref 0–0.06)
WBC # BLD: 9.2 K/UL — SIGNIFICANT CHANGE UP (ref 4.8–10.8)
WBC # FLD AUTO: 9.2 K/UL — SIGNIFICANT CHANGE UP (ref 4.8–10.8)

## 2017-12-14 PROCEDURE — 99233 SBSQ HOSP IP/OBS HIGH 50: CPT

## 2017-12-14 PROCEDURE — 71010: CPT | Mod: 26

## 2017-12-14 PROCEDURE — 93010 ELECTROCARDIOGRAM REPORT: CPT

## 2017-12-14 PROCEDURE — 99221 1ST HOSP IP/OBS SF/LOW 40: CPT | Mod: 57

## 2017-12-14 RX ORDER — DIGOXIN 250 MCG
0.5 TABLET ORAL ONCE
Qty: 0 | Refills: 0 | Status: COMPLETED | OUTPATIENT
Start: 2017-12-14 | End: 2017-12-14

## 2017-12-14 RX ORDER — INSULIN LISPRO 100/ML
VIAL (ML) SUBCUTANEOUS
Qty: 0 | Refills: 0 | Status: DISCONTINUED | OUTPATIENT
Start: 2017-12-14 | End: 2017-12-15

## 2017-12-14 RX ORDER — ACETAMINOPHEN 500 MG
1000 TABLET ORAL ONCE
Qty: 0 | Refills: 0 | Status: COMPLETED | OUTPATIENT
Start: 2017-12-14 | End: 2017-12-14

## 2017-12-14 RX ORDER — FUROSEMIDE 40 MG
40 TABLET ORAL DAILY
Qty: 0 | Refills: 0 | Status: DISCONTINUED | OUTPATIENT
Start: 2017-12-15 | End: 2017-12-15

## 2017-12-14 RX ORDER — MILRINONE LACTATE 1 MG/ML
0.25 INJECTION, SOLUTION INTRAVENOUS
Qty: 20 | Refills: 0 | Status: DISCONTINUED | OUTPATIENT
Start: 2017-12-14 | End: 2017-12-17

## 2017-12-14 RX ORDER — AMIODARONE HYDROCHLORIDE 400 MG/1
150 TABLET ORAL ONCE
Qty: 0 | Refills: 0 | Status: COMPLETED | OUTPATIENT
Start: 2017-12-14 | End: 2017-12-14

## 2017-12-14 RX ORDER — NOREPINEPHRINE BITARTRATE/D5W 8 MG/250ML
0.02 PLASTIC BAG, INJECTION (ML) INTRAVENOUS
Qty: 8 | Refills: 0 | Status: DISCONTINUED | OUTPATIENT
Start: 2017-12-14 | End: 2017-12-14

## 2017-12-14 RX ORDER — FUROSEMIDE 40 MG
20 TABLET ORAL ONCE
Qty: 0 | Refills: 0 | Status: COMPLETED | OUTPATIENT
Start: 2017-12-14 | End: 2017-12-14

## 2017-12-14 RX ORDER — HYDROMORPHONE HYDROCHLORIDE 2 MG/ML
0.5 INJECTION INTRAMUSCULAR; INTRAVENOUS; SUBCUTANEOUS ONCE
Qty: 0 | Refills: 0 | Status: DISCONTINUED | OUTPATIENT
Start: 2017-12-14 | End: 2017-12-14

## 2017-12-14 RX ORDER — INSULIN LISPRO 100/ML
4 VIAL (ML) SUBCUTANEOUS
Qty: 0 | Refills: 0 | Status: DISCONTINUED | OUTPATIENT
Start: 2017-12-14 | End: 2017-12-14

## 2017-12-14 RX ORDER — TIOTROPIUM BROMIDE 18 UG/1
1 CAPSULE ORAL; RESPIRATORY (INHALATION) DAILY
Qty: 0 | Refills: 0 | Status: DISCONTINUED | OUTPATIENT
Start: 2017-12-14 | End: 2017-12-20

## 2017-12-14 RX ORDER — DIGOXIN 250 MCG
0.25 TABLET ORAL ONCE
Qty: 0 | Refills: 0 | Status: COMPLETED | OUTPATIENT
Start: 2017-12-14 | End: 2017-12-14

## 2017-12-14 RX ORDER — ALBUMIN HUMAN 25 %
250 VIAL (ML) INTRAVENOUS ONCE
Qty: 0 | Refills: 0 | Status: COMPLETED | OUTPATIENT
Start: 2017-12-14 | End: 2017-12-14

## 2017-12-14 RX ORDER — FENTANYL CITRATE 50 UG/ML
50 INJECTION INTRAVENOUS ONCE
Qty: 0 | Refills: 0 | Status: DISCONTINUED | OUTPATIENT
Start: 2017-12-14 | End: 2017-12-14

## 2017-12-14 RX ORDER — NICARDIPINE HYDROCHLORIDE 30 MG/1
5 CAPSULE, EXTENDED RELEASE ORAL
Qty: 40 | Refills: 0 | Status: DISCONTINUED | OUTPATIENT
Start: 2017-12-14 | End: 2017-12-14

## 2017-12-14 RX ADMIN — Medication 250 MILLIGRAM(S): at 21:18

## 2017-12-14 RX ADMIN — Medication 0.5 MILLIGRAM(S): at 21:50

## 2017-12-14 RX ADMIN — AMIODARONE HYDROCHLORIDE 618 MILLIGRAM(S): 400 TABLET ORAL at 22:13

## 2017-12-14 RX ADMIN — Medication 50 MILLIEQUIVALENT(S): at 00:39

## 2017-12-14 RX ADMIN — HYDROMORPHONE HYDROCHLORIDE 0.5 MILLIGRAM(S): 2 INJECTION INTRAMUSCULAR; INTRAVENOUS; SUBCUTANEOUS at 06:15

## 2017-12-14 RX ADMIN — Medication 125 MILLILITER(S): at 05:48

## 2017-12-14 RX ADMIN — Medication 3 MILLILITER(S): at 14:47

## 2017-12-14 RX ADMIN — Medication 2: at 22:47

## 2017-12-14 RX ADMIN — CLOPIDOGREL BISULFATE 75 MILLIGRAM(S): 75 TABLET, FILM COATED ORAL at 13:45

## 2017-12-14 RX ADMIN — Medication 4 UNIT(S): at 12:00

## 2017-12-14 RX ADMIN — ATORVASTATIN CALCIUM 80 MILLIGRAM(S): 80 TABLET, FILM COATED ORAL at 21:18

## 2017-12-14 RX ADMIN — HYDROMORPHONE HYDROCHLORIDE 0.5 MILLIGRAM(S): 2 INJECTION INTRAMUSCULAR; INTRAVENOUS; SUBCUTANEOUS at 21:16

## 2017-12-14 RX ADMIN — AMIODARONE HYDROCHLORIDE 200 MILLIGRAM(S): 400 TABLET ORAL at 08:53

## 2017-12-14 RX ADMIN — Medication 20 MILLIGRAM(S): at 13:47

## 2017-12-14 RX ADMIN — Medication 100 MILLIGRAM(S): at 08:00

## 2017-12-14 RX ADMIN — Medication 400 MILLIGRAM(S): at 18:24

## 2017-12-14 RX ADMIN — Medication 3 MILLILITER(S): at 21:17

## 2017-12-14 RX ADMIN — Medication 0.25 MILLIGRAM(S): at 23:53

## 2017-12-14 RX ADMIN — Medication 100 MILLIGRAM(S): at 21:18

## 2017-12-14 RX ADMIN — Medication 400 MILLIGRAM(S): at 03:45

## 2017-12-14 RX ADMIN — Medication 100 MILLIGRAM(S): at 13:48

## 2017-12-14 RX ADMIN — HYDROMORPHONE HYDROCHLORIDE 0.5 MILLIGRAM(S): 2 INJECTION INTRAMUSCULAR; INTRAVENOUS; SUBCUTANEOUS at 22:22

## 2017-12-14 RX ADMIN — Medication 250 MILLIGRAM(S): at 09:00

## 2017-12-14 RX ADMIN — Medication 3 MILLILITER(S): at 03:18

## 2017-12-14 RX ADMIN — FENTANYL CITRATE 50 MICROGRAM(S): 50 INJECTION INTRAVENOUS at 00:50

## 2017-12-14 RX ADMIN — Medication 100 MILLIGRAM(S): at 08:53

## 2017-12-14 RX ADMIN — HYDROMORPHONE HYDROCHLORIDE 0.5 MILLIGRAM(S): 2 INJECTION INTRAMUSCULAR; INTRAVENOUS; SUBCUTANEOUS at 06:49

## 2017-12-14 RX ADMIN — Medication 1000 MILLIGRAM(S): at 04:00

## 2017-12-14 RX ADMIN — Medication 1000 MILLIGRAM(S): at 18:39

## 2017-12-14 RX ADMIN — Medication 325 MILLIGRAM(S): at 13:48

## 2017-12-14 RX ADMIN — Medication 100 MILLIGRAM(S): at 17:33

## 2017-12-14 RX ADMIN — Medication 100 MILLIGRAM(S): at 01:27

## 2017-12-14 RX ADMIN — PANTOPRAZOLE SODIUM 40 MILLIGRAM(S): 20 TABLET, DELAYED RELEASE ORAL at 08:53

## 2017-12-14 RX ADMIN — FENTANYL CITRATE 50 MICROGRAM(S): 50 INJECTION INTRAVENOUS at 00:35

## 2017-12-14 RX ADMIN — Medication 3 MILLILITER(S): at 09:01

## 2017-12-14 NOTE — CONSULT NOTE ADULT - SUBJECTIVE AND OBJECTIVE BOX
HPI:  Stalin Ronquillo is a 77 year old male with significant past medical history of atrial fibrillation on Amiodarone and Coumadin, CHF on Coreg and Lisinopril, COPD on Spiriva, DM II on Metformin, HTN, Medtronic PPM, CVA, and PCI,  known to cardiac surgery service as a preoperative patient for CABG, was brought in by EMS to [facility] 12/8/17 with complaints of gradual onset of shortness of breath and waxing and waning 8/10 chest tightness. In ER, patient was treated with sublingual NTG and BIPAP, found to have elevated BNP, negative cardiac enzymes, and supra therapeutic INR (3.97). Patient was transferred to [facility] in preparation of CABG and preoperative medical optimization. He underwent CABG with aortic valve replacement and mitral valve replacement.  He is NPO and has some anterior chest wall pains.    History mostly from wife: T2DM diagnosed about 2 years ago and [facility]  He has been on Metformin 500 mg BID for past 2 years. He does not test blood sugars and he admits to non-adherence with diabetic diet.  He denies diabetic microvascular disease.  He denies symptoms of hyperglycemia.       PAST MEDICAL & SURGICAL HISTORY:  Hypertension  Stroke  CHF (congestive heart failure)  Stented coronary artery  MI, old  Asthma  Atrial fibrillation  Diabetes  Bronchitis  Hyperlipidemia  Pacemaker  COPD (chronic obstructive pulmonary disease)  Cardiac arrhythmia  H/O hernia repair  Cardiac pacemaker    FAMILY HISTORY:  Family history unknown: adopted    Social History:  · Marital Status	  · Occupation	retired   · Lives With	spouse     Substance Use History:  · Substance Use	caffeine  · Caffeine Type	coffee  · Caffeine Amount/Frequency	1-2 cups/cans per day     Alcohol Use History:  · Have you ever consumed alcohol	never     Tobacco Usage:  · Tobacco Usage: Current every day smoker  · Tobacco Type: cigarettes  currently 5 cigs daily  · Number of Packs per Day: 1  · Number of yrs: 60  · Pack yrs: 60      REVIEW OF SYSTEMS:    Constitutional: No fever,no change in weight.  Eyes: No blurry vision,  no loss of vision.  Lungs: No shortness of breath, no wheezing, no cough  CV: No chest pain, no palpitation  GI: No nausea, no vomiting, no constipation, no diarrhea, no abdominal pain  : No urinary frequency,  Neurologic: No headaches, no burning or pain in feet, no tremor.  Psych: No depression, no anxiety, no trouble concentrating    MEDICATIONS  (STANDING):  ALBUTerol/ipratropium for Nebulization 3 milliLiter(s) Nebulizer every 6 hours  amiodarone    Tablet 200 milliGRAM(s) Oral daily  aspirin enteric coated 325 milliGRAM(s) Oral daily  atorvastatin 80 milliGRAM(s) Oral at bedtime  cefuroxime  IVPB 1500 milliGRAM(s) IV Intermittent every 8 hours  clopidogrel Tablet 75 milliGRAM(s) Oral daily  dextrose 50% Injectable 50 milliLiter(s) IV Push every 15 minutes  dextrose 50% Injectable 25 milliLiter(s) IV Push every 15 minutes  DOBUTamine Infusion 3 MICROgram(s)/kG/Min (7.551 mL/Hr) IV Continuous <Continuous>  docusate sodium 100 milliGRAM(s) Oral three times a day  insulin Infusion 2 Unit(s)/Hr (2 mL/Hr) IV Continuous <Continuous>  insulin lispro Injectable (HumaLOG) 4 Unit(s) SubCutaneous three times a day before meals  milrinone Infusion 0.375 MICROgram(s)/kG/Min (9.439 mL/Hr) IV Continuous <Continuous>  niCARdipine Infusion 5 mG/Hr (25 mL/Hr) IV Continuous <Continuous>  norepinephrine Infusion 0.019 MICROgram(s)/kG/Min (3 mL/Hr) IV Continuous <Continuous>  pantoprazole    Tablet 40 milliGRAM(s) Oral before breakfast  sodium chloride 0.9%. 1000 milliLiter(s) (10 mL/Hr) IV Continuous <Continuous>  sodium chloride 0.9%. 1000 milliLiter(s) (5 mL/Hr) IV Continuous <Continuous>  vancomycin  IVPB 1000 milliGRAM(s) IV Intermittent every 12 hours    MEDICATIONS  (PRN):  acetaminophen  IVPB. 1000 milliGRAM(s) IV Intermittent once PRN on request    Allergies: penicillin (Anaphylaxis)    PHYSICAL EXAM: limited - pt post op, cannot sit up    Vital Signs Last 24 Hrs  T(C): 36.4 (14 Dec 2017 15:00), Max: 36.4 (14 Dec 2017 15:00)  T(F): 97.5 (14 Dec 2017 15:00), Max: 97.5 (14 Dec 2017 15:00)  HR: 85 (14 Dec 2017 17:00) (79 - 85)  BP: --  BP(mean): --  RR: 21 (14 Dec 2017 17:00) (11 - 26)  SpO2: 93% (14 Dec 2017 17:00) (90% - 100%)    General appearance: elderly male, NAD  Eyes: Pupils equal, EOMI  Lungs: Normal respiratory excursion. Lungs clear anteriorly  CV: Regular cardiac rhythm. No murmur.   Abdomen: Soft, non tender, nondistended  Skin: dry MM  Neuro: Cranial nerves intact.   Psych: Normal affect, good judgement.            LABS:                        10.7   9.2   )-----------( 92       ( 14 Dec 2017 03:47 )             32.2     12-14    143  |  108<H>  |  34.0<H>  ----------------------------<  153<H>  4.3   |  20.0<L>  |  1.37<H>    Ca    8.1<L>      14 Dec 2017 03:47  Phos  2.3     12-14  Mg     2.3     12-14    TPro  6.0<L>  /  Alb  3.7  /  TBili  2.4<H>  /  DBili  0.6<H>  /  AST  54<H>  /  ALT  21  /  AlkPhos  55  12-14      LIVER FUNCTIONS - ( 14 Dec 2017 03:47 )  Alb: 3.7 g/dL / Pro: 6.0 g/dL / ALK PHOS: 55 U/L / ALT: 21 U/L / AST: 54 U/L / GGT: x           Thyroid Stimulating Hormone, Serum: 5.82 uIU/mL (12-08-17 @ 13:36)  T4, Serum: 7.1 ug/dL (12-08-17 @ 13:36)  Triiodothyronine, Total (T3 Total): 81 ng/dL (12-06-17 @ 14:29)  T4, Serum: 7.8 ug/dL (12-06-17 @ 14:29)  Thyroid Stimulating Hormone, Serum: 6.50 uIU/mL (12-06-17 @ 14:29)    CAPILLARY BLOOD GLUCOSE  POCT Blood Glucose.: 95 mg/dL (14 Dec 2017 16:52)  POCT Blood Glucose.: 127 mg/dL (14 Dec 2017 15:03)  POCT Blood Glucose.: 138 mg/dL (14 Dec 2017 13:31)  POCT Blood Glucose.: 151 mg/dL (14 Dec 2017 12:06)  POCT Blood Glucose.: 131 mg/dL (14 Dec 2017 10:12)  POCT Blood Glucose.: 103 mg/dL (14 Dec 2017 09:14)  POCT Blood Glucose.: 111 mg/dL (14 Dec 2017 08:22)  POCT Blood Glucose.: 135 mg/dL (14 Dec 2017 06:20)  POCT Blood Glucose.: 142 mg/dL (14 Dec 2017 04:25)  POCT Blood Glucose.: 146 mg/dL (14 Dec 2017 03:16)  POCT Blood Glucose.: 159 mg/dL (14 Dec 2017 02:11)  POCT Blood Glucose.: 157 mg/dL (14 Dec 2017 01:25)  POCT Blood Glucose.: 166 mg/dL (14 Dec 2017 00:08)  POCT Blood Glucose.: 163 mg/dL (13 Dec 2017 23:04)  POCT Blood Glucose.: 176 mg/dL (13 Dec 2017 21:59)  POCT Blood Glucose.: 171 mg/dL (13 Dec 2017 21:02)  POCT Blood Glucose.: 172 mg/dL (13 Dec 2017 20:19)  POCT Blood Glucose.: 178 mg/dL (13 Dec 2017 19:01)  POCT Blood Glucose.: 174 mg/dL (13 Dec 2017 17:57)  T4, Serum: 7.1 ug/dL (12-08 @ 13:36)

## 2017-12-14 NOTE — DIETITIAN INITIAL EVALUATION ADULT. - OTHER INFO
Pt is POD#1 s/p C3L, AVR, MVR. Prior to surgery, po intake was good (% at meals). UBW obtained from Oct 2017 hospitalization, current weight at that time noted to be 168#, will continue to monitor weights.

## 2017-12-14 NOTE — PROGRESS NOTE ADULT - ASSESSMENT
Patient is a 77 male with PMH of Afib, CAD s/p PCI, systolic CHF, DM, HLD, HTN, MI, PPM, CVA, COPD and current smoker.  Patient now POD#1 s/p C3L MVR, AVR and atrial appendage clip

## 2017-12-14 NOTE — CONSULT NOTE ADULT - ASSESSMENT
1.Cardio Renal Syndrome - 2    2.Hypoperfused Kidneys,    3.CAD ( Prior  MI )    On Diuretics & Currently Decongested,    On Low dose ACEi & Aldactone,        Will Follow, Discussed w. CTS Team,
Clinical COPD of unknown stage  no active bronchospasm      Plan:  LAMA/LABA/ICS  prn drug neb  Bedside Spirometry for baseline  clearance to follow
Stalin Ronquillo is a 77 year old male with significant past medical history of atrial fibrillation on Amiodarone and Coumadin, CHF on Coreg and Lisinopril, COPD on Spiriva, DM II on Metformin, HTN, Medtronic PPM, CVA, and PCI,  known to cardiac surgery service as a preoperative patient for CABG, was brought in by EMS to [facility] 12/8/17 with complaints of gradual onset of shortness of breath and waxing and waning 8/10 chest tightness. In ER, patient was treated with sublingual NTG and BIPAP, found to have elevated BNP, negative cardiac enzymes, and supra therapeutic INR (3.97). Patient was transferred to [facility] in preparation of CABG and preoperative medical optimization. He underwent CABG with aortic valve replacement and mitral valve replacement.  He is NPO and has some anterior chest wall pains.  1. 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   - cont glucose checks  2. s/p CABG - treatment as per primary team  3. HLD - cont high dose statin

## 2017-12-15 ENCOUNTER — TRANSCRIPTION ENCOUNTER (OUTPATIENT)
Age: 77
End: 2017-12-15

## 2017-12-15 LAB
ANION GAP SERPL CALC-SCNC: 16 MMOL/L — SIGNIFICANT CHANGE UP (ref 5–17)
BUN SERPL-MCNC: 38 MG/DL — HIGH (ref 8–20)
CALCIUM SERPL-MCNC: 8.1 MG/DL — LOW (ref 8.6–10.2)
CHLORIDE SERPL-SCNC: 105 MMOL/L — SIGNIFICANT CHANGE UP (ref 98–107)
CO2 SERPL-SCNC: 20 MMOL/L — LOW (ref 22–29)
CREAT SERPL-MCNC: 1.63 MG/DL — HIGH (ref 0.5–1.3)
GLUCOSE BLDC GLUCOMTR-MCNC: 110 MG/DL — HIGH (ref 70–99)
GLUCOSE BLDC GLUCOMTR-MCNC: 117 MG/DL — HIGH (ref 70–99)
GLUCOSE BLDC GLUCOMTR-MCNC: 118 MG/DL — HIGH (ref 70–99)
GLUCOSE BLDC GLUCOMTR-MCNC: 141 MG/DL — HIGH (ref 70–99)
GLUCOSE BLDC GLUCOMTR-MCNC: 144 MG/DL — HIGH (ref 70–99)
GLUCOSE BLDC GLUCOMTR-MCNC: 144 MG/DL — HIGH (ref 70–99)
GLUCOSE BLDC GLUCOMTR-MCNC: 152 MG/DL — HIGH (ref 70–99)
GLUCOSE BLDC GLUCOMTR-MCNC: 161 MG/DL — HIGH (ref 70–99)
GLUCOSE BLDC GLUCOMTR-MCNC: 171 MG/DL — HIGH (ref 70–99)
GLUCOSE BLDC GLUCOMTR-MCNC: 180 MG/DL — HIGH (ref 70–99)
GLUCOSE BLDC GLUCOMTR-MCNC: 192 MG/DL — HIGH (ref 70–99)
GLUCOSE BLDC GLUCOMTR-MCNC: 204 MG/DL — HIGH (ref 70–99)
GLUCOSE BLDC GLUCOMTR-MCNC: 96 MG/DL — SIGNIFICANT CHANGE UP (ref 70–99)
GLUCOSE SERPL-MCNC: 214 MG/DL — HIGH (ref 70–115)
HCT VFR BLD CALC: 29.9 % — LOW (ref 42–52)
HGB BLD-MCNC: 10 G/DL — LOW (ref 14–18)
MAGNESIUM SERPL-MCNC: 2.1 MG/DL — SIGNIFICANT CHANGE UP (ref 1.6–2.6)
MCHC RBC-ENTMCNC: 31.3 PG — HIGH (ref 27–31)
MCHC RBC-ENTMCNC: 33.4 G/DL — SIGNIFICANT CHANGE UP (ref 32–36)
MCV RBC AUTO: 93.4 FL — SIGNIFICANT CHANGE UP (ref 80–94)
PHOSPHATE SERPL-MCNC: 3.1 MG/DL — SIGNIFICANT CHANGE UP (ref 2.4–4.7)
PLATELET # BLD AUTO: 108 K/UL — LOW (ref 150–400)
POTASSIUM SERPL-MCNC: 4.7 MMOL/L — SIGNIFICANT CHANGE UP (ref 3.5–5.3)
POTASSIUM SERPL-SCNC: 4.7 MMOL/L — SIGNIFICANT CHANGE UP (ref 3.5–5.3)
RBC # BLD: 3.2 M/UL — LOW (ref 4.6–6.2)
RBC # FLD: 16.2 % — HIGH (ref 11–15.6)
SODIUM SERPL-SCNC: 141 MMOL/L — SIGNIFICANT CHANGE UP (ref 135–145)
WBC # BLD: 15.5 K/UL — HIGH (ref 4.8–10.8)
WBC # FLD AUTO: 15.5 K/UL — HIGH (ref 4.8–10.8)

## 2017-12-15 PROCEDURE — 99233 SBSQ HOSP IP/OBS HIGH 50: CPT

## 2017-12-15 PROCEDURE — 71010: CPT | Mod: 26

## 2017-12-15 PROCEDURE — 93010 ELECTROCARDIOGRAM REPORT: CPT | Mod: 76

## 2017-12-15 RX ORDER — ALBUMIN HUMAN 25 %
250 VIAL (ML) INTRAVENOUS ONCE
Qty: 0 | Refills: 0 | Status: COMPLETED | OUTPATIENT
Start: 2017-12-15 | End: 2017-12-15

## 2017-12-15 RX ORDER — INSULIN HUMAN 100 [IU]/ML
1 INJECTION, SOLUTION SUBCUTANEOUS
Qty: 250 | Refills: 0 | Status: DISCONTINUED | OUTPATIENT
Start: 2017-12-15 | End: 2017-12-15

## 2017-12-15 RX ORDER — ENOXAPARIN SODIUM 100 MG/ML
40 INJECTION SUBCUTANEOUS DAILY
Qty: 0 | Refills: 0 | Status: DISCONTINUED | OUTPATIENT
Start: 2017-12-15 | End: 2017-12-20

## 2017-12-15 RX ORDER — HYDROMORPHONE HYDROCHLORIDE 2 MG/ML
0.5 INJECTION INTRAMUSCULAR; INTRAVENOUS; SUBCUTANEOUS ONCE
Qty: 0 | Refills: 0 | Status: DISCONTINUED | OUTPATIENT
Start: 2017-12-15 | End: 2017-12-15

## 2017-12-15 RX ORDER — WARFARIN SODIUM 2.5 MG/1
2.5 TABLET ORAL ONCE
Qty: 0 | Refills: 0 | Status: COMPLETED | OUTPATIENT
Start: 2017-12-15 | End: 2017-12-15

## 2017-12-15 RX ORDER — ACETAMINOPHEN 500 MG
1000 TABLET ORAL ONCE
Qty: 0 | Refills: 0 | Status: COMPLETED | OUTPATIENT
Start: 2017-12-15 | End: 2017-12-15

## 2017-12-15 RX ORDER — INSULIN LISPRO 100/ML
2 VIAL (ML) SUBCUTANEOUS
Qty: 0 | Refills: 0 | Status: DISCONTINUED | OUTPATIENT
Start: 2017-12-15 | End: 2017-12-18

## 2017-12-15 RX ORDER — INSULIN GLARGINE 100 [IU]/ML
10 INJECTION, SOLUTION SUBCUTANEOUS AT BEDTIME
Qty: 0 | Refills: 0 | Status: DISCONTINUED | OUTPATIENT
Start: 2017-12-15 | End: 2017-12-18

## 2017-12-15 RX ORDER — NOREPINEPHRINE BITARTRATE/D5W 8 MG/250ML
0.01 PLASTIC BAG, INJECTION (ML) INTRAVENOUS
Qty: 8 | Refills: 0 | Status: DISCONTINUED | OUTPATIENT
Start: 2017-12-15 | End: 2017-12-15

## 2017-12-15 RX ADMIN — Medication 400 MILLIGRAM(S): at 08:55

## 2017-12-15 RX ADMIN — Medication 2 UNIT(S): at 11:39

## 2017-12-15 RX ADMIN — Medication 125 MILLILITER(S): at 02:18

## 2017-12-15 RX ADMIN — Medication 3 MILLILITER(S): at 08:58

## 2017-12-15 RX ADMIN — PANTOPRAZOLE SODIUM 40 MILLIGRAM(S): 20 TABLET, DELAYED RELEASE ORAL at 05:38

## 2017-12-15 RX ADMIN — HYDROMORPHONE HYDROCHLORIDE 0.5 MILLIGRAM(S): 2 INJECTION INTRAMUSCULAR; INTRAVENOUS; SUBCUTANEOUS at 13:30

## 2017-12-15 RX ADMIN — WARFARIN SODIUM 2.5 MILLIGRAM(S): 2.5 TABLET ORAL at 21:58

## 2017-12-15 RX ADMIN — Medication 250 MILLIGRAM(S): at 08:59

## 2017-12-15 RX ADMIN — INSULIN GLARGINE 10 UNIT(S): 100 INJECTION, SOLUTION SUBCUTANEOUS at 23:41

## 2017-12-15 RX ADMIN — Medication 100 MILLIGRAM(S): at 05:38

## 2017-12-15 RX ADMIN — Medication 100 MILLIGRAM(S): at 00:45

## 2017-12-15 RX ADMIN — Medication 2 UNIT(S): at 16:59

## 2017-12-15 RX ADMIN — AMIODARONE HYDROCHLORIDE 200 MILLIGRAM(S): 400 TABLET ORAL at 05:38

## 2017-12-15 RX ADMIN — Medication 1000 MILLIGRAM(S): at 22:45

## 2017-12-15 RX ADMIN — Medication 100 MILLIGRAM(S): at 21:58

## 2017-12-15 RX ADMIN — Medication 3 MILLILITER(S): at 15:17

## 2017-12-15 RX ADMIN — Medication 1000 MILLIGRAM(S): at 00:25

## 2017-12-15 RX ADMIN — ATORVASTATIN CALCIUM 80 MILLIGRAM(S): 80 TABLET, FILM COATED ORAL at 21:58

## 2017-12-15 RX ADMIN — Medication 3 MILLILITER(S): at 19:45

## 2017-12-15 RX ADMIN — Medication 400 MILLIGRAM(S): at 00:14

## 2017-12-15 RX ADMIN — Medication 100 MILLIGRAM(S): at 11:39

## 2017-12-15 RX ADMIN — Medication 2: at 08:27

## 2017-12-15 RX ADMIN — Medication 100 MILLIGRAM(S): at 08:59

## 2017-12-15 RX ADMIN — Medication 325 MILLIGRAM(S): at 11:39

## 2017-12-15 RX ADMIN — ENOXAPARIN SODIUM 40 MILLIGRAM(S): 100 INJECTION SUBCUTANEOUS at 11:38

## 2017-12-15 RX ADMIN — HYDROMORPHONE HYDROCHLORIDE 0.5 MILLIGRAM(S): 2 INJECTION INTRAMUSCULAR; INTRAVENOUS; SUBCUTANEOUS at 13:45

## 2017-12-15 RX ADMIN — Medication 3 MILLILITER(S): at 03:12

## 2017-12-15 RX ADMIN — Medication 400 MILLIGRAM(S): at 21:58

## 2017-12-15 RX ADMIN — Medication 125 MILLILITER(S): at 16:59

## 2017-12-15 RX ADMIN — Medication 125 MILLILITER(S): at 00:25

## 2017-12-15 RX ADMIN — Medication 1000 MILLIGRAM(S): at 09:10

## 2017-12-15 NOTE — PROGRESS NOTE ADULT - ASSESSMENT
Patient is a 77 male with PMH of Afib, CAD s/p PCI, systolic CHF, DM, HTN, MI, PPM, CVA, COPD and current smoker.  Patient now POD#2 s/p C3L MVR, AVR and atrial appendage clip,    CKD -3 , stable post - Surgery  Renal function stable       Problem/Plan - 1: Per CTS,  ·  Problem: Coronary artery disease involving native coronary artery of native heart without angina pectoris.      Plan: Patient now s/p CABG.  Requiring inotropic support post op.   Monitor Cardiac output and hemodynamics.       Problem/Plan - 2: per CTS  ·  Problem: Acute on chronic combined systolic and diastolic congestive heart failure.      Plan: Continue inotropic support.      Problem/Plan - 3: Per CTS,  ·  Problem: Hypertension.      Plan: Maintain SBP < 130 mm.,       Problem/Plan - 4: Per CTS,  ·  Problem: Atrial fibrillation.      Plan: S/p Appendage clip.  Paced ( PPM )

## 2017-12-15 NOTE — PROGRESS NOTE ADULT - ASSESSMENT
Patient is a 77 male with PMH of Afib, CAD s/p PCI, systolic CHF, DM, HLD, HTN, MI, PPM, CVA, COPD and current smoker.  Patient now POD#2 s/p C3L MVR, AVR and atrial appendage clip

## 2017-12-15 NOTE — PHYSICAL THERAPY INITIAL EVALUATION ADULT - LONG TERM MEMORY, REHAB EVAL
- Hb pre-op was 11.2 (8/18/17). Trending down.  Type and screen in am.    - (+) hematuria & bloody JESSICA output though minimal.  Repeating US.  Repeat CBC stable.     - continue to monitor        intact

## 2017-12-15 NOTE — PHYSICAL THERAPY INITIAL EVALUATION ADULT - ADDITIONAL COMMENTS
Pt. lives in a private home with 3 steps to enter without a handrail.  Wife is home at all times to assist.

## 2017-12-16 LAB
ALBUMIN SERPL ELPH-MCNC: 3.6 G/DL — SIGNIFICANT CHANGE UP (ref 3.3–5.2)
ALP SERPL-CCNC: 56 U/L — SIGNIFICANT CHANGE UP (ref 40–120)
ALT FLD-CCNC: 17 U/L — SIGNIFICANT CHANGE UP
ANION GAP SERPL CALC-SCNC: 14 MMOL/L — SIGNIFICANT CHANGE UP (ref 5–17)
ANION GAP SERPL CALC-SCNC: 14 MMOL/L — SIGNIFICANT CHANGE UP (ref 5–17)
APTT BLD: 28.4 SEC — SIGNIFICANT CHANGE UP (ref 27.5–37.4)
AST SERPL-CCNC: 26 U/L — SIGNIFICANT CHANGE UP
BILIRUB SERPL-MCNC: 2.1 MG/DL — HIGH (ref 0.4–2)
BUN SERPL-MCNC: 39 MG/DL — HIGH (ref 8–20)
BUN SERPL-MCNC: 42 MG/DL — HIGH (ref 8–20)
CALCIUM SERPL-MCNC: 8.5 MG/DL — LOW (ref 8.6–10.2)
CALCIUM SERPL-MCNC: 8.8 MG/DL — SIGNIFICANT CHANGE UP (ref 8.6–10.2)
CHLORIDE SERPL-SCNC: 102 MMOL/L — SIGNIFICANT CHANGE UP (ref 98–107)
CHLORIDE SERPL-SCNC: 106 MMOL/L — SIGNIFICANT CHANGE UP (ref 98–107)
CO2 SERPL-SCNC: 21 MMOL/L — LOW (ref 22–29)
CO2 SERPL-SCNC: 22 MMOL/L — SIGNIFICANT CHANGE UP (ref 22–29)
CREAT SERPL-MCNC: 1.31 MG/DL — HIGH (ref 0.5–1.3)
CREAT SERPL-MCNC: 1.38 MG/DL — HIGH (ref 0.5–1.3)
GLUCOSE BLDC GLUCOMTR-MCNC: 133 MG/DL — HIGH (ref 70–99)
GLUCOSE BLDC GLUCOMTR-MCNC: 164 MG/DL — HIGH (ref 70–99)
GLUCOSE BLDC GLUCOMTR-MCNC: 175 MG/DL — HIGH (ref 70–99)
GLUCOSE BLDC GLUCOMTR-MCNC: 238 MG/DL — HIGH (ref 70–99)
GLUCOSE SERPL-MCNC: 164 MG/DL — HIGH (ref 70–115)
GLUCOSE SERPL-MCNC: 205 MG/DL — HIGH (ref 70–115)
HCT VFR BLD CALC: 31.3 % — LOW (ref 42–52)
HGB BLD-MCNC: 10.4 G/DL — LOW (ref 14–18)
INR BLD: 1.17 RATIO — HIGH (ref 0.88–1.16)
MAGNESIUM SERPL-MCNC: 2.2 MG/DL — SIGNIFICANT CHANGE UP (ref 1.6–2.6)
MCHC RBC-ENTMCNC: 31 PG — SIGNIFICANT CHANGE UP (ref 27–31)
MCHC RBC-ENTMCNC: 33.2 G/DL — SIGNIFICANT CHANGE UP (ref 32–36)
MCV RBC AUTO: 93.4 FL — SIGNIFICANT CHANGE UP (ref 80–94)
PHOSPHATE SERPL-MCNC: 2.6 MG/DL — SIGNIFICANT CHANGE UP (ref 2.4–4.7)
PLATELET # BLD AUTO: 100 K/UL — LOW (ref 150–400)
POTASSIUM SERPL-MCNC: 4.2 MMOL/L — SIGNIFICANT CHANGE UP (ref 3.5–5.3)
POTASSIUM SERPL-MCNC: 4.5 MMOL/L — SIGNIFICANT CHANGE UP (ref 3.5–5.3)
POTASSIUM SERPL-SCNC: 4.2 MMOL/L — SIGNIFICANT CHANGE UP (ref 3.5–5.3)
POTASSIUM SERPL-SCNC: 4.5 MMOL/L — SIGNIFICANT CHANGE UP (ref 3.5–5.3)
PROT SERPL-MCNC: 6.1 G/DL — LOW (ref 6.6–8.7)
PROTHROM AB SERPL-ACNC: 12.9 SEC — HIGH (ref 9.8–12.7)
RBC # BLD: 3.35 M/UL — LOW (ref 4.6–6.2)
RBC # FLD: 16.2 % — HIGH (ref 11–15.6)
SODIUM SERPL-SCNC: 138 MMOL/L — SIGNIFICANT CHANGE UP (ref 135–145)
SODIUM SERPL-SCNC: 141 MMOL/L — SIGNIFICANT CHANGE UP (ref 135–145)
WBC # BLD: 11.5 K/UL — HIGH (ref 4.8–10.8)
WBC # FLD AUTO: 11.5 K/UL — HIGH (ref 4.8–10.8)

## 2017-12-16 PROCEDURE — 71010: CPT | Mod: 26

## 2017-12-16 PROCEDURE — 99233 SBSQ HOSP IP/OBS HIGH 50: CPT

## 2017-12-16 RX ORDER — ACETAMINOPHEN 500 MG
1000 TABLET ORAL ONCE
Qty: 0 | Refills: 0 | Status: COMPLETED | OUTPATIENT
Start: 2017-12-16 | End: 2017-12-16

## 2017-12-16 RX ORDER — FUROSEMIDE 40 MG
20 TABLET ORAL ONCE
Qty: 0 | Refills: 0 | Status: DISCONTINUED | OUTPATIENT
Start: 2017-12-16 | End: 2017-12-16

## 2017-12-16 RX ORDER — WARFARIN SODIUM 2.5 MG/1
2.5 TABLET ORAL ONCE
Qty: 0 | Refills: 0 | Status: COMPLETED | OUTPATIENT
Start: 2017-12-16 | End: 2017-12-16

## 2017-12-16 RX ORDER — FUROSEMIDE 40 MG
40 TABLET ORAL ONCE
Qty: 0 | Refills: 0 | Status: COMPLETED | OUTPATIENT
Start: 2017-12-16 | End: 2017-12-16

## 2017-12-16 RX ORDER — INSULIN LISPRO 100/ML
VIAL (ML) SUBCUTANEOUS
Qty: 0 | Refills: 0 | Status: DISCONTINUED | OUTPATIENT
Start: 2017-12-16 | End: 2017-12-20

## 2017-12-16 RX ADMIN — INSULIN GLARGINE 10 UNIT(S): 100 INJECTION, SOLUTION SUBCUTANEOUS at 21:15

## 2017-12-16 RX ADMIN — ATORVASTATIN CALCIUM 80 MILLIGRAM(S): 80 TABLET, FILM COATED ORAL at 21:15

## 2017-12-16 RX ADMIN — Medication 325 MILLIGRAM(S): at 12:08

## 2017-12-16 RX ADMIN — Medication 3 MILLILITER(S): at 07:38

## 2017-12-16 RX ADMIN — Medication 2 UNIT(S): at 12:13

## 2017-12-16 RX ADMIN — Medication 2 UNIT(S): at 08:00

## 2017-12-16 RX ADMIN — Medication 100 MILLIGRAM(S): at 21:15

## 2017-12-16 RX ADMIN — Medication 1000 MILLIGRAM(S): at 11:30

## 2017-12-16 RX ADMIN — Medication 3 MILLILITER(S): at 19:58

## 2017-12-16 RX ADMIN — Medication 400 MILLIGRAM(S): at 11:00

## 2017-12-16 RX ADMIN — WARFARIN SODIUM 2.5 MILLIGRAM(S): 2.5 TABLET ORAL at 21:15

## 2017-12-16 RX ADMIN — Medication 3 MILLILITER(S): at 03:46

## 2017-12-16 RX ADMIN — Medication 3 MILLILITER(S): at 14:36

## 2017-12-16 RX ADMIN — Medication 100 MILLIGRAM(S): at 05:47

## 2017-12-16 RX ADMIN — ENOXAPARIN SODIUM 40 MILLIGRAM(S): 100 INJECTION SUBCUTANEOUS at 12:08

## 2017-12-16 RX ADMIN — AMIODARONE HYDROCHLORIDE 200 MILLIGRAM(S): 400 TABLET ORAL at 05:47

## 2017-12-16 RX ADMIN — Medication 2 UNIT(S): at 17:40

## 2017-12-16 RX ADMIN — TIOTROPIUM BROMIDE 1 CAPSULE(S): 18 CAPSULE ORAL; RESPIRATORY (INHALATION) at 07:39

## 2017-12-16 RX ADMIN — Medication 100 MILLIGRAM(S): at 12:13

## 2017-12-16 RX ADMIN — Medication 40 MILLIGRAM(S): at 10:30

## 2017-12-16 RX ADMIN — PANTOPRAZOLE SODIUM 40 MILLIGRAM(S): 20 TABLET, DELAYED RELEASE ORAL at 05:47

## 2017-12-16 NOTE — PROGRESS NOTE ADULT - ASSESSMENT
CKD -3 , stable post - Surgery  Renal function stable  giving additional lasix 40mg po today       Problem/Plan - 1: Per CTS,  ·  Problem: Coronary artery disease involving native coronary artery of native heart without angina pectoris.      Plan: Patient now s/p CABG.  Requiring inotropic support post op.   Monitor Cardiac output and hemodynamics.       Problem/Plan - 2: per CTS  ·  Problem: Acute on chronic combined systolic and diastolic congestive heart failure.      Plan: Continue inotropic support.      Problem/Plan - 3: Per CTS,  ·  Problem: Hypertension.      Plan: Maintain SBP < 130 mm.,       Problem/Plan - 4: Per CTS,  ·  Problem: Atrial fibrillation.      Plan: S/p Appendage clip.  Paced ( PPM )

## 2017-12-16 NOTE — PROGRESS NOTE ADULT - ASSESSMENT
Patient is a 77 male with PMH of Afib, CAD s/p PCI, systolic CHF, DM, HLD, HTN, MI, PPM, CVA, COPD and current smoker.  Patient now POD#3 s/p C3L MVR, AVR and atrial appendage clip.  Patient weaning off inotropes well presently only on low dose milrinone

## 2017-12-16 NOTE — PROGRESS NOTE ADULT - ASSESSMENT
Stalin Ronquillo is a 77 year old male with significant past medical history of atrial fibrillation on Amiodarone and Coumadin, CHF on Coreg and Lisinopril, COPD on Spiriva, DM II on Metformin, HTN, Medtronic PPM, CVA, and PCI,  known to cardiac surgery service as a preoperative patient for CABG, was brought in by EMS to [facility] 12/8/17 with complaints of gradual onset of shortness of breath and waxing and waning 8/10 chest tightness. In ER, patient was treated with sublingual NTG and BIPAP, found to have elevated BNP, negative cardiac enzymes, and supra therapeutic INR (3.97). Patient was transferred to [facility] in preparation of CABG and preoperative medical optimization. He underwent CABG with aortic valve replacement and mitral valve replacement and clipping of left atrial appendage.  1. 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   - cont glucose checks

## 2017-12-17 DIAGNOSIS — K59.00 CONSTIPATION, UNSPECIFIED: ICD-10-CM

## 2017-12-17 LAB
ANION GAP SERPL CALC-SCNC: 14 MMOL/L — SIGNIFICANT CHANGE UP (ref 5–17)
APTT BLD: 26.6 SEC — LOW (ref 27.5–37.4)
BUN SERPL-MCNC: 40 MG/DL — HIGH (ref 8–20)
CALCIUM SERPL-MCNC: 8.7 MG/DL — SIGNIFICANT CHANGE UP (ref 8.6–10.2)
CHLORIDE SERPL-SCNC: 104 MMOL/L — SIGNIFICANT CHANGE UP (ref 98–107)
CO2 SERPL-SCNC: 22 MMOL/L — SIGNIFICANT CHANGE UP (ref 22–29)
CREAT SERPL-MCNC: 1.31 MG/DL — HIGH (ref 0.5–1.3)
GLUCOSE BLDC GLUCOMTR-MCNC: 127 MG/DL — HIGH (ref 70–99)
GLUCOSE BLDC GLUCOMTR-MCNC: 162 MG/DL — HIGH (ref 70–99)
GLUCOSE BLDC GLUCOMTR-MCNC: 165 MG/DL — HIGH (ref 70–99)
GLUCOSE BLDC GLUCOMTR-MCNC: 170 MG/DL — HIGH (ref 70–99)
GLUCOSE SERPL-MCNC: 140 MG/DL — HIGH (ref 70–115)
HCT VFR BLD CALC: 33.8 % — LOW (ref 42–52)
HGB BLD-MCNC: 11.3 G/DL — LOW (ref 14–18)
INR BLD: 1.23 RATIO — HIGH (ref 0.88–1.16)
MAGNESIUM SERPL-MCNC: 2.3 MG/DL — SIGNIFICANT CHANGE UP (ref 1.6–2.6)
MCHC RBC-ENTMCNC: 31.3 PG — HIGH (ref 27–31)
MCHC RBC-ENTMCNC: 33.4 G/DL — SIGNIFICANT CHANGE UP (ref 32–36)
MCV RBC AUTO: 93.6 FL — SIGNIFICANT CHANGE UP (ref 80–94)
PHOSPHATE SERPL-MCNC: 1.9 MG/DL — LOW (ref 2.4–4.7)
PLATELET # BLD AUTO: 127 K/UL — LOW (ref 150–400)
POTASSIUM SERPL-MCNC: 4.3 MMOL/L — SIGNIFICANT CHANGE UP (ref 3.5–5.3)
POTASSIUM SERPL-SCNC: 4.3 MMOL/L — SIGNIFICANT CHANGE UP (ref 3.5–5.3)
PROTHROM AB SERPL-ACNC: 13.6 SEC — HIGH (ref 9.8–12.7)
RBC # BLD: 3.61 M/UL — LOW (ref 4.6–6.2)
RBC # FLD: 16 % — HIGH (ref 11–15.6)
SODIUM SERPL-SCNC: 140 MMOL/L — SIGNIFICANT CHANGE UP (ref 135–145)
WBC # BLD: 14.2 K/UL — HIGH (ref 4.8–10.8)
WBC # FLD AUTO: 14.2 K/UL — HIGH (ref 4.8–10.8)

## 2017-12-17 PROCEDURE — 99233 SBSQ HOSP IP/OBS HIGH 50: CPT

## 2017-12-17 PROCEDURE — 71010: CPT | Mod: 26

## 2017-12-17 PROCEDURE — 71010: CPT | Mod: 26,77

## 2017-12-17 RX ORDER — CARVEDILOL PHOSPHATE 80 MG/1
3.12 CAPSULE, EXTENDED RELEASE ORAL EVERY 12 HOURS
Qty: 0 | Refills: 0 | Status: DISCONTINUED | OUTPATIENT
Start: 2017-12-17 | End: 2017-12-20

## 2017-12-17 RX ORDER — WARFARIN SODIUM 2.5 MG/1
2.5 TABLET ORAL ONCE
Qty: 0 | Refills: 0 | Status: COMPLETED | OUTPATIENT
Start: 2017-12-17 | End: 2017-12-17

## 2017-12-17 RX ORDER — IPRATROPIUM/ALBUTEROL SULFATE 18-103MCG
3 AEROSOL WITH ADAPTER (GRAM) INHALATION EVERY 6 HOURS
Qty: 0 | Refills: 0 | Status: DISCONTINUED | OUTPATIENT
Start: 2017-12-17 | End: 2017-12-20

## 2017-12-17 RX ORDER — FUROSEMIDE 40 MG
40 TABLET ORAL DAILY
Qty: 0 | Refills: 0 | Status: DISCONTINUED | OUTPATIENT
Start: 2017-12-17 | End: 2017-12-19

## 2017-12-17 RX ORDER — POLYETHYLENE GLYCOL 3350 17 G/17G
17 POWDER, FOR SOLUTION ORAL DAILY
Qty: 0 | Refills: 0 | Status: DISCONTINUED | OUTPATIENT
Start: 2017-12-17 | End: 2017-12-20

## 2017-12-17 RX ORDER — SPIRONOLACTONE 25 MG/1
25 TABLET, FILM COATED ORAL DAILY
Qty: 0 | Refills: 0 | Status: DISCONTINUED | OUTPATIENT
Start: 2017-12-17 | End: 2017-12-19

## 2017-12-17 RX ADMIN — Medication 325 MILLIGRAM(S): at 11:02

## 2017-12-17 RX ADMIN — PANTOPRAZOLE SODIUM 40 MILLIGRAM(S): 20 TABLET, DELAYED RELEASE ORAL at 06:46

## 2017-12-17 RX ADMIN — Medication 2 UNIT(S): at 17:28

## 2017-12-17 RX ADMIN — Medication 3 MILLILITER(S): at 20:57

## 2017-12-17 RX ADMIN — Medication 100 MILLIGRAM(S): at 21:43

## 2017-12-17 RX ADMIN — Medication 100 MILLIGRAM(S): at 06:47

## 2017-12-17 RX ADMIN — CARVEDILOL PHOSPHATE 3.12 MILLIGRAM(S): 80 CAPSULE, EXTENDED RELEASE ORAL at 17:29

## 2017-12-17 RX ADMIN — POLYETHYLENE GLYCOL 3350 17 GRAM(S): 17 POWDER, FOR SOLUTION ORAL at 11:03

## 2017-12-17 RX ADMIN — SPIRONOLACTONE 25 MILLIGRAM(S): 25 TABLET, FILM COATED ORAL at 06:46

## 2017-12-17 RX ADMIN — ENOXAPARIN SODIUM 40 MILLIGRAM(S): 100 INJECTION SUBCUTANEOUS at 11:03

## 2017-12-17 RX ADMIN — Medication 40 MILLIGRAM(S): at 06:46

## 2017-12-17 RX ADMIN — Medication 2 UNIT(S): at 11:06

## 2017-12-17 RX ADMIN — ATORVASTATIN CALCIUM 80 MILLIGRAM(S): 80 TABLET, FILM COATED ORAL at 21:43

## 2017-12-17 RX ADMIN — CARVEDILOL PHOSPHATE 3.12 MILLIGRAM(S): 80 CAPSULE, EXTENDED RELEASE ORAL at 06:46

## 2017-12-17 RX ADMIN — Medication 3 MILLILITER(S): at 03:29

## 2017-12-17 RX ADMIN — Medication 2: at 11:06

## 2017-12-17 RX ADMIN — INSULIN GLARGINE 10 UNIT(S): 100 INJECTION, SOLUTION SUBCUTANEOUS at 21:46

## 2017-12-17 RX ADMIN — Medication 2: at 08:21

## 2017-12-17 RX ADMIN — Medication 2 UNIT(S): at 08:21

## 2017-12-17 RX ADMIN — Medication 2: at 17:28

## 2017-12-17 RX ADMIN — AMIODARONE HYDROCHLORIDE 200 MILLIGRAM(S): 400 TABLET ORAL at 06:47

## 2017-12-17 RX ADMIN — TIOTROPIUM BROMIDE 1 CAPSULE(S): 18 CAPSULE ORAL; RESPIRATORY (INHALATION) at 09:41

## 2017-12-17 RX ADMIN — WARFARIN SODIUM 2.5 MILLIGRAM(S): 2.5 TABLET ORAL at 21:43

## 2017-12-17 NOTE — PROGRESS NOTE ADULT - ASSESSMENT
Pt is a 77y year old Male  s/p CABG, with aortic and mitral valve replacement  Clipping of left atrial appendage      Patent currently stable, NAD.

## 2017-12-17 NOTE — PROGRESS NOTE ADULT - ASSESSMENT
CKD -3 , stable post - Surgery  Renal function stable  Decreasing congestion; still bouts of shortness of breath  Can give lasix 40mg IVP x 1       Problem/Plan - 1: Per CTS,  ·  Problem: Coronary artery disease involving native coronary artery of native heart without angina pectoris.      Plan: Patient now s/p CABG.  Requiring inotropic support post op.   Monitor Cardiac output and hemodynamics.       Problem/Plan - 2: per CTS  ·  Problem: Acute on chronic combined systolic and diastolic congestive heart failure.      Plan: Continue inotropic support.      Problem/Plan - 3: Per CTS,  ·  Problem: Hypertension.      Plan: Maintain SBP < 130 mm.,       Problem/Plan - 4: Per CTS,  ·  Problem: Atrial fibrillation.      Plan: S/p Appendage clip.  Paced ( PPM )

## 2017-12-18 DIAGNOSIS — T14.90XA INJURY, UNSPECIFIED, INITIAL ENCOUNTER: ICD-10-CM

## 2017-12-18 DIAGNOSIS — I35.0 NONRHEUMATIC AORTIC (VALVE) STENOSIS: ICD-10-CM

## 2017-12-18 DIAGNOSIS — I25.10 ATHEROSCLEROTIC HEART DISEASE OF NATIVE CORONARY ARTERY WITHOUT ANGINA PECTORIS: ICD-10-CM

## 2017-12-18 DIAGNOSIS — J44.9 CHRONIC OBSTRUCTIVE PULMONARY DISEASE, UNSPECIFIED: ICD-10-CM

## 2017-12-18 LAB
ANION GAP SERPL CALC-SCNC: 13 MMOL/L — SIGNIFICANT CHANGE UP (ref 5–17)
BUN SERPL-MCNC: 42 MG/DL — HIGH (ref 8–20)
CALCIUM SERPL-MCNC: 9 MG/DL — SIGNIFICANT CHANGE UP (ref 8.6–10.2)
CHLORIDE SERPL-SCNC: 103 MMOL/L — SIGNIFICANT CHANGE UP (ref 98–107)
CO2 SERPL-SCNC: 27 MMOL/L — SIGNIFICANT CHANGE UP (ref 22–29)
CREAT SERPL-MCNC: 1.29 MG/DL — SIGNIFICANT CHANGE UP (ref 0.5–1.3)
GLUCOSE BLDC GLUCOMTR-MCNC: 113 MG/DL — HIGH (ref 70–99)
GLUCOSE BLDC GLUCOMTR-MCNC: 129 MG/DL — HIGH (ref 70–99)
GLUCOSE BLDC GLUCOMTR-MCNC: 159 MG/DL — HIGH (ref 70–99)
GLUCOSE BLDC GLUCOMTR-MCNC: 200 MG/DL — HIGH (ref 70–99)
GLUCOSE SERPL-MCNC: 117 MG/DL — HIGH (ref 70–115)
HCT VFR BLD CALC: 35.6 % — LOW (ref 42–52)
HGB BLD-MCNC: 11.9 G/DL — LOW (ref 14–18)
INR BLD: 1.27 RATIO — HIGH (ref 0.88–1.16)
MAGNESIUM SERPL-MCNC: 2.3 MG/DL — SIGNIFICANT CHANGE UP (ref 1.6–2.6)
MCHC RBC-ENTMCNC: 31.6 PG — HIGH (ref 27–31)
MCHC RBC-ENTMCNC: 33.4 G/DL — SIGNIFICANT CHANGE UP (ref 32–36)
MCV RBC AUTO: 94.4 FL — HIGH (ref 80–94)
PHOSPHATE SERPL-MCNC: 2.6 MG/DL — SIGNIFICANT CHANGE UP (ref 2.4–4.7)
PLATELET # BLD AUTO: 146 K/UL — LOW (ref 150–400)
POTASSIUM SERPL-MCNC: 4.5 MMOL/L — SIGNIFICANT CHANGE UP (ref 3.5–5.3)
POTASSIUM SERPL-SCNC: 4.5 MMOL/L — SIGNIFICANT CHANGE UP (ref 3.5–5.3)
PROTHROM AB SERPL-ACNC: 14 SEC — HIGH (ref 9.8–12.7)
RBC # BLD: 3.77 M/UL — LOW (ref 4.6–6.2)
RBC # FLD: 16.3 % — HIGH (ref 11–15.6)
SODIUM SERPL-SCNC: 143 MMOL/L — SIGNIFICANT CHANGE UP (ref 135–145)
SURGICAL PATHOLOGY FINAL REPORT - CH: SIGNIFICANT CHANGE UP
WBC # BLD: 13 K/UL — HIGH (ref 4.8–10.8)
WBC # FLD AUTO: 13 K/UL — HIGH (ref 4.8–10.8)

## 2017-12-18 PROCEDURE — 71010: CPT | Mod: 26

## 2017-12-18 PROCEDURE — 99233 SBSQ HOSP IP/OBS HIGH 50: CPT

## 2017-12-18 PROCEDURE — 99222 1ST HOSP IP/OBS MODERATE 55: CPT | Mod: GC

## 2017-12-18 RX ORDER — WARFARIN SODIUM 2.5 MG/1
2.5 TABLET ORAL ONCE
Qty: 0 | Refills: 0 | Status: COMPLETED | OUTPATIENT
Start: 2017-12-18 | End: 2017-12-18

## 2017-12-18 RX ORDER — METFORMIN HYDROCHLORIDE 850 MG/1
500 TABLET ORAL
Qty: 0 | Refills: 0 | Status: DISCONTINUED | OUTPATIENT
Start: 2017-12-18 | End: 2017-12-20

## 2017-12-18 RX ADMIN — CARVEDILOL PHOSPHATE 3.12 MILLIGRAM(S): 80 CAPSULE, EXTENDED RELEASE ORAL at 17:38

## 2017-12-18 RX ADMIN — SPIRONOLACTONE 25 MILLIGRAM(S): 25 TABLET, FILM COATED ORAL at 12:09

## 2017-12-18 RX ADMIN — Medication 1 APPLICATION(S): at 18:45

## 2017-12-18 RX ADMIN — Medication 100 MILLIGRAM(S): at 22:32

## 2017-12-18 RX ADMIN — Medication 2: at 12:52

## 2017-12-18 RX ADMIN — Medication 325 MILLIGRAM(S): at 12:09

## 2017-12-18 RX ADMIN — POLYETHYLENE GLYCOL 3350 17 GRAM(S): 17 POWDER, FOR SOLUTION ORAL at 12:09

## 2017-12-18 RX ADMIN — CARVEDILOL PHOSPHATE 3.12 MILLIGRAM(S): 80 CAPSULE, EXTENDED RELEASE ORAL at 05:24

## 2017-12-18 RX ADMIN — PANTOPRAZOLE SODIUM 40 MILLIGRAM(S): 20 TABLET, DELAYED RELEASE ORAL at 05:24

## 2017-12-18 RX ADMIN — ENOXAPARIN SODIUM 40 MILLIGRAM(S): 100 INJECTION SUBCUTANEOUS at 22:32

## 2017-12-18 RX ADMIN — Medication 100 MILLIGRAM(S): at 05:24

## 2017-12-18 RX ADMIN — AMIODARONE HYDROCHLORIDE 200 MILLIGRAM(S): 400 TABLET ORAL at 05:24

## 2017-12-18 RX ADMIN — WARFARIN SODIUM 2.5 MILLIGRAM(S): 2.5 TABLET ORAL at 22:32

## 2017-12-18 RX ADMIN — Medication 40 MILLIGRAM(S): at 05:24

## 2017-12-18 RX ADMIN — TIOTROPIUM BROMIDE 1 CAPSULE(S): 18 CAPSULE ORAL; RESPIRATORY (INHALATION) at 09:09

## 2017-12-18 RX ADMIN — METFORMIN HYDROCHLORIDE 500 MILLIGRAM(S): 850 TABLET ORAL at 18:45

## 2017-12-18 RX ADMIN — ATORVASTATIN CALCIUM 80 MILLIGRAM(S): 80 TABLET, FILM COATED ORAL at 22:32

## 2017-12-18 RX ADMIN — Medication 100 MILLIGRAM(S): at 12:09

## 2017-12-18 RX ADMIN — Medication 2: at 17:38

## 2017-12-18 NOTE — CONSULT NOTE ADULT - SUBJECTIVE AND OBJECTIVE BOX
HPI:  77 year old M significant past medical history of atrial fibrillation on Amiodarone and Coumadin, CHF on Coreg and Lisinopril, COPD on Spiriva, DM II on Metformin, HTN, Medtronic PPM, CVA, and PCI was brought in by EMS to [facility] 12/8/17 with complaints of gradual onset of shortness of breath and waxing and waning 8/10 chest tightness. In ER, patient was treated with sublingual NTG and BIPAP, found to have elevated BNP, negative cardiac enzymes, and supra therapeutic INR (3.97). Patient was transferred to [facility] in preparation of CABG and preoperative medical optimization.  Patient is s/p uncomplicated CABG x3, MVR, AVR, and atrial appendage clip on 12/13/17. Post op requiring inotropic support but has since been weaned off.  Being followed by endocrinology for T2DM in setting of CKD currently on insulin.  Followed by nephrology for cardiorenal syndrome, CKD, with improvement in cr.        REVIEW OF SYSTEMS  Constitutional - No fever, No fatigue  HEENT - No visual disturbances, No difficulty hearing,  No neck pain  Respiratory - No cough, No wheezing, No shortness of breath  Cardiovascular - No chest pain, No palpitations  Gastrointestinal - No abdominal pain, No nausea, No vomiting, No diarrhea, No constipation  Genitourinary - No dysuria, No frequency, No hematuria, No incontinence  Neurological - No headaches, No loss of strength, No numbness  Skin - No itching, No rashes  Endocrine - No temperature intolerance  Musculoskeletal - No joint pain, No joint swelling, No muscle pain  Psychiatric - No depression, No anxiety  All other review of systems negative    PAST MEDICAL & SURGICAL HISTORY  Atherosclerosis of native coronary artery without angina pectoris  Hypertension  Stroke  CHF (congestive heart failure)  Stented coronary artery  MI, old  Asthma  Atrial fibrillation  Diabetes  Bronchitis  Hyperlipidemia  Pacemaker  COPD (chronic obstructive pulmonary disease)  Nonrheumatic aortic valve insufficiency  Non-rheumatic mitral regurgitation  Chronic atrial fibrillation  Atrial fibrillation  Hypertension  Coronary artery disease involving native coronary artery of native heart without angina pectoris  Acute on chronic systolic CHF (congestive heart failure), NYHA class 3        SOCIAL HISTORY  Smoking - Denied  EtOH - Denied   Drugs - Denied    FUNCTIONAL HISTORY   Lives with spouse in a  with 3 VISHAL, no HR. Wife is home at all times to assist  Independent in ambulation, ADL's, transfers prior to hospitalization    CURRENT FUNCTIONAL STATUS  Bed mobility - Supervision  Transfers - Supervision   Gait - Supervision 35' w/RW    FAMILY HISTORY   Reviewed and non-contributory    ALLERGIES  OHS PT (Unknown)  penicillin (Anaphylaxis)    VITALS  T(C): 36.7 (12-18-17 @ 10:00)  T(F): 98 (12-18-17 @ 10:00), Max: 98.2 (12-17-17 @ 12:00)  HR: 83 (12-18-17 @ 10:00) (80 - 96)  BP: 107/76 (12-18-17 @ 10:00) (97/58 - 120/79)  RR:  (20 - 26)  SpO2:  (94% - 100%)  Wt(kg): --    PHYSICAL EXAM  Constitutional - NAD, Comfortable  HEENT - NCAT, EOMI  Neck - Supple  Chest - CTA bilaterally  Cardiovascular - RRR, S1S2  Abdomen - BS+, Soft, NTND  Extremities - No C/C/E, No calf tenderness   Neurologic Exam -                    Cognitive - Awake, Alert, Oriented to self, place, date, year, situation     Communication - Fluent, No dysarthria     Attention - able to recite days of week backwards     Memory - able to recall 3/3 items after 3 minutes     Cranial Nerves - CN 2-12 grossly intact     Motor -                     LEFT    UE - ShAB 5/5, EF 5/5, EE 5/5, WE 5/5,  5/5                    RIGHT UE - ShAB 5/5, EF 5/5, EE 5/5, WE 5/5,  5/5                    LEFT    LE - HF 5/5, KE 5/5, DF 5/5, PF 5/5                    RIGHT LE - HF 5/5, KE 5/5, DF 5/5, PF 5/5        Sensory - Intact to light touch diffusely     Reflexes - DTR intact and symmetrical, Negative Mitchell's bilaterally, Negative Babinski's bilaterally      Coordination - Finger-to-nose intact bilaterally   Psychiatric - Affect WNL    RECENT LABS/IMAGING                        11.9   13.0  )-----------( 146      ( 18 Dec 2017 05:56 )             35.6     12-18    143  |  103  |  42.0<H>  ----------------------------<  117<H>  4.5   |  27.0  |  1.29    Ca    9.0      18 Dec 2017 05:56  Phos  2.6     12-18  Mg     2.3     12-18      PT/INR - ( 18 Dec 2017 05:56 )   PT: 14.0 sec;   INR: 1.27 ratio         PTT - ( 17 Dec 2017 04:47 )  PTT:26.6 sec    MEDICATIONS   MEDICATIONS  (STANDING):  amiodarone    Tablet 200 milliGRAM(s) Oral daily  aspirin enteric coated 325 milliGRAM(s) Oral daily  atorvastatin 80 milliGRAM(s) Oral at bedtime  carvedilol 3.125 milliGRAM(s) Oral every 12 hours  docusate sodium 100 milliGRAM(s) Oral three times a day  enoxaparin Injectable 40 milliGRAM(s) SubCutaneous daily  furosemide    Tablet 40 milliGRAM(s) Oral daily  insulin glargine Injectable (LANTUS) 10 Unit(s) SubCutaneous at bedtime  insulin lispro (HumaLOG) corrective regimen sliding scale   SubCutaneous Before meals and at bedtime  insulin lispro Injectable (HumaLOG) 2 Unit(s) SubCutaneous three times a day before meals  pantoprazole    Tablet 40 milliGRAM(s) Oral before breakfast  polyethylene glycol 3350 17 Gram(s) Oral daily  spironolactone 25 milliGRAM(s) Oral daily  tiotropium 18 MICROgram(s) Capsule 1 Capsule(s) Inhalation daily    MEDICATIONS  (PRN):  ALBUTerol/ipratropium for Nebulization 3 milliLiter(s) Nebulizer every 6 hours PRN SOB/wheezing      ASSESSMENT/PLAN  77 year old M significant cardiac history currently s/p CABG x3, MVR, AVR, now with functional, gait, ADL impairments.    Disposition -  PT - ROM, Transfers, Ambulation, Will need stair assessment/training for home   Precautions - Falls, Cardiac  DVT Prophylaxis - Lovenox   Weight bearing status - WBAT   Diet - Consistent Carbohydrate HPI:  77 year old M significant past medical history of atrial fibrillation on Amiodarone and Coumadin, CHF on Coreg and Lisinopril, COPD on Spiriva, DM II on Metformin, HTN, Medtronic PPM, CVA, and PCI was brought in by EMS to [facility] 12/8/17 with complaints of gradual onset of shortness of breath and waxing and waning 8/10 chest tightness. In ER, patient was treated with sublingual NTG and BIPAP, found to have elevated BNP, negative cardiac enzymes, and supra therapeutic INR (3.97). Patient was transferred to [facility] in preparation of CABG and preoperative medical optimization.  Patient is s/p uncomplicated CABG x3, MVR, AVR, and atrial appendage clip on 12/13/17. Post op requiring inotropic support but has since been weaned off.  Being followed by endocrinology for T2DM in setting of CKD currently on insulin.  Followed by nephrology for cardiorenal syndrome, CKD, with improvement in cr.      Doing well. Had some SOB after ambulating with PT earlier but denies currently.    States coughing phlegm intermittently and has been using incentive spirometer.    Denies weakness, chest pain, swelling.    REVIEW OF SYSTEMS  Constitutional - No fever  HEENT - No visual disturbances,  Respiratory - intermittent cough, dyspnea on exertion   Cardiovascular - No chest pain  Gastrointestinal - No abdominal pain No constipation  Genitourinary - No incontinence  Neurological - No headaches, No loss of strength, No numbness  Skin - No itching, No rashes  Musculoskeletal - No joint pain, No joint swelling, No muscle pain  Psychiatric - No depression, No anxiety  All other review of systems negative    PAST MEDICAL & SURGICAL HISTORY  Atherosclerosis of native coronary artery without angina pectoris  Hypertension  CHF (congestive heart failure)  Stented coronary artery  MI, old  Atrial fibrillation  Diabetes  Bronchitis  Hyperlipidemia  Pacemaker  COPD (chronic obstructive pulmonary disease)  Nonrheumatic aortic valve insufficiency  Non-rheumatic mitral regurgitation    SOCIAL HISTORY  Smoking - Former, Quit last month   EtOH - Denied   Drugs - Denied    FUNCTIONAL HISTORY   Lives with spouse in a  with 3 VISHAL, no HR. Wife is home at all times to assist  Independent in ambulation, ADL's, transfers prior to hospitalization    CURRENT FUNCTIONAL STATUS  Bed mobility - Supervision  Transfers - Supervision   Gait - Supervision 35' w/RW    FAMILY HISTORY   Reviewed and non-contributory    ALLERGIES  OHS PT (Unknown)  penicillin (Anaphylaxis)    VITALS  T(C): 36.7 (12-18-17 @ 10:00)  T(F): 98 (12-18-17 @ 10:00), Max: 98.2 (12-17-17 @ 12:00)  HR: 83 (12-18-17 @ 10:00) (80 - 96)  BP: 107/76 (12-18-17 @ 10:00) (97/58 - 120/79)  RR:  (20 - 26)  SpO2:  (94% - 100%)  Wt(kg): --    PHYSICAL EXAM  Constitutional - NAD, Comfortable  HEENT - NCAT, EOMI  Neck - Supple  Chest - CTA bilaterally  Cardiovascular - irregularly irregular rhythm; sternal incision C/D/I   Abdomen - BS+, Soft, NTND  Extremities - No C/C/E, No calf tenderness   Neurologic Exam -                    Cognitive - Awake, Alert, Oriented to self, place, situation     Communication - Fluent, No dysarthria     Cranial Nerves - CN 2-12 grossly intact     Motor -                     LEFT    UE - ShAB 5/5, EF 5/5, EE 5/5, WE 5/5,  5/5                    RIGHT UE - ShAB 5/5, EF 5/5, EE 5/5, WE 5/5,  5/5                    LEFT    LE - HF 5/5, KE 5/5, DF 5/5, PF 5/5                    RIGHT LE - HF 5/5, KE 5/5, DF 5/5, PF 5/5        Sensory - Intact to light touch diffusely  Psychiatric - Affect WNL    RECENT LABS/IMAGING                        11.9   13.0  )-----------( 146      ( 18 Dec 2017 05:56 )             35.6     12-18    143  |  103  |  42.0<H>  ----------------------------<  117<H>  4.5   |  27.0  |  1.29    Ca    9.0      18 Dec 2017 05:56  Phos  2.6     12-18  Mg     2.3     12-18    PT/INR - ( 18 Dec 2017 05:56 )   PT: 14.0 sec;   INR: 1.27 ratio     PTT - ( 17 Dec 2017 04:47 )  PTT:26.6 sec    MEDICATIONS   MEDICATIONS  (STANDING):  amiodarone    Tablet 200 milliGRAM(s) Oral daily  aspirin enteric coated 325 milliGRAM(s) Oral daily  atorvastatin 80 milliGRAM(s) Oral at bedtime  carvedilol 3.125 milliGRAM(s) Oral every 12 hours  docusate sodium 100 milliGRAM(s) Oral three times a day  enoxaparin Injectable 40 milliGRAM(s) SubCutaneous daily  furosemide    Tablet 40 milliGRAM(s) Oral daily  insulin glargine Injectable (LANTUS) 10 Unit(s) SubCutaneous at bedtime  insulin lispro (HumaLOG) corrective regimen sliding scale   SubCutaneous Before meals and at bedtime  insulin lispro Injectable (HumaLOG) 2 Unit(s) SubCutaneous three times a day before meals  pantoprazole    Tablet 40 milliGRAM(s) Oral before breakfast  polyethylene glycol 3350 17 Gram(s) Oral daily  spironolactone 25 milliGRAM(s) Oral daily  tiotropium 18 MICROgram(s) Capsule 1 Capsule(s) Inhalation daily    MEDICATIONS  (PRN):  ALBUTerol/ipratropium for Nebulization 3 milliLiter(s) Nebulizer every 6 hours PRN SOB/wheezing      ASSESSMENT/PLAN  77 year old M significant cardiac history currently s/p CABG x3, MVR, AVR, now with functional, gait, ADL impairments.    Disposition -   PT - ROM, Transfers, Ambulation, Will need stair assessment/training for home   Precautions - Falls, Cardiac  DVT Prophylaxis - Lovenox   Weight bearing status - WBAT   Diet - Consistent Carbohydrate HPI:  77 year old M significant past medical history of atrial fibrillation on Amiodarone and Coumadin, CHF on Coreg and Lisinopril, COPD on Spiriva, DM II on Metformin, HTN, Medtronic PPM, CVA, and PCI was brought in by EMS to [facility] 12/8/17 with complaints of gradual onset of shortness of breath and waxing and waning 8/10 chest tightness. In ER, patient was treated with sublingual NTG and BIPAP, found to have elevated BNP, negative cardiac enzymes, and supra therapeutic INR (3.97). Patient was transferred to [facility] in preparation of CABG and preoperative medical optimization.  Patient is s/p uncomplicated CABG x3, MVR, AVR, and atrial appendage clip on 12/13/17. Post op requiring inotropic support but has since been weaned off.  Being followed by endocrinology for T2DM in setting of CKD currently on insulin.  Followed by nephrology for cardiorenal syndrome, CKD, with improvement in cr.      Doing well. Requiring supplemental O2 here but not at home.  Had some SOB after ambulating with PT earlier but denies currently.    States coughing phlegm intermittently and has been using incentive spirometer.    Denies weakness, chest pain, swelling.    REVIEW OF SYSTEMS  Constitutional - No fever  HEENT - No visual disturbances,  Respiratory - intermittent cough, dyspnea on exertion   Cardiovascular - No chest pain  Gastrointestinal - No abdominal pain No constipation  Genitourinary - No incontinence  Neurological - No headaches, No loss of strength, No numbness  Skin - No itching, No rashes  Musculoskeletal - No joint pain, No joint swelling, No muscle pain  Psychiatric - No depression, No anxiety  All other review of systems negative    PAST MEDICAL & SURGICAL HISTORY  Atherosclerosis of native coronary artery without angina pectoris  Hypertension  CHF (congestive heart failure)  Stented coronary artery  MI, old  Atrial fibrillation  Diabetes  Bronchitis  Hyperlipidemia  Pacemaker  COPD (chronic obstructive pulmonary disease)  Nonrheumatic aortic valve insufficiency  Non-rheumatic mitral regurgitation    SOCIAL HISTORY  Smoking - Former, Quit last month   EtOH - Denied   Drugs - Denied    FUNCTIONAL HISTORY   Lives with spouse in a  with 3 VISHAL, no HR. Wife is home at all times to assist  Independent in ambulation, ADL's, transfers prior to hospitalization    CURRENT FUNCTIONAL STATUS  Bed mobility - Min Assist  Transfers - Min Assist   Gait - Supervision 35' w/RW    FAMILY HISTORY   Reviewed and non-contributory    ALLERGIES  OHS PT (Unknown)  penicillin (Anaphylaxis)    VITALS  T(C): 36.7 (12-18-17 @ 10:00)  T(F): 98 (12-18-17 @ 10:00), Max: 98.2 (12-17-17 @ 12:00)  HR: 83 (12-18-17 @ 10:00) (80 - 96)  BP: 107/76 (12-18-17 @ 10:00) (97/58 - 120/79)  RR:  (20 - 26)  SpO2:  (94% - 100%)  Wt(kg): --    PHYSICAL EXAM  Constitutional - NAD, Comfortable  HEENT - NCAT, EOMI  Neck - Supple  Chest - CTA bilaterally  Cardiovascular - irregularly irregular rhythm; sternal incision C/D/I   Abdomen - BS+, Soft, NTND  Extremities - No C/C/E, No calf tenderness   Neurologic Exam -                    Cognitive - Awake, Alert, Oriented to self, place, situation     Communication - Fluent, No dysarthria     Cranial Nerves - CN 2-12 grossly intact     Motor -                     LEFT    UE - ShAB 5/5, EF 5/5, EE 5/5, WE 5/5,  5/5                    RIGHT UE - ShAB 5/5, EF 5/5, EE 5/5, WE 5/5,  5/5                    LEFT    LE - HF 5/5, KE 5/5, DF 5/5, PF 5/5                    RIGHT LE - HF 5/5, KE 5/5, DF 5/5, PF 5/5        Sensory - Intact to light touch diffusely  Psychiatric - Affect WNL    RECENT LABS/IMAGING                        11.9   13.0  )-----------( 146      ( 18 Dec 2017 05:56 )             35.6     12-18    143  |  103  |  42.0<H>  ----------------------------<  117<H>  4.5   |  27.0  |  1.29    Ca    9.0      18 Dec 2017 05:56  Phos  2.6     12-18  Mg     2.3     12-18    PT/INR - ( 18 Dec 2017 05:56 )   PT: 14.0 sec;   INR: 1.27 ratio     PTT - ( 17 Dec 2017 04:47 )  PTT:26.6 sec    MEDICATIONS   MEDICATIONS  (STANDING):  amiodarone    Tablet 200 milliGRAM(s) Oral daily  aspirin enteric coated 325 milliGRAM(s) Oral daily  atorvastatin 80 milliGRAM(s) Oral at bedtime  carvedilol 3.125 milliGRAM(s) Oral every 12 hours  docusate sodium 100 milliGRAM(s) Oral three times a day  enoxaparin Injectable 40 milliGRAM(s) SubCutaneous daily  furosemide    Tablet 40 milliGRAM(s) Oral daily  insulin glargine Injectable (LANTUS) 10 Unit(s) SubCutaneous at bedtime  insulin lispro (HumaLOG) corrective regimen sliding scale   SubCutaneous Before meals and at bedtime  insulin lispro Injectable (HumaLOG) 2 Unit(s) SubCutaneous three times a day before meals  pantoprazole    Tablet 40 milliGRAM(s) Oral before breakfast  polyethylene glycol 3350 17 Gram(s) Oral daily  spironolactone 25 milliGRAM(s) Oral daily  tiotropium 18 MICROgram(s) Capsule 1 Capsule(s) Inhalation daily    MEDICATIONS  (PRN):  ALBUTerol/ipratropium for Nebulization 3 milliLiter(s) Nebulizer every 6 hours PRN SOB/wheezing      ASSESSMENT/PLAN  77 year old M significant cardiac history currently s/p CABG x3, MVR, AVR, now with functional, gait, ADL impairments.    Disposition - Recommend ACUTE INPATIENT REHAB as patient will be able to tolerate 3 hrs/day x5-7 days/week of PT, OT with daily oversight of rehab physician.   PT - ROM, Transfers, Ambulation,  Precautions - Falls, Cardiac  DVT Prophylaxis - Lovenox   Weight bearing status - WBAT   Diet - Consistent Carbohydrate cc: Rehab evaluation on a 77 year old male who underwent OHS      HPI:  77 year old M significant past medical history of atrial fibrillation on Amiodarone and Coumadin, CHF on Coreg and Lisinopril, COPD on Spiriva, DM II on Metformin, HTN, Medtronic PPM, CVA, and PCI was brought in by EMS to [facility] 12/8/17 with complaints of gradual onset of shortness of breath and waxing and waning 8/10 chest tightness. In ER, patient was treated with sublingual NTG and BIPAP, found to have elevated BNP, negative cardiac enzymes, and supra therapeutic INR (3.97). Patient was transferred to [facility] in preparation of CABG and preoperative medical optimization.  Patient is s/p uncomplicated CABG x3, MVR, AVR, and atrial appendage clip on 12/13/17. Post op requiring inotropic support but has since been weaned off.  Being followed by endocrinology for T2DM in setting of CKD currently on insulin.  Followed by nephrology for cardiorenal syndrome, CKD, with improvement in cr.      Doing well. Requiring supplemental O2 here but not at home.  Had some SOB after ambulating with PT earlier but denies currently.    States coughing phlegm intermittently and has been using incentive spirometer.    Denies weakness, chest pain, swelling.    REVIEW OF SYSTEMS  Constitutional - No fever  HEENT - No visual disturbances,  Respiratory - intermittent cough, dyspnea on exertion   Cardiovascular - No chest pain  Gastrointestinal - No abdominal pain No constipation  Genitourinary - No incontinence  Neurological - No headaches, No loss of strength, No numbness  Skin - No itching, No rashes  Musculoskeletal - No joint pain, No joint swelling, No muscle pain  Psychiatric - No depression, No anxiety  All other review of systems negative    PAST MEDICAL & SURGICAL HISTORY  Atherosclerosis of native coronary artery without angina pectoris  Hypertension  CHF (congestive heart failure)  Stented coronary artery  MI, old  Atrial fibrillation  Diabetes  Bronchitis  Hyperlipidemia  Pacemaker  COPD (chronic obstructive pulmonary disease)  Nonrheumatic aortic valve insufficiency  Non-rheumatic mitral regurgitation    SOCIAL HISTORY  Smoking - Former, Quit last month   EtOH - Denied   Drugs - Denied    FUNCTIONAL HISTORY   Lives with spouse in a  with 3 VISHAL, no HR. Wife is home at all times to assist  Independent in ambulation, ADL's, transfers prior to hospitalization    CURRENT FUNCTIONAL STATUS  Bed mobility - Min Assist  Transfers - Min Assist   Gait - Supervision 35' w/RW    FAMILY HISTORY   Reviewed and non-contributory    ALLERGIES  OHS PT (Unknown)  penicillin (Anaphylaxis)    VITALS  T(C): 36.7 (12-18-17 @ 10:00)  T(F): 98 (12-18-17 @ 10:00), Max: 98.2 (12-17-17 @ 12:00)  HR: 83 (12-18-17 @ 10:00) (80 - 96)  BP: 107/76 (12-18-17 @ 10:00) (97/58 - 120/79)  RR:  (20 - 26)  SpO2:  (94% - 100%)  Wt(kg): --    PHYSICAL EXAM  Constitutional - NAD, Comfortable  HEENT:  Neck - Supple  Chest - CTA bilaterally  Cardiovascular - irregularly irregular rhythm; sternal incision C/D/I   Abdomen - BS+, Soft, NTND  Extremities - No C/C/E, No calf tenderness   Neurologic Exam -                    Cognitive - Awake, Alert, Oriented to self, place, situation      Motor -                     LEFT    UE - ShAB 5/5, EF 5/5, EE 5/5, WE 5/5,  5/5                    RIGHT UE - ShAB 5/5, EF 5/5, EE 5/5, WE 5/5,  5/5                    LEFT    LE - HF 5/5, KE 5/5, DF 5/5, PF 5/5                    RIGHT LE - HF 5/5, KE 5/5, DF 5/5, PF 5/5        Sensory - Intact to light touch diffusely  Psychiatric - Affect WNL    RECENT LABS/IMAGING                        11.9   13.0  )-----------( 146      ( 18 Dec 2017 05:56 )             35.6     12-18    143  |  103  |  42.0<H>  ----------------------------<  117<H>  4.5   |  27.0  |  1.29    Ca    9.0      18 Dec 2017 05:56  Phos  2.6     12-18  Mg     2.3     12-18    PT/INR - ( 18 Dec 2017 05:56 )   PT: 14.0 sec;   INR: 1.27 ratio     PTT - ( 17 Dec 2017 04:47 )  PTT:26.6 sec    MEDICATIONS   MEDICATIONS  (STANDING):  amiodarone    Tablet 200 milliGRAM(s) Oral daily  aspirin enteric coated 325 milliGRAM(s) Oral daily  atorvastatin 80 milliGRAM(s) Oral at bedtime  carvedilol 3.125 milliGRAM(s) Oral every 12 hours  docusate sodium 100 milliGRAM(s) Oral three times a day  enoxaparin Injectable 40 milliGRAM(s) SubCutaneous daily  furosemide    Tablet 40 milliGRAM(s) Oral daily  insulin glargine Injectable (LANTUS) 10 Unit(s) SubCutaneous at bedtime  insulin lispro (HumaLOG) corrective regimen sliding scale   SubCutaneous Before meals and at bedtime  insulin lispro Injectable (HumaLOG) 2 Unit(s) SubCutaneous three times a day before meals  pantoprazole    Tablet 40 milliGRAM(s) Oral before breakfast  polyethylene glycol 3350 17 Gram(s) Oral daily  spironolactone 25 milliGRAM(s) Oral daily  tiotropium 18 MICROgram(s) Capsule 1 Capsule(s) Inhalation daily    MEDICATIONS  (PRN):  ALBUTerol/ipratropium for Nebulization 3 milliLiter(s) Nebulizer every 6 hours PRN SOB/wheezing      ASSESSMENT/PLAN  77 year old M significant cardiac history currently s/p CABG x3, MVR, AVR, now with functional, gait, ADL impairments.    Disposition - Recommend ACUTE INPATIENT REHAB as patient will be able to tolerate 3 hrs/day x5-7 days/week of PT, OT with daily oversight of rehab physician. ELOS : 7-10 days. Goals: Modified Independent   PT - ROM, Transfers, Ambulation,  OT: ADLs      Precautions - Falls, Cardiac, PPM, nasal O2  DVT Prophylaxis - Lovenox   Weight bearing status - WBAT   Diet - Consistent Carbohydrate

## 2017-12-18 NOTE — PROGRESS NOTE ADULT - ASSESSMENT
76 y/o M with a h/o afib on coumadin CAD with prior PCI DM, HLD, HTN, MI PPM CVA COPD current smoker,   presented to [facility] in acute systolic HF and transferred to [facility], preop req vit K for elev INR)    on   C3L, MVR (t), AVR (t), ALLYSON clip       OR- PPM interrogated by EP (original settings VVI 60, set to VVI 80 for OR), given 2u FFP 2u Plt intraop, to ICU on Epi/Levo, transitioning from Epi to   : weanin’ .  Intermittent rapid afib, iv dig 0.5mg  12/15 dcd , d/cd swan. started coumadin, dcd plavix   Primacor weaned off.  Coreg, Lasix, aldactone started 78 y/o M with a h/o afib on coumadin CAD with prior PCI DM, HLD, HTN, MI PPM CVA COPD current smoker,   presented to [facility] in acute systolic HF and transferred to [facility], preop req vit K for elev INR)    on   C3L, MVR (t), AVR (t), ALLYSON clip       OR- PPM interrogated by EP (original settings VVI 60, set to VVI 80 for OR), given 2u FFP 2u Plt intraop, to ICU on Epi/Levo, transitioning from Epi to   : weanin’ .  Intermittent rapid afib, iv dig 0.5mg  12/15 dcd , d/cd swan. started coumadin, dcd plavix   Primacor weaned off.  Coreg, Lasix, aldactone started      Plan  acute rehab consult pending   Ambulate/ PT  [facility]  DW Dr Denny in rounds

## 2017-12-18 NOTE — PROGRESS NOTE ADULT - ASSESSMENT
CKD -3 , stable post - Surgery  Renal function stable  Decreasing congestion  Signing off; please recall if needed       Problem/Plan - 1: Per CTS,  ·  Problem: Coronary artery disease involving native coronary artery of native heart without angina pectoris.      Plan: Patient now s/p CABG.  Requiring inotropic support post op.   Monitor Cardiac output and hemodynamics.       Problem/Plan - 2: per CTS  ·  Problem: Acute on chronic combined systolic and diastolic congestive heart failure.      Plan: Continue inotropic support.      Problem/Plan - 3: Per CTS,  ·  Problem: Hypertension.      Plan: Maintain SBP < 130 mm.,       Problem/Plan - 4: Per CTS,  ·  Problem: Atrial fibrillation.      Plan: S/p Appendage clip.  Paced ( PPM )

## 2017-12-18 NOTE — CONSULT NOTE ADULT - CONSULT REASON
COPD, pre-op
T2DM
CABG - On Wednesday,    Pre Surgical Evaluation & Optimization of GFR
Rehab evaluation

## 2017-12-19 DIAGNOSIS — I95.2 HYPOTENSION DUE TO DRUGS: ICD-10-CM

## 2017-12-19 DIAGNOSIS — Z29.9 ENCOUNTER FOR PROPHYLACTIC MEASURES, UNSPECIFIED: ICD-10-CM

## 2017-12-19 DIAGNOSIS — I50.43 ACUTE ON CHRONIC COMBINED SYSTOLIC (CONGESTIVE) AND DIASTOLIC (CONGESTIVE) HEART FAILURE: ICD-10-CM

## 2017-12-19 DIAGNOSIS — E11.9 TYPE 2 DIABETES MELLITUS WITHOUT COMPLICATIONS: ICD-10-CM

## 2017-12-19 LAB
ANION GAP SERPL CALC-SCNC: 12 MMOL/L — SIGNIFICANT CHANGE UP (ref 5–17)
BUN SERPL-MCNC: 46 MG/DL — HIGH (ref 8–20)
CALCIUM SERPL-MCNC: 8.6 MG/DL — SIGNIFICANT CHANGE UP (ref 8.6–10.2)
CHLORIDE SERPL-SCNC: 105 MMOL/L — SIGNIFICANT CHANGE UP (ref 98–107)
CO2 SERPL-SCNC: 28 MMOL/L — SIGNIFICANT CHANGE UP (ref 22–29)
CREAT SERPL-MCNC: 1.36 MG/DL — HIGH (ref 0.5–1.3)
GLUCOSE BLDC GLUCOMTR-MCNC: 121 MG/DL — HIGH (ref 70–99)
GLUCOSE BLDC GLUCOMTR-MCNC: 255 MG/DL — HIGH (ref 70–99)
GLUCOSE BLDC GLUCOMTR-MCNC: 94 MG/DL — SIGNIFICANT CHANGE UP (ref 70–99)
GLUCOSE BLDC GLUCOMTR-MCNC: 99 MG/DL — SIGNIFICANT CHANGE UP (ref 70–99)
GLUCOSE SERPL-MCNC: 104 MG/DL — SIGNIFICANT CHANGE UP (ref 70–115)
HCT VFR BLD CALC: 36.2 % — LOW (ref 42–52)
HGB BLD-MCNC: 12 G/DL — LOW (ref 14–18)
INR BLD: 1.43 RATIO — HIGH (ref 0.88–1.16)
MAGNESIUM SERPL-MCNC: 2.1 MG/DL — SIGNIFICANT CHANGE UP (ref 1.6–2.6)
MCHC RBC-ENTMCNC: 31.5 PG — HIGH (ref 27–31)
MCHC RBC-ENTMCNC: 33.1 G/DL — SIGNIFICANT CHANGE UP (ref 32–36)
MCV RBC AUTO: 95 FL — HIGH (ref 80–94)
PHOSPHATE SERPL-MCNC: 3.4 MG/DL — SIGNIFICANT CHANGE UP (ref 2.4–4.7)
PLATELET # BLD AUTO: 142 K/UL — LOW (ref 150–400)
POTASSIUM SERPL-MCNC: 4.2 MMOL/L — SIGNIFICANT CHANGE UP (ref 3.5–5.3)
POTASSIUM SERPL-SCNC: 4.2 MMOL/L — SIGNIFICANT CHANGE UP (ref 3.5–5.3)
PROTHROM AB SERPL-ACNC: 15.8 SEC — HIGH (ref 9.8–12.7)
RBC # BLD: 3.81 M/UL — LOW (ref 4.6–6.2)
RBC # FLD: 16.6 % — HIGH (ref 11–15.6)
SODIUM SERPL-SCNC: 145 MMOL/L — SIGNIFICANT CHANGE UP (ref 135–145)
WBC # BLD: 11 K/UL — HIGH (ref 4.8–10.8)
WBC # FLD AUTO: 11 K/UL — HIGH (ref 4.8–10.8)

## 2017-12-19 PROCEDURE — 85610 PROTHROMBIN TIME: CPT

## 2017-12-19 PROCEDURE — 87086 URINE CULTURE/COLONY COUNT: CPT

## 2017-12-19 PROCEDURE — 71045 X-RAY EXAM CHEST 1 VIEW: CPT

## 2017-12-19 PROCEDURE — 36415 COLL VENOUS BLD VENIPUNCTURE: CPT

## 2017-12-19 PROCEDURE — 94640 AIRWAY INHALATION TREATMENT: CPT

## 2017-12-19 PROCEDURE — 99291 CRITICAL CARE FIRST HOUR: CPT | Mod: 25

## 2017-12-19 PROCEDURE — 80053 COMPREHEN METABOLIC PANEL: CPT

## 2017-12-19 PROCEDURE — 82553 CREATINE MB FRACTION: CPT

## 2017-12-19 PROCEDURE — 83880 ASSAY OF NATRIURETIC PEPTIDE: CPT

## 2017-12-19 PROCEDURE — 85730 THROMBOPLASTIN TIME PARTIAL: CPT

## 2017-12-19 PROCEDURE — 93306 TTE W/DOPPLER COMPLETE: CPT

## 2017-12-19 PROCEDURE — 82550 ASSAY OF CK (CPK): CPT

## 2017-12-19 PROCEDURE — 82962 GLUCOSE BLOOD TEST: CPT

## 2017-12-19 PROCEDURE — 96374 THER/PROPH/DIAG INJ IV PUSH: CPT

## 2017-12-19 PROCEDURE — 36600 WITHDRAWAL OF ARTERIAL BLOOD: CPT

## 2017-12-19 PROCEDURE — 96375 TX/PRO/DX INJ NEW DRUG ADDON: CPT

## 2017-12-19 PROCEDURE — 94760 N-INVAS EAR/PLS OXIMETRY 1: CPT

## 2017-12-19 PROCEDURE — 93005 ELECTROCARDIOGRAM TRACING: CPT

## 2017-12-19 PROCEDURE — 81001 URINALYSIS AUTO W/SCOPE: CPT

## 2017-12-19 PROCEDURE — 83036 HEMOGLOBIN GLYCOSYLATED A1C: CPT

## 2017-12-19 PROCEDURE — 93010 ELECTROCARDIOGRAM REPORT: CPT

## 2017-12-19 PROCEDURE — 84484 ASSAY OF TROPONIN QUANT: CPT

## 2017-12-19 PROCEDURE — 99232 SBSQ HOSP IP/OBS MODERATE 35: CPT

## 2017-12-19 PROCEDURE — 71010: CPT | Mod: 26

## 2017-12-19 PROCEDURE — 85027 COMPLETE CBC AUTOMATED: CPT

## 2017-12-19 PROCEDURE — 94660 CPAP INITIATION&MGMT: CPT

## 2017-12-19 PROCEDURE — 82803 BLOOD GASES ANY COMBINATION: CPT

## 2017-12-19 PROCEDURE — 83735 ASSAY OF MAGNESIUM: CPT

## 2017-12-19 RX ORDER — WARFARIN SODIUM 2.5 MG/1
4 TABLET ORAL ONCE
Qty: 0 | Refills: 0 | Status: COMPLETED | OUTPATIENT
Start: 2017-12-19 | End: 2017-12-19

## 2017-12-19 RX ORDER — WARFARIN SODIUM 2.5 MG/1
2.5 TABLET ORAL ONCE
Qty: 0 | Refills: 0 | Status: DISCONTINUED | OUTPATIENT
Start: 2017-12-19 | End: 2017-12-19

## 2017-12-19 RX ORDER — ALBUMIN HUMAN 25 %
250 VIAL (ML) INTRAVENOUS ONCE
Qty: 0 | Refills: 0 | Status: COMPLETED | OUTPATIENT
Start: 2017-12-19 | End: 2017-12-19

## 2017-12-19 RX ADMIN — Medication 1 APPLICATION(S): at 17:10

## 2017-12-19 RX ADMIN — Medication 4: at 12:16

## 2017-12-19 RX ADMIN — TIOTROPIUM BROMIDE 1 CAPSULE(S): 18 CAPSULE ORAL; RESPIRATORY (INHALATION) at 09:22

## 2017-12-19 RX ADMIN — CARVEDILOL PHOSPHATE 3.12 MILLIGRAM(S): 80 CAPSULE, EXTENDED RELEASE ORAL at 17:10

## 2017-12-19 RX ADMIN — PANTOPRAZOLE SODIUM 40 MILLIGRAM(S): 20 TABLET, DELAYED RELEASE ORAL at 06:02

## 2017-12-19 RX ADMIN — METFORMIN HYDROCHLORIDE 500 MILLIGRAM(S): 850 TABLET ORAL at 17:10

## 2017-12-19 RX ADMIN — Medication 100 MILLIGRAM(S): at 06:02

## 2017-12-19 RX ADMIN — Medication 325 MILLIGRAM(S): at 12:15

## 2017-12-19 RX ADMIN — POLYETHYLENE GLYCOL 3350 17 GRAM(S): 17 POWDER, FOR SOLUTION ORAL at 12:17

## 2017-12-19 RX ADMIN — AMIODARONE HYDROCHLORIDE 200 MILLIGRAM(S): 400 TABLET ORAL at 06:02

## 2017-12-19 RX ADMIN — METFORMIN HYDROCHLORIDE 500 MILLIGRAM(S): 850 TABLET ORAL at 12:15

## 2017-12-19 RX ADMIN — Medication 1 APPLICATION(S): at 06:02

## 2017-12-19 RX ADMIN — ENOXAPARIN SODIUM 40 MILLIGRAM(S): 100 INJECTION SUBCUTANEOUS at 21:39

## 2017-12-19 RX ADMIN — Medication 125 MILLILITER(S): at 14:13

## 2017-12-19 RX ADMIN — Medication 100 MILLIGRAM(S): at 12:18

## 2017-12-19 RX ADMIN — ATORVASTATIN CALCIUM 80 MILLIGRAM(S): 80 TABLET, FILM COATED ORAL at 21:39

## 2017-12-19 RX ADMIN — WARFARIN SODIUM 4 MILLIGRAM(S): 2.5 TABLET ORAL at 21:39

## 2017-12-19 RX ADMIN — Medication 100 MILLIGRAM(S): at 21:39

## 2017-12-19 NOTE — PROGRESS NOTE ADULT - ASSESSMENT
76 y/o M with a h/o afib on coumadin CAD with prior PCI DM, HLD, HTN, MI PPM CVA COPD current smoker,   presented to [facility] in acute systolic HF and transferred to [facility], preop req vit K for elev INR)    on   C3L, MVR (t), AVR (t), ALLYSON clip       OR- PPM interrogated by EP (original settings VVI 60, set to VVI 80 for OR), given 2u FFP 2u Plt intraop, to ICU on Epi/Levo, transitioning from Epi to   : weanin’ .  Intermittent rapid afib, iv dig 0.5mg  12/15 dcd , d/cd swan. started coumadin, dcd plavix   Primacor weaned off.  Coreg, Lasix, aldactone started   mild hypotension > diuretics held. BUN elevated, albumin x 1 given.      Plan  acute rehab consult accepted  Ambulate/ PT  [facility]  Possible [facility] discharge tomorrow if BP stable, therapeutic on INR and bed available.

## 2017-12-19 NOTE — PROGRESS NOTE ADULT - PROBLEM SELECTOR PLAN 10
on metformin and sliding scale. One elevated level today d/t delay in medication. Endocrine consult appreciated.

## 2017-12-20 ENCOUNTER — TRANSCRIPTION ENCOUNTER (OUTPATIENT)
Age: 77
End: 2017-12-20

## 2017-12-20 ENCOUNTER — INPATIENT (INPATIENT)
Facility: HOSPITAL | Age: 77
LOS: 1 days | Discharge: HOME HEALTH | DRG: 950 | End: 2017-12-22
Attending: PHYSICAL MEDICINE & REHABILITATION | Admitting: PHYSICAL MEDICINE & REHABILITATION
Payer: MEDICARE

## 2017-12-20 VITALS
RESPIRATION RATE: 18 BRPM | HEART RATE: 79 BPM | SYSTOLIC BLOOD PRESSURE: 92 MMHG | DIASTOLIC BLOOD PRESSURE: 60 MMHG | OXYGEN SATURATION: 99 %

## 2017-12-20 DIAGNOSIS — I25.2 OLD MYOCARDIAL INFARCTION: ICD-10-CM

## 2017-12-20 DIAGNOSIS — Z86.73 PERSONAL HISTORY OF TRANSIENT ISCHEMIC ATTACK (TIA), AND CEREBRAL INFARCTION WITHOUT RESIDUAL DEFICITS: ICD-10-CM

## 2017-12-20 DIAGNOSIS — Z48.812 ENCOUNTER FOR SURGICAL AFTERCARE FOLLOWING SURGERY ON THE CIRCULATORY SYSTEM: ICD-10-CM

## 2017-12-20 DIAGNOSIS — Z98.890 OTHER SPECIFIED POSTPROCEDURAL STATES: Chronic | ICD-10-CM

## 2017-12-20 DIAGNOSIS — Z51.89 ENCOUNTER FOR OTHER SPECIFIED AFTERCARE: ICD-10-CM

## 2017-12-20 DIAGNOSIS — I10 ESSENTIAL (PRIMARY) HYPERTENSION: ICD-10-CM

## 2017-12-20 DIAGNOSIS — Z79.01 LONG TERM (CURRENT) USE OF ANTICOAGULANTS: ICD-10-CM

## 2017-12-20 DIAGNOSIS — J44.9 CHRONIC OBSTRUCTIVE PULMONARY DISEASE, UNSPECIFIED: ICD-10-CM

## 2017-12-20 DIAGNOSIS — E11.9 TYPE 2 DIABETES MELLITUS WITHOUT COMPLICATIONS: ICD-10-CM

## 2017-12-20 DIAGNOSIS — Z95.1 PRESENCE OF AORTOCORONARY BYPASS GRAFT: ICD-10-CM

## 2017-12-20 DIAGNOSIS — Z72.0 TOBACCO USE: ICD-10-CM

## 2017-12-20 DIAGNOSIS — I35.0 NONRHEUMATIC AORTIC (VALVE) STENOSIS: ICD-10-CM

## 2017-12-20 DIAGNOSIS — Z95.0 PRESENCE OF CARDIAC PACEMAKER: Chronic | ICD-10-CM

## 2017-12-20 DIAGNOSIS — I34.0 NONRHEUMATIC MITRAL (VALVE) INSUFFICIENCY: ICD-10-CM

## 2017-12-20 DIAGNOSIS — I48.91 UNSPECIFIED ATRIAL FIBRILLATION: ICD-10-CM

## 2017-12-20 DIAGNOSIS — Z95.0 PRESENCE OF CARDIAC PACEMAKER: ICD-10-CM

## 2017-12-20 DIAGNOSIS — Z95.2 PRESENCE OF PROSTHETIC HEART VALVE: ICD-10-CM

## 2017-12-20 DIAGNOSIS — Z79.84 LONG TERM (CURRENT) USE OF ORAL HYPOGLYCEMIC DRUGS: ICD-10-CM

## 2017-12-20 DIAGNOSIS — N18.9 CHRONIC KIDNEY DISEASE, UNSPECIFIED: ICD-10-CM

## 2017-12-20 DIAGNOSIS — I50.9 HEART FAILURE, UNSPECIFIED: ICD-10-CM

## 2017-12-20 LAB
ANION GAP SERPL CALC-SCNC: 14 MMOL/L — SIGNIFICANT CHANGE UP (ref 5–17)
BUN SERPL-MCNC: 46 MG/DL — HIGH (ref 8–20)
CALCIUM SERPL-MCNC: 8.7 MG/DL — SIGNIFICANT CHANGE UP (ref 8.6–10.2)
CHLORIDE SERPL-SCNC: 105 MMOL/L — SIGNIFICANT CHANGE UP (ref 98–107)
CO2 SERPL-SCNC: 26 MMOL/L — SIGNIFICANT CHANGE UP (ref 22–29)
CREAT SERPL-MCNC: 1.42 MG/DL — HIGH (ref 0.5–1.3)
GLUCOSE BLDC GLUCOMTR-MCNC: 135 MG/DL — HIGH (ref 70–99)
GLUCOSE BLDC GLUCOMTR-MCNC: 143 MG/DL — HIGH (ref 70–99)
GLUCOSE BLDC GLUCOMTR-MCNC: 222 MG/DL — HIGH (ref 70–99)
GLUCOSE SERPL-MCNC: 132 MG/DL — HIGH (ref 70–115)
HCT VFR BLD CALC: 37.2 % — LOW (ref 42–52)
HGB BLD-MCNC: 12.3 G/DL — LOW (ref 14–18)
INR BLD: 1.53 RATIO — HIGH (ref 0.88–1.16)
MAGNESIUM SERPL-MCNC: 2.2 MG/DL — SIGNIFICANT CHANGE UP (ref 1.6–2.6)
MCHC RBC-ENTMCNC: 31.7 PG — HIGH (ref 27–31)
MCHC RBC-ENTMCNC: 33.1 G/DL — SIGNIFICANT CHANGE UP (ref 32–36)
MCV RBC AUTO: 95.9 FL — HIGH (ref 80–94)
PLATELET # BLD AUTO: 175 K/UL — SIGNIFICANT CHANGE UP (ref 150–400)
POTASSIUM SERPL-MCNC: 4.5 MMOL/L — SIGNIFICANT CHANGE UP (ref 3.5–5.3)
POTASSIUM SERPL-SCNC: 4.5 MMOL/L — SIGNIFICANT CHANGE UP (ref 3.5–5.3)
PROTHROM AB SERPL-ACNC: 17 SEC — HIGH (ref 9.8–12.7)
RBC # BLD: 3.88 M/UL — LOW (ref 4.6–6.2)
RBC # FLD: 16.9 % — HIGH (ref 11–15.6)
SODIUM SERPL-SCNC: 145 MMOL/L — SIGNIFICANT CHANGE UP (ref 135–145)
WBC # BLD: 12.1 K/UL — HIGH (ref 4.8–10.8)
WBC # FLD AUTO: 12.1 K/UL — HIGH (ref 4.8–10.8)

## 2017-12-20 PROCEDURE — 80048 BASIC METABOLIC PNL TOTAL CA: CPT

## 2017-12-20 PROCEDURE — 85730 THROMBOPLASTIN TIME PARTIAL: CPT

## 2017-12-20 PROCEDURE — 88305 TISSUE EXAM BY PATHOLOGIST: CPT

## 2017-12-20 PROCEDURE — 86900 BLOOD TYPING SEROLOGIC ABO: CPT

## 2017-12-20 PROCEDURE — 87640 STAPH A DNA AMP PROBE: CPT

## 2017-12-20 PROCEDURE — 84436 ASSAY OF TOTAL THYROXINE: CPT

## 2017-12-20 PROCEDURE — 83935 ASSAY OF URINE OSMOLALITY: CPT

## 2017-12-20 PROCEDURE — 97163 PT EVAL HIGH COMPLEX 45 MIN: CPT

## 2017-12-20 PROCEDURE — 84300 ASSAY OF URINE SODIUM: CPT

## 2017-12-20 PROCEDURE — P9037: CPT

## 2017-12-20 PROCEDURE — 84134 ASSAY OF PREALBUMIN: CPT

## 2017-12-20 PROCEDURE — 82306 VITAMIN D 25 HYDROXY: CPT

## 2017-12-20 PROCEDURE — 87086 URINE CULTURE/COLONY COUNT: CPT

## 2017-12-20 PROCEDURE — 82803 BLOOD GASES ANY COMBINATION: CPT

## 2017-12-20 PROCEDURE — 86850 RBC ANTIBODY SCREEN: CPT

## 2017-12-20 PROCEDURE — 83735 ASSAY OF MAGNESIUM: CPT

## 2017-12-20 PROCEDURE — 36430 TRANSFUSION BLD/BLD COMPNT: CPT

## 2017-12-20 PROCEDURE — 82947 ASSAY GLUCOSE BLOOD QUANT: CPT

## 2017-12-20 PROCEDURE — 86920 COMPATIBILITY TEST SPIN: CPT

## 2017-12-20 PROCEDURE — 99239 HOSP IP/OBS DSCHRG MGMT >30: CPT

## 2017-12-20 PROCEDURE — 97110 THERAPEUTIC EXERCISES: CPT

## 2017-12-20 PROCEDURE — 94640 AIRWAY INHALATION TREATMENT: CPT

## 2017-12-20 PROCEDURE — 84484 ASSAY OF TROPONIN QUANT: CPT

## 2017-12-20 PROCEDURE — 85610 PROTHROMBIN TIME: CPT

## 2017-12-20 PROCEDURE — P9045: CPT

## 2017-12-20 PROCEDURE — 84132 ASSAY OF SERUM POTASSIUM: CPT

## 2017-12-20 PROCEDURE — 82550 ASSAY OF CK (CPK): CPT

## 2017-12-20 PROCEDURE — 83036 HEMOGLOBIN GLYCOSYLATED A1C: CPT

## 2017-12-20 PROCEDURE — 82310 ASSAY OF CALCIUM: CPT

## 2017-12-20 PROCEDURE — 93880 EXTRACRANIAL BILAT STUDY: CPT

## 2017-12-20 PROCEDURE — 94760 N-INVAS EAR/PLS OXIMETRY 1: CPT

## 2017-12-20 PROCEDURE — 97530 THERAPEUTIC ACTIVITIES: CPT

## 2017-12-20 PROCEDURE — 71010: CPT | Mod: 26

## 2017-12-20 PROCEDURE — 84443 ASSAY THYROID STIM HORMONE: CPT

## 2017-12-20 PROCEDURE — 94003 VENT MGMT INPAT SUBQ DAY: CPT

## 2017-12-20 PROCEDURE — 80076 HEPATIC FUNCTION PANEL: CPT

## 2017-12-20 PROCEDURE — G0463: CPT

## 2017-12-20 PROCEDURE — 94770: CPT

## 2017-12-20 PROCEDURE — 99232 SBSQ HOSP IP/OBS MODERATE 35: CPT

## 2017-12-20 PROCEDURE — 81001 URINALYSIS AUTO W/SCOPE: CPT

## 2017-12-20 PROCEDURE — 85027 COMPLETE CBC AUTOMATED: CPT

## 2017-12-20 PROCEDURE — 82553 CREATINE MB FRACTION: CPT

## 2017-12-20 PROCEDURE — 71045 X-RAY EXAM CHEST 1 VIEW: CPT

## 2017-12-20 PROCEDURE — 80053 COMPREHEN METABOLIC PANEL: CPT

## 2017-12-20 PROCEDURE — 81003 URINALYSIS AUTO W/O SCOPE: CPT

## 2017-12-20 PROCEDURE — 83970 ASSAY OF PARATHORMONE: CPT

## 2017-12-20 PROCEDURE — C1889: CPT

## 2017-12-20 PROCEDURE — 82330 ASSAY OF CALCIUM: CPT

## 2017-12-20 PROCEDURE — 82436 ASSAY OF URINE CHLORIDE: CPT

## 2017-12-20 PROCEDURE — 71046 X-RAY EXAM CHEST 2 VIEWS: CPT

## 2017-12-20 PROCEDURE — 83880 ASSAY OF NATRIURETIC PEPTIDE: CPT

## 2017-12-20 PROCEDURE — 36415 COLL VENOUS BLD VENIPUNCTURE: CPT

## 2017-12-20 PROCEDURE — 94010 BREATHING CAPACITY TEST: CPT

## 2017-12-20 PROCEDURE — 76770 US EXAM ABDO BACK WALL COMP: CPT

## 2017-12-20 PROCEDURE — 97116 GAIT TRAINING THERAPY: CPT

## 2017-12-20 PROCEDURE — 93005 ELECTROCARDIOGRAM TRACING: CPT

## 2017-12-20 PROCEDURE — P9059: CPT

## 2017-12-20 PROCEDURE — 84480 ASSAY TRIIODOTHYRONINE (T3): CPT

## 2017-12-20 PROCEDURE — 82962 GLUCOSE BLOOD TEST: CPT

## 2017-12-20 PROCEDURE — 93306 TTE W/DOPPLER COMPLETE: CPT

## 2017-12-20 PROCEDURE — 86901 BLOOD TYPING SEROLOGIC RH(D): CPT

## 2017-12-20 PROCEDURE — 84156 ASSAY OF PROTEIN URINE: CPT

## 2017-12-20 PROCEDURE — 85014 HEMATOCRIT: CPT

## 2017-12-20 PROCEDURE — 82435 ASSAY OF BLOOD CHLORIDE: CPT

## 2017-12-20 PROCEDURE — 83605 ASSAY OF LACTIC ACID: CPT

## 2017-12-20 PROCEDURE — 84295 ASSAY OF SERUM SODIUM: CPT

## 2017-12-20 PROCEDURE — 84100 ASSAY OF PHOSPHORUS: CPT

## 2017-12-20 PROCEDURE — 87641 MR-STAPH DNA AMP PROBE: CPT

## 2017-12-20 PROCEDURE — 94002 VENT MGMT INPAT INIT DAY: CPT

## 2017-12-20 RX ORDER — POLYETHYLENE GLYCOL 3350 17 G/17G
17 POWDER, FOR SOLUTION ORAL
Qty: 0 | Refills: 0 | DISCHARGE
Start: 2017-12-20

## 2017-12-20 RX ORDER — ATORVASTATIN CALCIUM 80 MG/1
1 TABLET, FILM COATED ORAL
Qty: 0 | Refills: 0 | DISCHARGE
Start: 2017-12-20

## 2017-12-20 RX ORDER — FUROSEMIDE 40 MG
20 TABLET ORAL DAILY
Qty: 0 | Refills: 0 | Status: DISCONTINUED | OUTPATIENT
Start: 2017-12-21 | End: 2017-12-20

## 2017-12-20 RX ORDER — INSULIN LISPRO 100/ML
0 VIAL (ML) SUBCUTANEOUS
Qty: 0 | Refills: 0 | DISCHARGE
Start: 2017-12-20

## 2017-12-20 RX ORDER — CARVEDILOL PHOSPHATE 80 MG/1
1 CAPSULE, EXTENDED RELEASE ORAL
Qty: 0 | Refills: 0 | COMMUNITY

## 2017-12-20 RX ORDER — WARFARIN SODIUM 2.5 MG/1
3 TABLET ORAL DAILY
Qty: 0 | Refills: 0 | Status: DISCONTINUED | OUTPATIENT
Start: 2017-12-20 | End: 2017-12-20

## 2017-12-20 RX ORDER — OXYCODONE AND ACETAMINOPHEN 5; 325 MG/1; MG/1
1 TABLET ORAL EVERY 4 HOURS
Qty: 0 | Refills: 0 | Status: DISCONTINUED | OUTPATIENT
Start: 2017-12-20 | End: 2017-12-20

## 2017-12-20 RX ORDER — OXYCODONE AND ACETAMINOPHEN 5; 325 MG/1; MG/1
1 TABLET ORAL
Qty: 0 | Refills: 0 | DISCHARGE
Start: 2017-12-20

## 2017-12-20 RX ORDER — ENOXAPARIN SODIUM 100 MG/ML
30 INJECTION SUBCUTANEOUS
Qty: 0 | Refills: 0 | DISCHARGE
Start: 2017-12-20

## 2017-12-20 RX ORDER — CARVEDILOL PHOSPHATE 80 MG/1
1 CAPSULE, EXTENDED RELEASE ORAL
Qty: 0 | Refills: 0 | DISCHARGE
Start: 2017-12-20

## 2017-12-20 RX ORDER — FUROSEMIDE 40 MG
1 TABLET ORAL
Qty: 0 | Refills: 0 | DISCHARGE
Start: 2017-12-20

## 2017-12-20 RX ORDER — AMIODARONE HYDROCHLORIDE 400 MG/1
1 TABLET ORAL
Qty: 0 | Refills: 0 | DISCHARGE
Start: 2017-12-20

## 2017-12-20 RX ORDER — DOCUSATE SODIUM 100 MG
1 CAPSULE ORAL
Qty: 0 | Refills: 0 | DISCHARGE
Start: 2017-12-20

## 2017-12-20 RX ORDER — PANTOPRAZOLE SODIUM 20 MG/1
1 TABLET, DELAYED RELEASE ORAL
Qty: 0 | Refills: 0 | DISCHARGE
Start: 2017-12-20

## 2017-12-20 RX ORDER — WARFARIN SODIUM 2.5 MG/1
4 TABLET ORAL ONCE
Qty: 0 | Refills: 0 | Status: COMPLETED | OUTPATIENT
Start: 2017-12-20 | End: 2017-12-20

## 2017-12-20 RX ORDER — WARFARIN SODIUM 2.5 MG/1
0 TABLET ORAL
Qty: 0 | Refills: 0 | COMMUNITY

## 2017-12-20 RX ADMIN — OXYCODONE AND ACETAMINOPHEN 1 TABLET(S): 5; 325 TABLET ORAL at 11:00

## 2017-12-20 RX ADMIN — POLYETHYLENE GLYCOL 3350 17 GRAM(S): 17 POWDER, FOR SOLUTION ORAL at 11:58

## 2017-12-20 RX ADMIN — TIOTROPIUM BROMIDE 1 CAPSULE(S): 18 CAPSULE ORAL; RESPIRATORY (INHALATION) at 09:41

## 2017-12-20 RX ADMIN — CARVEDILOL PHOSPHATE 3.12 MILLIGRAM(S): 80 CAPSULE, EXTENDED RELEASE ORAL at 05:42

## 2017-12-20 RX ADMIN — Medication 325 MILLIGRAM(S): at 11:58

## 2017-12-20 RX ADMIN — OXYCODONE AND ACETAMINOPHEN 1 TABLET(S): 5; 325 TABLET ORAL at 10:18

## 2017-12-20 RX ADMIN — WARFARIN SODIUM 4 MILLIGRAM(S): 2.5 TABLET ORAL at 12:49

## 2017-12-20 RX ADMIN — AMIODARONE HYDROCHLORIDE 200 MILLIGRAM(S): 400 TABLET ORAL at 05:43

## 2017-12-20 RX ADMIN — METFORMIN HYDROCHLORIDE 500 MILLIGRAM(S): 850 TABLET ORAL at 08:12

## 2017-12-20 RX ADMIN — Medication 1 APPLICATION(S): at 05:43

## 2017-12-20 RX ADMIN — Medication 1 APPLICATION(S): at 17:29

## 2017-12-20 RX ADMIN — METFORMIN HYDROCHLORIDE 500 MILLIGRAM(S): 850 TABLET ORAL at 17:28

## 2017-12-20 RX ADMIN — PANTOPRAZOLE SODIUM 40 MILLIGRAM(S): 20 TABLET, DELAYED RELEASE ORAL at 05:42

## 2017-12-20 RX ADMIN — Medication 100 MILLIGRAM(S): at 11:58

## 2017-12-20 RX ADMIN — Medication 4: at 08:11

## 2017-12-20 NOTE — DISCHARGE NOTE ADULT - PLAN OF CARE
recover from surgery Follow up appointment with Dr Denny when discharged from rehab  Cardiac surgery office second floor at [facility]  Please come to ED or Call Cardio thoracic office at [phone] if Chest pain, Shortness of Breath, persistant Nausea & vomiting, oozing from wounds,palpitations or pain not relieved with medication  Weigh yourself daily>notify surgeons office if  2 lb increase in weight in 24 hours..  Shower daily, no bathing swimming in a pool or the ocean until instructed by MD.    We advise that you do not drive until instructed by MD.   You may not return to work until instructed by MD.   DO NOT APPLY CREAM POWDER LOTION OR OINTMENT TO ANY SURGICAL WOUND>THIS CAN IMPEDE HEALING AND CAUSE AN INFECTION    Please eat a low fat low salt diet.  Wash incisions with soap and water using washcloth in shower daily

## 2017-12-20 NOTE — PROGRESS NOTE ADULT - PROBLEM SELECTOR PROBLEM 4
Coronary artery disease involving native coronary artery of native heart without angina pectoris
Constipation
Atrial fibrillation
Constipation
Atrial fibrillation
Coronary artery disease involving native coronary artery of native heart without angina pectoris
Coronary artery disease involving native coronary artery of native heart without angina pectoris

## 2017-12-20 NOTE — PROGRESS NOTE ADULT - ASSESSMENT
76 y/o M with a h/o afib on coumadin CAD with prior PCI DM, HLD, HTN, MI PPM CVA COPD current smoker,   presented to [facility] in acute systolic HF and transferred to [facility], preop req vit K for elev INR)    on   C3L, MVR (t), AVR (t), ALLYSON clip       OR- PPM interrogated by EP (original settings VVI 60, set to VVI 80 for OR), given 2u FFP 2u Plt intraop, to ICU on Epi/Levo, transitioning from Epi to   : weanin’ .  Intermittent rapid afib, iv dig 0.5mg  12/15 dcd , d/cd swan. started coumadin, dcd plavix   Primacor weaned off.  Coreg, Lasix, aldactone started   mild hypotension > diuretics held. BUN elevated, albumin x 1 given.

## 2017-12-20 NOTE — PROGRESS NOTE ADULT - PROBLEM SELECTOR PLAN 5
Pt had been on inotropic support > now off.  Mild hypotension today s/p coreg, amiodarone and diuretics. BUN elevated:  Diuretics held. Albumin x 1 given. BP improved.   Echo prior to discharge while on coumadin.
diuretics resumed low dose  Mild hypotension today s/p coreg, amiodarone and diuretics. BUN elevated:  Diuretics held. Albumin x 1 given. BP improved.   Echo prior to discharge while on coumadin.
s/p C3L  cont lipitor, BB ASA

## 2017-12-20 NOTE — PROGRESS NOTE ADULT - PROBLEM SELECTOR PROBLEM 3
Chronic bronchitis, unspecified chronic bronchitis type
Non-rheumatic mitral regurgitation
Hypertension
Hypertension
Non-rheumatic mitral regurgitation
Chronic bronchitis, unspecified chronic bronchitis type
Chronic bronchitis, unspecified chronic bronchitis type
Hypertension
Non-rheumatic mitral regurgitation
Non-rheumatic mitral regurgitation

## 2017-12-20 NOTE — PROGRESS NOTE ADULT - PROVIDER SPECIALTY LIST ADULT
CT Surgery
Electrophysiology
Endocrinology
Endocrinology
Nephrology
Pulmonology
Rehab Medicine
Rehab Medicine
Nephrology
Nephrology
Critical Care
CT Surgery
CT Surgery
CTU
CT Surgery

## 2017-12-20 NOTE — PROGRESS NOTE ADULT - PROBLEM SELECTOR PLAN 4
cont coreg, ,lipitor  CABG next week
add colace and miralax  no signs of acute abdomen, continue to monitor
S/p Appendage clip.  Presently paced via patient's own PPM
add colace and miralax  no signs of acute abdomen, continue to monitor
S/p Appendage clip.  Presently paced via patient's own PPM
S/p Appendage clip.  Presently paced via patient's own PPM.  Received first dose of coumadin (2.5mg last night)
cont  [facility]in - [facility]
cont  [facility]in - [facility]> will be long termplan  Dr Denny to follow after discharge rehab x 1 month
cont coreg, ,lipitor  CABG Wednesday
cont coreg, ,lipitor  CABG next week

## 2017-12-20 NOTE — DISCHARGE NOTE ADULT - MEDICATION SUMMARY - MEDICATIONS TO STOP TAKING
I will STOP taking the medications listed below when I get home from the hospital:    lisinopril 5 mg oral tablet  -- 1 tab(s) by mouth 2 times a day

## 2017-12-20 NOTE — DISCHARGE NOTE ADULT - NS AS DC FOLLOWUP STROKE INST
Coumadin/Warfarin Influenza vaccination ([facility] Pub Date: August 19, 2014)/Heart Failure/Smoking Cessation/Coumadin/Warfarin

## 2017-12-20 NOTE — PROGRESS NOTE ADULT - PROBLEM SELECTOR PROBLEM 2
Acute on chronic systolic CHF (congestive heart failure), NYHA class 3
Acute on chronic combined systolic and diastolic congestive heart failure
Acute on chronic combined systolic and diastolic congestive heart failure
Acute on chronic systolic CHF (congestive heart failure), NYHA class 3
Acute on chronic combined systolic and diastolic congestive heart failure
Aortic stenosis
Aortic stenosis
Chronic atrial fibrillation

## 2017-12-20 NOTE — DISCHARGE NOTE ADULT - CARE PROVIDER_API CALL
Americo Denny), Surgery; Thoracic and Cardiac Surgery  [address] [place] [address]  Phone: [phone]  Fax: [phone]

## 2017-12-20 NOTE — DISCHARGE NOTE ADULT - MEDICATION SUMMARY - MEDICATIONS TO TAKE
I will START or STAY ON the medications listed below when I get home from the hospital:    oxyCODONE-acetaminophen 5 mg-325 mg oral tablet  -- 1 tab(s) by mouth every 4 hours, As needed, Moderate Pain (4 - 6)  -- Indication: For Pain    aspirin 81 mg oral tablet  -- 1 tab(s) by mouth once a day  -- Indication: For Aspirin    amiodarone 200 mg oral tablet  -- 1 tab(s) by mouth once a day for 30 days please  -- Indication: For rhythm regulation    enoxaparin  -- 30 milligram(s) subcutaneous once a day  intil INR therapeutic above 2.0  -- Indication: For DVT prophylaxis    Coumadin  -- M-F 5mg, Sa-Liao 2.5mg   INR check 2 times week  Target INR 2-2.5  Dr Denny to follow INR on discharge if before 1/15/18  [facility]  -- Indication: For A/C  for Afib    insulin lispro 100 units/mL subcutaneous solution  --  subcutaneous 4 times a day (before meals and at bedtime); 2 Unit(s) if Glucose 151 - 200  4 Unit(s) if Glucose 201 - 250  6 Unit(s) if Glucose 251 - 300  8 Unit(s) if Glucose 301 - 350  10 Unit(s) if Glucose 351 - 400  12 Unit(s) if Glucose Greater Than 400  -- Indication: For DM    metFORMIN 500 mg oral tablet  -- 1 tab(s) by mouth 2 times a day  -- Indication: For DM    atorvastatin 80 mg oral tablet  -- 1 tab(s) by mouth once a day (at bedtime)  -- Indication: For statin    carvedilol 3.125 mg oral tablet  -- 1 tab(s) by mouth every 12 hours  -- Indication: For betablocker    albuterol  -- Indication: For Neb    tiotropium 18 mcg inhalation capsule  -- 1 cap(s) inhaled once a day  -- Indication: For COPD (chronic obstructive pulmonary disease)    furosemide 20 mg oral tablet  -- 1 tab(s) by mouth once a day  -- Indication: For diuretic    docusate sodium 100 mg oral capsule  -- 1 cap(s) by mouth 3 times a day  -- Indication: For stool softener    polyethylene glycol 3350 oral powder for reconstitution  -- 17 gram(s) by mouth once a day  -- Indication: For bulking agent    pantoprazole 40 mg oral delayed release tablet  -- 1 tab(s) by mouth once a day (before a meal)  -- Indication: For GERD

## 2017-12-20 NOTE — PROGRESS NOTE ADULT - PROBLEM SELECTOR PLAN 1
cont  coumadin - [facility]
Patient now s/p CABG.  Requiring inotropic support post op.  Will monitor Cardiac output and hemodynamics.
Patient now s/p CABG.  Requiring inotropic support post op.  Will monitor Cardiac output and hemodynamics.  Wean to extubate
start coumadin  f/u INR
Patient now s/p CABG.  Requiring inotropic support post op.  Will monitor Cardiac output and hemodynamics.
congestion on chest xray, elevated BNP  lasix 20mg IV BID  keep euvolemic
lasix 20mg IV BID acute on chronic heart failure  keep euvolemic
s/p C3L  On Aspirin,   On beta blocker and statin
s/p C3L  On Aspirin,   On beta blocker and statin  Discharge to acute rehab when bed available
congestion on chest xray, elevated BNP  lasix 20mg IV BID  keep euvolemic

## 2017-12-20 NOTE — DISCHARGE NOTE ADULT - HOSPITAL COURSE
78 y/o M with a h/o afib on coumadin CAD with prior PCI DM, HLD, HTN, MI PPM CVA COPD current smoker,   presented to [facility] in acute systolic HF and transferred to [facility], preop req vit K for elev INR)    on   C3L, MVR (t), AVR (t), ALLYSON clip       OR- PPM interrogated by EP (original settings VVI 60, set to VVI 80 for OR), given 2u FFP 2u Plt intraop, to ICU on Epi/Levo, transitioning from Epi to   : weanin’ .  Intermittent rapid afib, iv dig 0.5mg  12/15 dcd , d/cd swan. started coumadin, dcd plavix   Primacor weaned off.  Coreg, Lasix, aldactone started   mild hypotension > diuretics held. BUN elevated, albumin x 1 given.

## 2017-12-20 NOTE — PROGRESS NOTE ADULT - PROBLEM SELECTOR PLAN 6
add colace and miralax  no signs of acute abdomen, continue to monitor
add colace and miralax  no signs of acute abdomen, continue to monitor
s/p AVR

## 2017-12-20 NOTE — DISCHARGE NOTE ADULT - CARE PLAN
Principal Discharge DX:	S/P CABG x 3  Goal:	recover from surgery  Instructions for follow-up, activity and diet:	Follow up appointment with Dr Denny when discharged from rehab  Cardiac surgery office second floor at [facility]  Please come to ED or Call Cardio thoracic office at [phone] if Chest pain, Shortness of Breath, persistant Nausea & vomiting, oozing from wounds,palpitations or pain not relieved with medication  Weigh yourself daily>notify surgeons office if  2 lb increase in weight in 24 hours..  Shower daily, no bathing swimming in a pool or the ocean until instructed by MD.    We advise that you do not drive until instructed by MD.   You may not return to work until instructed by MD.   DO NOT APPLY CREAM POWDER LOTION OR OINTMENT TO ANY SURGICAL WOUND>THIS CAN IMPEDE HEALING AND CAUSE AN INFECTION    Please eat a low fat low salt diet.  Wash incisions with soap and water using washcloth in shower daily

## 2017-12-20 NOTE — PROGRESS NOTE ADULT - SUBJECTIVE AND OBJECTIVE BOX
77y Male s/p CABG, with aortic and mitral valve replacement  Clipping of left atrial appendage      Subjective:  Patient complains of incisional pain which is well controlled with PRN medications    T(C): 36.8 (12-15-17 @ 00:00), Max: 36.8 (12-15-17 @ 00:00)  HR: 102 (12-15-17 @ 01:00) (79 - 123)  ABP: 101/43 (12-15-17 @ 01:00) (97/42 - 161/67)  ABP(mean): 59 (12-15-17 @ 01:00) (55 - 99)  RR: 15 (12-15-17 @ 01:00) (11 - 27)  SpO2: 94% (12-15-17 @ 01:00) (90% - 99%)  CVP(mm Hg): 7 (12-15-17 @ 01:00) (2 - 15)  CO: 9.2 (12-15-17 @ 01:00) (4.2 - 9.4)  CI: 4.5 (12-15-17 @ 01:00) (2 - 4.6)  PA: 41/16 (12-15-17 @ 01:00) (26/10 - 53/24)   Mode: CPAP with PS  FiO2: 40  PEEP: 5  PS: 5  MAP: 7        12-14    143  |  108<H>  |  34.0<H>  ----------------------------<  153<H>  4.3   |  20.0<L>  |  1.37<H>    Ca    8.1<L>      14 Dec 2017 03:47  Phos  2.3     12-14  Mg     2.3     12-14    TPro  6.0<L>  /  Alb  3.7  /  TBili  2.4<H>  /  DBili  0.6<H>  /  AST  54<H>  /  ALT  21  /  AlkPhos  55  12-14                               10.7   9.2   )-----------( 92       ( 14 Dec 2017 03:47 )             32.2        PT/INR - ( 14 Dec 2017 03:47 )   PT: 13.2 sec;   INR: 1.20 ratio         PTT - ( 14 Dec 2017 03:47 )  PTT:30.0 sec  ABG - ( 14 Dec 2017 03:37 )  pH: 7.41  /  pCO2: 36    /  pO2: 64    / HCO3: 23    / Base Excess: -1.9  /  SaO2: 91                   CARDIAC MARKERS ( 14 Dec 2017 03:47 )   U/L / CKMB35.0 ng/mL / Troponin T0.51 ng/mL /        CAPILLARY BLOOD GLUCOSE      POCT Blood Glucose.: 196 mg/dL (14 Dec 2017 22:44)  POCT Blood Glucose.: 127 mg/dL (14 Dec 2017 19:13)  POCT Blood Glucose.: 95 mg/dL (14 Dec 2017 16:52)  POCT Blood Glucose.: 127 mg/dL (14 Dec 2017 15:03)  POCT Blood Glucose.: 138 mg/dL (14 Dec 2017 13:31)  POCT Blood Glucose.: 151 mg/dL (14 Dec 2017 12:06)  POCT Blood Glucose.: 131 mg/dL (14 Dec 2017 10:12)  POCT Blood Glucose.: 103 mg/dL (14 Dec 2017 09:14)  POCT Blood Glucose.: 111 mg/dL (14 Dec 2017 08:22)  POCT Blood Glucose.: 135 mg/dL (14 Dec 2017 06:20)  POCT Blood Glucose.: 142 mg/dL (14 Dec 2017 04:25)  POCT Blood Glucose.: 146 mg/dL (14 Dec 2017 03:16)  POCT Blood Glucose.: 159 mg/dL (14 Dec 2017 02:11)           CXR: < from: Xray Chest 1 View AP/PA. (12.14.17 @ 05:31) >  Status post open heart surgery.   No evidence of active chest pathology.    Catheters in place.    < end of copied text >      I&O's Detail    13 Dec 2017 07:01  -  14 Dec 2017 07:00  --------------------------------------------------------  IN:    Albumin 5%  - 250 mL: 750 mL    DOBUTamine Infusion: 113.4 mL    DOBUTamine Infusion: 53.2 mL    EPINEPHrine Infusion: 6 mL    insulin Infusion: 50 mL    Lactated Ringers IV Bolus: 1000 mL    milrinone  Infusion: 37.6 mL    norepinephrine Infusion: 48.6 mL    propofol Infusion: 80 mL    sodium chloride 0.9%.: 70 mL    sodium chloride 0.9%.: 125 mL    Solution: 50 mL    Solution: 250 mL    Solution: 150 mL  Total IN: 2783.8 mL    OUT:    Chest Tube: 520 mL    Chest Tube: 100 mL    Indwelling Catheter - Urethral: 1875 mL  Total OUT: 2495 mL    Total NET: 288.8 mL      14 Dec 2017 07:01  -  15 Dec 2017 01:51  --------------------------------------------------------  IN:    Albumin 5%  - 250 mL: 250 mL    DOBUTamine Infusion: 20 mL    DOBUTamine Infusion: 105 mL    insulin Infusion: 28 mL    milrinone  Infusion: 37.7 mL    milrinone  Infusion: 106.4 mL    norepinephrine Infusion: 33 mL    norepinephrine Infusion: 18 mL    Oral Fluid: 630 mL    sodium chloride 0.9%.: 82.5 mL    sodium chloride 0.9%.: 180 mL    Solution: 100 mL    Solution: 500 mL    Solution: 200 mL  Total IN: 2290.6 mL    OUT:    Chest Tube: 40 mL    Chest Tube: 450 mL    Indwelling Catheter - Urethral: 1055 mL  Total OUT: 1545 mL    Total NET: 745.6 mL          MEDICATIONS  (STANDING):  ALBUTerol/ipratropium for Nebulization 3 milliLiter(s) Nebulizer every 6 hours  amiodarone    Tablet 200 milliGRAM(s) Oral daily  aspirin enteric coated 325 milliGRAM(s) Oral daily  atorvastatin 80 milliGRAM(s) Oral at bedtime  cefuroxime  IVPB 1500 milliGRAM(s) IV Intermittent every 8 hours  clopidogrel Tablet 75 milliGRAM(s) Oral daily  dextrose 50% Injectable 50 milliLiter(s) IV Push every 15 minutes  dextrose 50% Injectable 25 milliLiter(s) IV Push every 15 minutes  DOBUTamine Infusion 2 MICROgram(s)/kG/Min (5.034 mL/Hr) IV Continuous <Continuous>  docusate sodium 100 milliGRAM(s) Oral three times a day  furosemide   Injectable 40 milliGRAM(s) IV Push daily  insulin lispro (HumaLOG) corrective regimen sliding scale   SubCutaneous Before meals and at bedtime  milrinone Infusion 0.25 MICROgram(s)/kG/Min (6.293 mL/Hr) IV Continuous <Continuous>  pantoprazole    Tablet 40 milliGRAM(s) Oral before breakfast  sodium chloride 0.9%. 1000 milliLiter(s) (10 mL/Hr) IV Continuous <Continuous>  sodium chloride 0.9%. 1000 milliLiter(s) (5 mL/Hr) IV Continuous <Continuous>  tiotropium 18 MICROgram(s) Capsule 1 Capsule(s) Inhalation daily  vancomycin  IVPB 1000 milliGRAM(s) IV Intermittent every 12 hours    MEDICATIONS  (PRN):  acetaminophen  IVPB. 1000 milliGRAM(s) IV Intermittent once PRN on request      Physical Exam  Neuro: A+O x 3, non-focal, speech clear and intact  Pulm: CTA, equal bilaterally  CV: irregularlly irregular, +S1S2  Abd: soft, NT, ND  Ext: FREEMAN x 4, no edema  Inc: MSI C/D/I/stable w/ dsg
77y Male s/p CABG, with aortic and mitral valve replacement  Clipping of left atrial appendage      Subjective:  Patient presently intubated, coming off of sedation    T(C): 36.6 (12-13-17 @ 16:30), Max: 36.7 (12-13-17 @ 04:44)  HR: 80 (12-14-17 @ 02:30) (80 - 104)  BP: 104/52 (12-13-17 @ 08:00) (102/50 - 104/52)  ABP: 113/44 (12-14-17 @ 02:30) (110/48 - 148/59)  ABP(mean): 64 (12-14-17 @ 02:30) (61 - 85)  RR: 13 (12-14-17 @ 02:30) (11 - 25)  SpO2: 97% (12-14-17 @ 02:30) (95% - 100%)  CVP(mm Hg): 4 (12-14-17 @ 02:30) (2 - 13)  CO: 4.2 (12-14-17 @ 02:00) (3.7 - 5)  CI: 2 (12-14-17 @ 02:00) (1.8 - 2.4)  PA: 26/10 (12-14-17 @ 02:30) (25/11 - 48/22)   Mode: AC/ CMV (Assist Control/ Continuous Mandatory Ventilation)  RR (machine): 12  TV (machine): 600  FiO2: 40  PEEP: 5  MAP: 9  PIP: 21        12-13    143  |  107  |  34.0<H>  ----------------------------<  166<H>  4.4   |  23.0  |  1.35<H>    Ca    8.5<L>      13 Dec 2017 16:27  Phos  2.1     12-13  Mg     2.7     12-13    TPro  6.0<L>  /  Alb  3.6  /  TBili  2.7<H>  /  DBili  x   /  AST  47<H>  /  ALT  23  /  AlkPhos  65  12-13                               12.0   17.3  )-----------( 185      ( 13 Dec 2017 16:27 )             36.1        PT/INR - ( 13 Dec 2017 16:27 )   PT: 14.7 sec;   INR: 1.33 ratio         PTT - ( 13 Dec 2017 16:27 )  PTT:34.7 sec  ABG - ( 13 Dec 2017 22:17 )  pH: 7.37  /  pCO2: 40    /  pO2: 110   / HCO3: 23    / Base Excess: -1.9  /  SaO2: 96                   CARDIAC MARKERS ( 13 Dec 2017 16:27 )   U/L / CKMB26.0 ng/mL / Troponin T0.82 ng/mL /        CAPILLARY BLOOD GLUCOSE      POCT Blood Glucose.: 159 mg/dL (14 Dec 2017 02:11)  POCT Blood Glucose.: 157 mg/dL (14 Dec 2017 01:25)  POCT Blood Glucose.: 166 mg/dL (14 Dec 2017 00:08)  POCT Blood Glucose.: 163 mg/dL (13 Dec 2017 23:04)  POCT Blood Glucose.: 176 mg/dL (13 Dec 2017 21:59)  POCT Blood Glucose.: 171 mg/dL (13 Dec 2017 21:02)  POCT Blood Glucose.: 172 mg/dL (13 Dec 2017 20:19)  POCT Blood Glucose.: 178 mg/dL (13 Dec 2017 19:01)  POCT Blood Glucose.: 174 mg/dL (13 Dec 2017 17:57)  POCT Blood Glucose.: 141 mg/dL (13 Dec 2017 07:37)           CXR: < from: Xray Chest 1 View AP/PA. (12.13.17 @ 16:08) >  Status post open heart surgery.   No evidence of active chest pathology.    Catheters in place.    < end of copied text >      I&O's Detail    12 Dec 2017 07:01  -  13 Dec 2017 07:00  --------------------------------------------------------  IN:    Oral Fluid: 360 mL  Total IN: 360 mL    OUT:    Voided: 700 mL  Total OUT: 700 mL    Total NET: -340 mL      13 Dec 2017 07:01  -  14 Dec 2017 02:55  --------------------------------------------------------  IN:    Albumin 5%  - 250 mL: 500 mL    DOBUTamine Infusion: 63 mL    DOBUTamine Infusion: 53.2 mL    EPINEPHrine Infusion: 6 mL    insulin Infusion: 30 mL    Lactated Ringers IV Bolus: 1000 mL    norepinephrine Infusion: 48.6 mL    propofol Infusion: 80 mL    sodium chloride 0.9%.: 50 mL    sodium chloride 0.9%.: 85 mL    Solution: 150 mL    Solution: 50 mL    Solution: 250 mL  Total IN: 2365.8 mL    OUT:    Chest Tube: 70 mL    Chest Tube: 350 mL    Indwelling Catheter - Urethral: 1620 mL  Total OUT: 2040 mL    Total NET: 325.8 mL          MEDICATIONS  (STANDING):  albumin human  5% IVPB 250 milliLiter(s) IV Intermittent once  ALBUTerol/ipratropium for Nebulization 3 milliLiter(s) Nebulizer every 6 hours  amiodarone    Tablet 200 milliGRAM(s) Oral daily  aspirin enteric coated 325 milliGRAM(s) Oral daily  atorvastatin 80 milliGRAM(s) Oral at bedtime  cefuroxime  IVPB 1500 milliGRAM(s) IV Intermittent every 8 hours  clopidogrel Tablet 75 milliGRAM(s) Oral daily  dextrose 50% Injectable 50 milliLiter(s) IV Push every 15 minutes  dextrose 50% Injectable 25 milliLiter(s) IV Push every 15 minutes  DOBUTamine Infusion 5 MICROgram(s)/kG/Min (12.585 mL/Hr) IV Continuous <Continuous>  docusate sodium 100 milliGRAM(s) Oral three times a day  insulin Infusion 2 Unit(s)/Hr (2 mL/Hr) IV Continuous <Continuous>  niCARdipine Infusion 5 mG/Hr (25 mL/Hr) IV Continuous <Continuous>  pantoprazole    Tablet 40 milliGRAM(s) Oral before breakfast  propofol Infusion 10 MICROgram(s)/kG/Min (5.034 mL/Hr) IV Continuous <Continuous>  sodium chloride 0.9%. 1000 milliLiter(s) (10 mL/Hr) IV Continuous <Continuous>  sodium chloride 0.9%. 1000 milliLiter(s) (5 mL/Hr) IV Continuous <Continuous>  vancomycin  IVPB 1000 milliGRAM(s) IV Intermittent every 12 hours    MEDICATIONS  (PRN):  acetaminophen  IVPB. 1000 milliGRAM(s) IV Intermittent once PRN on request      Physical Exam  Neuro: Intubated, off sedation opens eyes and nods appropriately to questions, moves all equal  Pulm: CTA, equal bilaterally  CV: RRR, +S1S2  Abd: soft, NT, ND  Ext: FREEMAN x 4, no edema  Inc: MSI C/D/I/stable w/ dsg
Patient seen and examined.  Denies CP, SOB, N/V.  Reports unable to sleep, also denies flatus, but no abdominal pain or distention.      T(C): 36.8 (12-16-17 @ 19:40)  T(F): 98.2 (12-16-17 @ 19:40)  HR: 86 (12-17-17 @ 00:00)  BP: 113/59 (12-16-17 @ 22:00)  BP(mean): 81 (12-16-17 @ 22:00)  ABP: 140/67 (12-17-17 @ 00:00)  ABP(mean): 90 (12-17-17 @ 00:00)  RR: 21 (12-17-17 @ 00:00)  SpO2: 99% (12-17-17 @ 00:00)  Wt(kg): --  CVP(mm Hg): 11 (12-17-17 @ 00:00)  CI: 2.8 (12-17-17 @ 00:00)  PA: --  PA(mean): --  PA(direct): --  SVRI: 2254 (12-17-17 @ 00:00)      Physical Exam:  Gen: A&Ox3  Pulm:  course rhonchi b/l  CV:  S1S2, RRR, no m/r/g  Abd: +BS hypoactive, soft, NT, ND  Ext:  +DP b/l, 3+edema b/l  Incision:  c/d/i no click, no exudate    I&O's Detail    15 Dec 2017 07:01  -  16 Dec 2017 07:00  --------------------------------------------------------  IN:    Albumin 5%  - 250 mL: 250 mL    DOBUTamine Infusion: 7.5 mL    insulin Infusion: 28 mL    milrinone  Infusion: 151.2 mL    norepinephrine Infusion: 54 mL    Oral Fluid: 480 mL    sodium chloride 0.9%.: 240 mL    sodium chloride 0.9%.: 120 mL    Solution: 50 mL    Solution: 250 mL    Solution: 200 mL  Total IN: 1830.7 mL    OUT:    Chest Tube: 230 mL    Chest Tube: 370 mL    Indwelling Catheter - Urethral: 925 mL  Total OUT: 1525 mL    Total NET: 305.7 mL      16 Dec 2017 07:01  -  17 Dec 2017 01:25  --------------------------------------------------------  IN:    milrinone  Infusion: 37.2 mL    sodium chloride 0.9%.: 120 mL    sodium chloride 0.9%.: 85 mL  Total IN: 242.2 mL    OUT:    Chest Tube: 40 mL    Indwelling Catheter - Urethral: 2020 mL  Total OUT: 2060 mL    Total NET: -1817.8 mL                              10.4   11.5  )-----------( 100      ( 16 Dec 2017 04:21 )             31.3   12-16    138  |  102  |  39.0<H>  ----------------------------<  205<H>  4.2   |  22.0  |  1.38<H>    Ca    8.8      16 Dec 2017 18:45  Phos  2.6     12-16  Mg     2.2     12-16    TPro  6.1<L>  /  Alb  3.6  /  TBili  2.1<H>  /  DBili  x   /  AST  26  /  ALT  17  /  AlkPhos  56  12-16  aPTT: 28.4 sec; PT: 12.9 sec; INR: 1.17 ratio  12-16-17 @ 04:21         CAPILLARY BLOOD GLUCOSE      POCT Blood Glucose.: 133 mg/dL (16 Dec 2017 22:08)        Medications:  ALBUTerol/ipratropium for Nebulization 3 milliLiter(s) Nebulizer every 6 hours  amiodarone    Tablet 200 milliGRAM(s) Oral daily  aspirin enteric coated 325 milliGRAM(s) Oral daily  atorvastatin 80 milliGRAM(s) Oral at bedtime  docusate sodium 100 milliGRAM(s) Oral three times a day  enoxaparin Injectable 40 milliGRAM(s) SubCutaneous daily  insulin glargine Injectable (LANTUS) 10 Unit(s) SubCutaneous at bedtime  insulin lispro (HumaLOG) corrective regimen sliding scale   SubCutaneous Before meals and at bedtime  insulin lispro Injectable (HumaLOG) 2 Unit(s) SubCutaneous three times a day before meals  milrinone Infusion 0.25 MICROgram(s)/kG/Min IV Continuous <Continuous>  pantoprazole    Tablet 40 milliGRAM(s) Oral before breakfast  sodium chloride 0.9%. 1000 milliLiter(s) IV Continuous <Continuous>  sodium chloride 0.9%. 1000 milliLiter(s) IV Continuous <Continuous>  tiotropium 18 MICROgram(s) Capsule 1 Capsule(s) Inhalation daily      CXR: Pending    Assessment:     Plan:
Renal :    77y Male s/p CABG, with aortic and mitral valve replacement  Clipping of left atrial appendage    Subjective:  Patient  Now extubated,    T(C): 36.6 (17 @ 16:30), Max: 36.7 (17 @ 04:44)  HR: 80 (17 @ 02:30) (80 - 104)  BP: 104/52 (17 @ 08:00) (102/50 - 104/52)  ABP: 113/44 (17 @ 02:30) (110/48 - 148/59)  ABP(mean): 64 (17 @ 02:30) (61 - 85)  RR: 13 (17 @ 02:30) (11 - 25)  SpO2: 97% (17 @ 02:30) (95% - 100%)    CVP(mm Hg): 4 (17 @ 02:30) (2 - 13)  CO: 4.2 (17 @ 02:00) (3.7 - 5)  CI: 2 (17 @ 02:00) (1.8 - 2.4)  PA: 26/10 (17 @ 02:30) (25/ - 48/)     143    |  108<H>  |  34.0<H>  ----------------------------<  153<H>  Ca:8.1<L> (14 Dec 2017 03:47)  4.3     |  20.0<L>  |  1.37<H>    eGFR if Non : 50 <L>    TPro  6.0 g/dL<L>  /  Alb  3.7 g/dL  /  TBili  2.4 mg/dL<H>  /  DBili  0.6 mg/dL<H>  /  AST  54 U/L<H>  /  ALT  21 U/L  /  AlkPhos  55 U/L  14 Dec 2017 03:47                        10.7<L>  9.2   )-----------( 92<L>    ( 14 Dec 2017 03:47 )             32.2<L>    Phos:2.3 mg/dL<L> M.3 mg/dL PTH:-- Uric acid:-- Serum Osm:--  Ferritin:-- Iron:-- TIBC:-- Tsat:--  B12:-- TSH:-- ( @ 03:47)    UProt:-- UCr:39 mg/dL P/C Ratio:0.1 Ratio 24 hour Prot:-- UVol:-- CrCl:--  Shawna:131 mmol/L UOsm:-- UVol:-- UCl:128 mmol/L UK:-- ( @ 12:50)        143  |  107  |  34.0<H>  ----------------------------<  166<H>  4.4   |  23.0  |  1.35<H>    Ca    8.5<L>      13 Dec 2017 16:27  Phos  2.1       Mg     2.7         TPro  6.0<L>  /  Alb  3.6  /  TBili  2.7<H>  /  DBili  x   /  AST  47<H>  /  ALT  23  /  AlkPhos  65                                 12.0   17.3  )-----------( 185      ( 13 Dec 2017 16:27 )             36.1        PT/INR - ( 13 Dec 2017 16:27 )   PT: 14.7 sec;   INR: 1.33 ratio         PTT - ( 13 Dec 2017 16:27 )  PTT:34.7 sec  ABG - ( 13 Dec 2017 22:17 )  pH: 7.37  /  pCO2: 40    /  pO2: 110   / HCO3: 23    / Base Excess: -1.9  /  SaO2: 96        CARDIAC MARKERS ( 13 Dec 2017 16:27 )   U/L / CKMB26.0 ng/mL / Troponin T0.82 ng/mL /    CAPILLARY BLOOD GLUCOSE    POCT Blood Glucose.: 159 mg/dL (14 Dec 2017 02:11)  POCT Blood Glucose.: 157 mg/dL (14 Dec 2017 01:25)  POCT Blood Glucose.: 166 mg/dL (14 Dec 2017 00:08)  POCT Blood Glucose.: 163 mg/dL (13 Dec 2017 23:04)  POCT Blood Glucose.: 176 mg/dL (13 Dec 2017 21:59)  POCT Blood Glucose.: 171 mg/dL (13 Dec 2017 21:02)  POCT Blood Glucose.: 172 mg/dL (13 Dec 2017 20:19)  POCT Blood Glucose.: 178 mg/dL (13 Dec 2017 19:01)  POCT Blood Glucose.: 174 mg/dL (13 Dec 2017 17:57)  POCT Blood Glucose.: 141 mg/dL (13 Dec 2017 07:37)           Xray Chest 1 View AP/PA. (17 @ 16:08)   Status post open heart surgery.   No evidence of active chest pathology.   Catheters in place.    I&O's Detail    12 Dec 2017 07:01  -  13 Dec 2017 07:00  --------------------------------------------------------  IN:    Oral Fluid: 360 mL  Total IN: 360 mL    OUT:    Voided: 700 mL  Total OUT: 700 mL    Total NET: -340 mL      13 Dec 2017 07:01  -  14 Dec 2017 02:55  --------------------------------------------------------  IN:    Albumin 5%  - 250 mL: 500 mL    DOBUTamine Infusion: 63 mL    DOBUTamine Infusion: 53.2 mL    EPINEPHrine Infusion: 6 mL    insulin Infusion: 30 mL    Lactated Ringers IV Bolus: 1000 mL    norepinephrine Infusion: 48.6 mL    propofol Infusion: 80 mL    sodium chloride 0.9%.: 50 mL    sodium chloride 0.9%.: 85 mL    Solution: 150 mL    Solution: 50 mL    Solution: 250 mL  Total IN: 2365.8 mL    OUT:    Chest Tube: 70 mL    Chest Tube: 350 mL    Indwelling Catheter - Urethral: 1620 mL  Total OUT: 2040 mL    Total NET: 325.8 mL    MEDICATIONS  (STANDING):  albumin human  5% IVPB 250 milliLiter(s) IV Intermittent once  ALBUTerol/ipratropium for Nebulization 3 milliLiter(s) Nebulizer every 6 hours  amiodarone    Tablet 200 milliGRAM(s) Oral daily  aspirin enteric coated 325 milliGRAM(s) Oral daily  atorvastatin 80 milliGRAM(s) Oral at bedtime  cefuroxime  IVPB 1500 milliGRAM(s) IV Intermittent every 8 hours  clopidogrel Tablet 75 milliGRAM(s) Oral daily  dextrose 50% Injectable 50 milliLiter(s) IV Push every 15 minutes  dextrose 50% Injectable 25 milliLiter(s) IV Push every 15 minutes  DOBUTamine Infusion 5 MICROgram(s)/kG/Min (12.585 mL/Hr) IV Continuous <Continuous>  docusate sodium 100 milliGRAM(s) Oral three times a day  insulin Infusion 2 Unit(s)/Hr (2 mL/Hr) IV Continuous <Continuous>  niCARdipine Infusion 5 mG/Hr (25 mL/Hr) IV Continuous <Continuous>  pantoprazole    Tablet 40 milliGRAM(s) Oral before breakfast  propofol Infusion 10 MICROgram(s)/kG/Min (5.034 mL/Hr) IV Continuous <Continuous>  sodium chloride 0.9%. 1000 milliLiter(s) (10 mL/Hr) IV Continuous <Continuous>  sodium chloride 0.9%. 1000 milliLiter(s) (5 mL/Hr) IV Continuous <Continuous>  vancomycin  IVPB 1000 milliGRAM(s) IV Intermittent every 12 hours    Physical Exam  Neuro: Intubated, off sedation opens eyes and nods appropriately to questions, moves all equal  Pulm: CTA, equal bilaterally  CV: RRR, +S1S2  Abd: soft, NT, ND  Ext: FREEMAN x 4, no edema  Inc: MSI C/D/I/stable w/ dsg        Assessment and Plan:     Patient is a 77 male with PMH of Afib, CAD s/p PCI, systolic CHF, DM, HTN, MI, PPM, CVA, COPD and current smoker.  Patient now POD#1 s/p C3L MVR, AVR and atrial appendage clip,    CKD -3 , stable post - Surgery,    Problem/Plan - 1: Per CTS,  ·  Problem: Coronary artery disease involving native coronary artery of native heart without angina pectoris.      Plan: Patient now s/p CABG.  Requiring inotropic support post op.   Monitor Cardiac output and hemodynamics.      Problem/Plan - 2: per CTS  ·  Problem: Acute on chronic combined systolic and diastolic congestive heart failure.      Plan: Continue inotropic support.     Problem/Plan - 3: Per CTS,  ·  Problem: Hypertension.      Plan: Maintain SBP < 130 mm.,      Problem/Plan - 4: Per CTS,  ·  Problem: Atrial fibrillation.      Plan: S/p Appendage clip.  Paced ( PPM )
[facility] DIVISION OF KIDNEY DISEASES AND HYPERTENSION -- FOLLOW UP NOTE  --------------------------------------------------------------------------------  Chief Complaint:  BENIGNO    24 hour events/subjective:  Pt seen and examined  Son and wife at bedside  No complaints  Appears well      PAST HISTORY  --------------------------------------------------------------------------------  No significant changes to PMH, PSH, FHx, SHx, unless otherwise noted    ALLERGIES & MEDICATIONS  --------------------------------------------------------------------------------  Allergies    penicillin (Anaphylaxis)    Intolerances    [facility] PT (Unknown)    Standing Inpatient Medications  amiodarone    Tablet 200 milliGRAM(s) Oral daily  aspirin enteric coated 325 milliGRAM(s) Oral daily  atorvastatin 80 milliGRAM(s) Oral at bedtime  carvedilol 3.125 milliGRAM(s) Oral every 12 hours  docusate sodium 100 milliGRAM(s) Oral three times a day  enoxaparin Injectable 40 milliGRAM(s) SubCutaneous daily  furosemide    Tablet 40 milliGRAM(s) Oral daily  insulin glargine Injectable (LANTUS) 10 Unit(s) SubCutaneous at bedtime  insulin lispro (HumaLOG) corrective regimen sliding scale   SubCutaneous Before meals and at bedtime  insulin lispro Injectable (HumaLOG) 2 Unit(s) SubCutaneous three times a day before meals  pantoprazole    Tablet 40 milliGRAM(s) Oral before breakfast  polyethylene glycol 3350 17 Gram(s) Oral daily  spironolactone 25 milliGRAM(s) Oral daily  tiotropium 18 MICROgram(s) Capsule 1 Capsule(s) Inhalation daily    PRN Inpatient Medications  ALBUTerol/ipratropium for Nebulization 3 milliLiter(s) Nebulizer every 6 hours PRN      REVIEW OF SYSTEMS  --------------------------------------------------------------------------------  Gen: No weight changes, fatigue, fevers/chills, weakness  Skin: No rashes  Head/Eyes/Ears/Mouth: No headache; Normal hearing; Normal vision w/o blurriness; No sinus pain/discomfort, sore throat  Respiratory: No dyspnea, cough, wheezing, hemoptysis  CV: No chest pain, PND, orthopnea  GI: No abdominal pain, diarrhea, constipation, nausea, vomiting, melena, hematochezia  : No increased frequency, dysuria, hematuria, nocturia  MSK: No joint pain/swelling; no back pain; no edema  Neuro: No dizziness/lightheadedness, weakness, seizures, numbness, tingling  Heme: No easy bruising or bleeding  Endo: No heat/cold intolerance  Psych: No significant nervousness, anxiety, stress, depression    All other systems were reviewed and are negative, except as noted.    VITALS/PHYSICAL EXAM  --------------------------------------------------------------------------------  T(C): 36.7 (12-18-17 @ 10:00), Max: 36.7 (12-18-17 @ 10:00)  HR: 83 (12-18-17 @ 10:00) (80 - 96)  BP: 107/76 (12-18-17 @ 10:00) (97/58 - 120/79)  RR: 20 (12-18-17 @ 10:00) (20 - 26)  SpO2: 100% (12-18-17 @ 08:31) (94% - 100%)  Wt(kg): --        12-17-17 @ 07:01  -  12-18-17 @ 07:00  --------------------------------------------------------  IN: 0 mL / OUT: 325 mL / NET: -325 mL    12-18-17 @ 07:01  -  12-18-17 @ 13:49  --------------------------------------------------------  IN: 240 mL / OUT: 300 mL / NET: -60 mL      Physical Exam:  	Gen: NAD, well-appearing  	HEENT: PERRL, supple neck, clear oropharynx  	Pulm: CTA B/L  	CV: RRR, S1S2; no rub  	Back: No spinal or CVA tenderness; no sacral edema  	Abd: +BS, soft, nontender/nondistended  	: No suprapubic tenderness  	UE: Warm, FROM, no clubbing, intact strength; no edema; no asterixis  	LE: Warm, FROM, no clubbing, intact strength; no edema  	Neuro: No focal deficits, intact gait  	Psych: Normal affect and mood  	Skin: Warm, without rashes    LABS/STUDIES  --------------------------------------------------------------------------------              11.9   13.0  >-----------<  146      [12-18-17 @ 05:56]              35.6     143  |  103  |  42.0  ----------------------------<  117      [12-18-17 @ 05:56]  4.5   |  27.0  |  1.29        Ca     9.0     [12-18-17 @ 05:56]      Mg     2.3     [12-18-17 @ 05:56]      Phos  2.6     [12-18-17 @ 05:56]      PT/INR: PT 14.0 , INR 1.27       [12-18-17 @ 05:56]  PTT: 26.6       [12-17-17 @ 04:47]      Creatinine Trend:  SCr 1.29 [12-18 @ 05:56]  SCr 1.31 [12-17 @ 04:47]  SCr 1.38 [12-16 @ 18:45]  SCr 1.31 [12-16 @ 04:21]  SCr 1.63 [12-15 @ 02:36]    Urinalysis - [12-09-17 @ 12:03]      Color Yellow / Appearance Clear / SG 1.010 / pH 6.0      Gluc Negative / Ketone Negative  / Bili Negative / Urobili Negative       Blood Negative / Protein Negative / Leuk Est Negative / Nitrite Negative      RBC  / WBC 0-2 / Hyaline  / Gran  / Sq Epi  / Non Sq Epi  / Bacteria Few      PTH -- (Ca 9.1)      [12-11-17 @ 18:53]   70  PTH -- (Ca 9.2)      [12-10-17 @ 15:41]   66  Vitamin D (25OH) 38.8      [12-11-17 @ 18:43]  HbA1c 6.2      [12-06-17 @ 14:29]  TSH 5.82      [12-08-17 @ 13:36]
77y Male s/p CABG, with aortic and mitral valve replacement  Clipping of left atrial appendage      Subjective:  Patient has no acute complaints    T(C): 37.8 (12-15-17 @ 22:00), Max: 37.8 (12-15-17 @ 22:00)  HR: 88 (12-16-17 @ 01:00) (87 - 117)  BP: 132/60 (12-16-17 @ 01:00) (83/55 - 143/67)  ABP: 141/51 (12-16-17 @ 01:00) (96/45 - 162/62)  ABP(mean): 73 (12-16-17 @ 01:00) (59 - 101)  RR: 14 (12-16-17 @ 01:00) (9 - 32)  SpO2: 97% (12-16-17 @ 01:00) (88% - 97%)  CVP(mm Hg): 4 (12-16-17 @ 01:00) (-4 - 42)  CO: 7.3 (12-16-17 @ 01:00) (5.3 - 8.7)  CI: 3.6 (12-16-17 @ 01:00) (2.6 - 4.3)  PA: -1/-1 (12-15-17 @ 10:30) (-1/-1 - 44/23)         12-15    141  |  105  |  38.0<H>  ----------------------------<  214<H>  4.7   |  20.0<L>  |  1.63<H>    Ca    8.1<L>      15 Dec 2017 02:36  Phos  3.1     12-15  Mg     2.1     12-15    TPro  6.0<L>  /  Alb  3.7  /  TBili  2.4<H>  /  DBili  0.6<H>  /  AST  54<H>  /  ALT  21  /  AlkPhos  55  12-14                               10.0   15.5  )-----------( 108      ( 15 Dec 2017 02:36 )             29.9        PT/INR - ( 14 Dec 2017 03:47 )   PT: 13.2 sec;   INR: 1.20 ratio         PTT - ( 14 Dec 2017 03:47 )  PTT:30.0 sec  ABG - ( 14 Dec 2017 03:37 )  pH: 7.41  /  pCO2: 36    /  pO2: 64    / HCO3: 23    / Base Excess: -1.9  /  SaO2: 91                         CAPILLARY BLOOD GLUCOSE      POCT Blood Glucose.: 144 mg/dL (15 Dec 2017 23:44)  POCT Blood Glucose.: 141 mg/dL (15 Dec 2017 22:21)  POCT Blood Glucose.: 110 mg/dL (15 Dec 2017 20:40)  POCT Blood Glucose.: 96 mg/dL (15 Dec 2017 19:50)  POCT Blood Glucose.: 118 mg/dL (15 Dec 2017 17:47)  POCT Blood Glucose.: 117 mg/dL (15 Dec 2017 15:55)  POCT Blood Glucose.: 144 mg/dL (15 Dec 2017 13:58)  POCT Blood Glucose.: 152 mg/dL (15 Dec 2017 11:50)  POCT Blood Glucose.: 161 mg/dL (15 Dec 2017 09:54)  POCT Blood Glucose.: 171 mg/dL (15 Dec 2017 09:02)  POCT Blood Glucose.: 180 mg/dL (15 Dec 2017 08:14)  POCT Blood Glucose.: 192 mg/dL (15 Dec 2017 06:36)  POCT Blood Glucose.: 204 mg/dL (15 Dec 2017 05:17)           CXR:   < from: Xray Chest 1 View AP/PA. (12.15.17 @ 05:14) >  Development of right pleural effusion, otherwise stable.    < end of copied text >    I&O's Detail    14 Dec 2017 07:01  -  15 Dec 2017 07:00  --------------------------------------------------------  IN:    Albumin 5%  - 250 mL: 500 mL    DOBUTamine Infusion: 50 mL    DOBUTamine Infusion: 105 mL    insulin Infusion: 28 mL    milrinone  Infusion: 75.5 mL    milrinone  Infusion: 106.4 mL    norepinephrine Infusion: 18 mL    norepinephrine Infusion: 33 mL    Oral Fluid: 630 mL    sodium chloride 0.9%.: 112.5 mL    sodium chloride 0.9%.: 240 mL    Solution: 100 mL    Solution: 500 mL    Solution: 200 mL  Total IN: 2698.4 mL    OUT:    Chest Tube: 70 mL    Chest Tube: 630 mL    Indwelling Catheter - Urethral: 1265 mL  Total OUT: 1965 mL    Total NET: 733.4 mL      15 Dec 2017 07:01  -  16 Dec 2017 01:18  --------------------------------------------------------  IN:    Albumin 5%  - 250 mL: 250 mL    DOBUTamine Infusion: 7.5 mL    insulin Infusion: 28 mL    milrinone  Infusion: 113.4 mL    norepinephrine Infusion: 54 mL    Oral Fluid: 480 mL    sodium chloride 0.9%.: 180 mL    sodium chloride 0.9%.: 90 mL    Solution: 50 mL    Solution: 250 mL    Solution: 200 mL  Total IN: 1702.9 mL    OUT:    Chest Tube: 300 mL    Chest Tube: 110 mL    Indwelling Catheter - Urethral: 560 mL  Total OUT: 970 mL    Total NET: 732.9 mL          MEDICATIONS  (STANDING):  ALBUTerol/ipratropium for Nebulization 3 milliLiter(s) Nebulizer every 6 hours  amiodarone    Tablet 200 milliGRAM(s) Oral daily  aspirin enteric coated 325 milliGRAM(s) Oral daily  atorvastatin 80 milliGRAM(s) Oral at bedtime  docusate sodium 100 milliGRAM(s) Oral three times a day  enoxaparin Injectable 40 milliGRAM(s) SubCutaneous daily  insulin glargine Injectable (LANTUS) 10 Unit(s) SubCutaneous at bedtime  insulin lispro Injectable (HumaLOG) 2 Unit(s) SubCutaneous three times a day before meals  milrinone Infusion 0.25 MICROgram(s)/kG/Min (6.293 mL/Hr) IV Continuous <Continuous>  pantoprazole    Tablet 40 milliGRAM(s) Oral before breakfast  sodium chloride 0.9%. 1000 milliLiter(s) (10 mL/Hr) IV Continuous <Continuous>  sodium chloride 0.9%. 1000 milliLiter(s) (5 mL/Hr) IV Continuous <Continuous>  tiotropium 18 MICROgram(s) Capsule 1 Capsule(s) Inhalation daily    MEDICATIONS  (PRN):      Physical Exam  Neuro: A+O x 3, non-focal, speech clear and intact  Pulm: CTA, equal bilaterally  CV: irregularly irregular, +S1S2  Abd: soft, NT, ND  Ext: FREEMAN x 4, no edema  Inc: MSI C/D/I/stable w/ dsg
Cardiac Surgery Pre-op Note:    CC: Patient is a 77y old  Male who presents with a chief complaint of Transfer from [facility] with acute CHF exacerbation preop for CABG next week. (08 Dec 2017 17:03)      Referring Physician: [facility], Chaz Gloria MD                                                                                                           Surgeon: Dr Denny    Procedure: 12/13 CABG/AVR/MVR    Allergies    penicillin (Anaphylaxis)    Intolerances        HPI:  Patient Stalin Ronquillo is a 77 year old male with significant past medical history of atrial fibrillation on Amiodarone and Coumadin, CHF on Coreg and Lisinopril, COPD on Spiriva, DM II on Metformin, HTN, Medtronic PPM, CVA, and PCI,  known to cardiac surgery service as a preoperative patient for CABG, was brought in by EMS to [facility] 12/8/17 with complaints of gradual onset of shortness of breath and waxing and waning 8/10 chest tightness. In ER, patient was treated with sublingual NTG and BIPAP, found to have elevated BNP, negative cardiac enzymes, and supra therapeutic INR (3.97). Patient was transferred to [facility] in preparation of CABG and preoperative medical optimization. On admission, he is asymptomatic with stable vital signs. (08 Dec 2017 17:03)      PAST MEDICAL & SURGICAL HISTORY:  Hypertension  Stroke  CHF (congestive heart failure)  Stented coronary artery  MI, old  Asthma  Atrial fibrillation  Diabetes  Bronchitis  Hyperlipidemia  Pacemaker  COPD (chronic obstructive pulmonary disease)  Cardiac arrhythmia  H/O hernia repair  Cardiac pacemaker      MEDICATIONS  (STANDING):  ALBUTerol    90 MICROgram(s) HFA Inhaler 1 Puff(s) Inhalation every 4 hours  amiodarone    Tablet 200 milliGRAM(s) Oral daily  aspirin  chewable 81 milliGRAM(s) Oral daily  atorvastatin 80 milliGRAM(s) Oral at bedtime  buDESOnide  80 MICROgram(s)/formoterol 4.5 MICROgram(s) Inhaler 2 Puff(s) Inhalation two times a day  carvedilol 6.25 milliGRAM(s) Oral every 12 hours  chlorhexidine 0.12% Liquid 15 milliLiter(s) Swish and Spit two times a day  chlorhexidine 4% Liquid 1 Application(s) Topical two times a day  docusate sodium 100 milliGRAM(s) Oral three times a day  furosemide   Injectable 20 milliGRAM(s) IV Push two times a day  insulin lispro (HumaLOG) corrective regimen sliding scale   SubCutaneous Before meals and at bedtime  mupirocin 2% Ointment 1 Application(s) Topical two times a day  sodium chloride 0.9% lock flush 3 milliLiter(s) IV Push every 8 hours  spironolactone 25 milliGRAM(s) Oral daily  tiotropium 18 MICROgram(s) Capsule 1 Capsule(s) Inhalation daily    MEDICATIONS  (PRN):  ALBUTerol    0.083% 2.5 milliGRAM(s) Nebulizer every 6 hours PRN Shortness of Breath and/or Wheezing        Labs:                        15.5   5.7   )-----------( 123      ( 12 Dec 2017 05:51 )             46.3     12-12    142  |  102  |  47.0<H>  ----------------------------<  112  4.4   |  26.0  |  1.93<H>    Ca    8.8      12 Dec 2017 05:51    TPro  7.0  /  Alb  3.8  /  TBili  1.9  /  DBili  x   /  AST  28  /  ALT  26  /  AlkPhos  88  12-12    PT/INR - ( 12 Dec 2017 08:40 )   PT: 19.9 sec;   INR: 1.79 ratio             Blood Type:   HGB A1C:  6.2  Prealbumin: 21  Pro-BNP: Serum Pro-Brain Natriuretic Peptide: 5334 pg/mL (12-08 @ 13:36)    Thyroid Panel: 12-08 @ 13:36/5.82  --/7.1/--  12-06 @ 14:29/6.50  --/7.8/81    MRSA:  / MSSA:  NEG / POS (on bactroban)      CXR:   < from: Xray Chest 1 View AP/PA. (12.11.17 @ 05:37) >    Comparisons:  Chest x-ray dated 12/10/2017 and chest x-ray dated 12/6/2017    Findings: Numerous small  calcified granulomas in the lower lungs.. No   change in pacemaker wire position in right ventricle. There are no acute   infiltrates, congestion or pleural effusions. The pulmonary vasculature   and aorta are normal for age. Heart size is unremarkable. The thorax is   normal for age.    Impression: Old granulomatous disease. No interval change. Pacemaker      < end of copied text >    EKG: < from: 12 Lead ECG (12.10.17 @ 07:47) >    Diagnosis Line Atrial flutter with variable A-V block  Left axis deviation  Septal infarct , age undetermined  Abnormal ECG    < end of copied text >      Carotid Duplex:     < from: US Duplex Carotid Arteries Complete, Bilateral (12.09.17 @ 11:45) >    IMPRESSION:    1.  Right carotid artery: Approximately 50-69% stenosis in the internal   carotid artery.   2.  Left carotid artery: Approximately 50-69% stenosis in the left   internal carotid artery.  3.  Vertebral arteries: Antegrade flow.    < end of copied text >    PFT's:  FVC 1.77 (52%), FEV1 1.12 (43%)    Echocardiogram: < from: TTE Echo Complete w/Doppler (12.08.17 @ 14:12) >       Summary:   1. Left ventricular ejection fraction, by visual estimation, is 25 to   30%.   2. Severely decreased global left ventricular systolic function.   3. LV Ejection Fraction by Cadena's Method.   4. The left ventricular diastolic function could not be assessed in this   study.   5. Moderately dilated right atrium.   6. The mitral valve leaflets are tethered which is due to reduced   systolic function and elevated LVDP.   7. Moderate aortic regurgitation.   8. Endocardial visualization was enhanced with intravenous echo contrast.   9. No evidence of aortic stenosis.  10. Severely enlarged left atrium.  11. Severe mitral valve regurgitation.     MD Kaleb Electronically signed on 12/8/2017 at 5:56:25 PM    < end of copied text >      Cardiac catheterization: at outside hospital    Vein Mapping: done by MARJORIE Charles    Gen: WN/WD NAD  Neuro: AAOx3, nonfocal  Pulm: CTA B/L  CV: irregularly irregular  Abd: Soft, NT, ND +BS  Ext: No edema, + peripheral pulses      Pt has AICD/PPM [ ] Yes  [ x] No             Brand Name:  Pre-op Beta Blocker ordered within 24 hrs of surgery (CABG ONLY)?  [x ] Yes  [ ] No  If not, Why?  Type & Cross  [x ] Yes  [ ] No  NPO after Midnight [ x] Yes  [ ] No  Pre-op ABX ordered, to be taped on chart:  [x ] Yes  [ ] No     Hibiclens/Peridex ordered [x ] Yes  [ ] No  Intraop on Hold: PRBCs, CXR, JÚNIOR [x ]   Consent obtained  [ ] Yes  [ ] No
Device : Medtronic [id number] implanted by Dr. Chaz Gloria on 2/27/15      Battery status: 7.5 years longevity            Magnet Rate: 85 ppm           Voltage: 2.76 V                             Underlying rhythm: Afib  Programmed: VVI 50 ppm  Dependent: No    Leads:  RV    Sensin.4mV    Sensitivity 536mV    Impedance: 560 ohms    Threshold:  0.625V @ 0.4ms    Pacing Output: 2.0V @ 0.4ms    Changes made: At the request of CTS asked to increase LRL from 50 ppm to 80ppm     Conclusion: Normal device function. Would reduce pacing back to original parameters at the conclusion of the operation.
[facility] DIVISION OF KIDNEY DISEASES AND HYPERTENSION -- FOLLOW UP NOTE  --------------------------------------------------------------------------------  Chief Complaint: BENIGNO    24 hour events/subjective:  Pt seen and examined  No complaints  Sitting up in bed in NAD      PAST HISTORY  --------------------------------------------------------------------------------  No significant changes to PMH, PSH, FHx, SHx, unless otherwise noted    ALLERGIES & MEDICATIONS  --------------------------------------------------------------------------------  Allergies    penicillin (Anaphylaxis)    Intolerances    [facility] PT (Unknown)    Standing Inpatient Medications  amiodarone    Tablet 200 milliGRAM(s) Oral daily  aspirin enteric coated 325 milliGRAM(s) Oral daily  atorvastatin 80 milliGRAM(s) Oral at bedtime  carvedilol 3.125 milliGRAM(s) Oral every 12 hours  docusate sodium 100 milliGRAM(s) Oral three times a day  enoxaparin Injectable 40 milliGRAM(s) SubCutaneous daily  furosemide    Tablet 40 milliGRAM(s) Oral daily  insulin glargine Injectable (LANTUS) 10 Unit(s) SubCutaneous at bedtime  insulin lispro (HumaLOG) corrective regimen sliding scale   SubCutaneous Before meals and at bedtime  insulin lispro Injectable (HumaLOG) 2 Unit(s) SubCutaneous three times a day before meals  pantoprazole    Tablet 40 milliGRAM(s) Oral before breakfast  polyethylene glycol 3350 17 Gram(s) Oral daily  sodium chloride 0.9%. 1000 milliLiter(s) IV Continuous <Continuous>  sodium chloride 0.9%. 1000 milliLiter(s) IV Continuous <Continuous>  spironolactone 25 milliGRAM(s) Oral daily  tiotropium 18 MICROgram(s) Capsule 1 Capsule(s) Inhalation daily  warfarin 2.5 milliGRAM(s) Oral once    PRN Inpatient Medications  ALBUTerol/ipratropium for Nebulization 3 milliLiter(s) Nebulizer every 6 hours PRN      REVIEW OF SYSTEMS  --------------------------------------------------------------------------------  Gen: No weight changes, fatigue, fevers/chills, weakness  Skin: No rashes  Head/Eyes/Ears/Mouth: No headache; Normal hearing; Normal vision w/o blurriness; No sinus pain/discomfort, sore throat  Respiratory: No dyspnea, cough, wheezing, hemoptysis  CV: No chest pain, PND, orthopnea  GI: No abdominal pain, diarrhea, constipation, nausea, vomiting, melena, hematochezia  : No increased frequency, dysuria, hematuria, nocturia  MSK: No joint pain/swelling; no back pain; no edema  Neuro: No dizziness/lightheadedness, weakness, seizures, numbness, tingling  Heme: No easy bruising or bleeding  Endo: No heat/cold intolerance  Psych: No significant nervousness, anxiety, stress, depression    All other systems were reviewed and are negative, except as noted.    VITALS/PHYSICAL EXAM  --------------------------------------------------------------------------------  T(C): 36.9 (12-17-17 @ 06:00), Max: 36.9 (12-17-17 @ 06:00)  HR: 87 (12-17-17 @ 12:00) (84 - 96)  BP: 103/73 (12-17-17 @ 12:00) (103/73 - 135/58)  RR: 25 (12-17-17 @ 12:00) (19 - 33)  SpO2: 98% (12-17-17 @ 12:00) (94% - 99%)  Wt(kg): --        12-16-17 @ 07:01  -  12-17-17 @ 07:00  --------------------------------------------------------  IN: 272.2 mL / OUT: 2400 mL / NET: -2127.8 mL    12-17-17 @ 07:01  -  12-17-17 @ 12:36  --------------------------------------------------------  IN: 0 mL / OUT: 50 mL / NET: -50 mL      Physical Exam:  	Gen: NAD, well-appearing  	HEENT: PERRL, supple neck, clear oropharynx  	Pulm: CTA B/L  	CV: RRR, S1S2; no rub  	Back: No spinal or CVA tenderness; no sacral edema  	Abd: +BS, soft, nontender/nondistended  	: No suprapubic tenderness  	UE: Warm, FROM, no clubbing, intact strength; no edema; no asterixis  	LE: Warm, FROM, no clubbing, intact strength; no edema  	Neuro: No focal deficits, intact gait  	Psych: Normal affect and mood  	Skin: Warm, without rashes  	Vascular access:    LABS/STUDIES  --------------------------------------------------------------------------------              11.3   14.2  >-----------<  127      [12-17-17 @ 04:47]              33.8     140  |  104  |  40.0  ----------------------------<  140      [12-17-17 @ 04:47]  4.3   |  22.0  |  1.31        Ca     8.7     [12-17-17 @ 04:47]      Mg     2.3     [12-17-17 @ 04:47]      Phos  1.9     [12-17-17 @ 04:47]    TPro  6.1  /  Alb  3.6  /  TBili  2.1  /  DBili  x   /  AST  26  /  ALT  17  /  AlkPhos  56  [12-16-17 @ 04:21]    PT/INR: PT 13.6 , INR 1.23       [12-17-17 @ 04:47]  PTT: 26.6       [12-17-17 @ 04:47]      Creatinine Trend:  SCr 1.31 [12-17 @ 04:47]  SCr 1.38 [12-16 @ 18:45]  SCr 1.31 [12-16 @ 04:21]  SCr 1.63 [12-15 @ 02:36]  SCr 1.37 [12-14 @ 03:47]    Urinalysis - [12-09-17 @ 12:03]      Color Yellow / Appearance Clear / SG 1.010 / pH 6.0      Gluc Negative / Ketone Negative  / Bili Negative / Urobili Negative       Blood Negative / Protein Negative / Leuk Est Negative / Nitrite Negative      RBC  / WBC 0-2 / Hyaline  / Gran  / Sq Epi  / Non Sq Epi  / Bacteria Few      PTH -- (Ca 9.1)      [12-11-17 @ 18:53]   70  PTH -- (Ca 9.2)      [12-10-17 @ 15:41]   66  Vitamin D (25OH) 38.8      [12-11-17 @ 18:43]  HbA1c 6.2      [12-06-17 @ 14:29]  TSH 5.82      [12-08-17 @ 13:36]
INTERVAL HPI/OVERNIGHT EVENTS:  pt doing well  overall - to get transferred to acute rehab   appetite is good- eatingwell   MEDICATIONS  (STANDING):  amiodarone    Tablet 200 milliGRAM(s) Oral daily  aspirin enteric coated 325 milliGRAM(s) Oral daily  atorvastatin 80 milliGRAM(s) Oral at bedtime  carvedilol 3.125 milliGRAM(s) Oral every 12 hours  docusate sodium 100 milliGRAM(s) Oral three times a day  enoxaparin Injectable 40 milliGRAM(s) SubCutaneous daily  furosemide    Tablet 40 milliGRAM(s) Oral daily  insulin lispro (HumaLOG) corrective regimen sliding scale   SubCutaneous Before meals and at bedtime  metFORMIN 500 milliGRAM(s) Oral two times a day with meals  pantoprazole    Tablet 40 milliGRAM(s) Oral before breakfast  polyethylene glycol 3350 17 Gram(s) Oral daily  silver sulfADIAZINE 1% Cream 1 Application(s) Topical two times a day  spironolactone 25 milliGRAM(s) Oral daily  tiotropium 18 MICROgram(s) Capsule 1 Capsule(s) Inhalation daily  warfarin 2.5 milliGRAM(s) Oral once    MEDICATIONS  (PRN):  ALBUTerol/ipratropium for Nebulization 3 milliLiter(s) Nebulizer every 6 hours PRN SOB/wheezing      Allergies    penicillin (Anaphylaxis)    Intolerances    [facility] PT (Unknown)      Review of systems:    Vital Signs Last 24 Hrs  T(C): 36.4 (18 Dec 2017 15:51), Max: 36.7 (18 Dec 2017 10:00)  T(F): 97.6 (18 Dec 2017 15:51), Max: 98 (18 Dec 2017 10:00)  HR: 88 (18 Dec 2017 17:41) (80 - 88)  BP: 112/60 (18 Dec 2017 17:41) (97/66 - 112/60)  BP(mean): --  RR: 18 (18 Dec 2017 15:51) (18 - 20)  SpO2: 100% (18 Dec 2017 08:31) (95% - 100%)    PHYSICAL EXAM:      Constitutional: NAD, well-groomed, well-developed  HEENT: PERRLA, EOMI, no exophalmos  Neck: No LAD, No JVD, trachea midline, no thyroid enlargement  Back: Normal spine flexure, No CVA tenderness  Respiratory: CTAB  Cardiovascular: S1 and S2, RRR, no M/G/R  Gastrointestinal: BS+, soft, no organomeglag or mass  Extremities: No peripheral edema, no pedal lesions  Skin: No rashes, no acanthosis        LABS:                        11.9   13.0  )-----------( 146      ( 18 Dec 2017 05:56 )             35.6     12-18    143  |  103  |  42.0<H>  ----------------------------<  117<H>  4.5   |  27.0  |  1.29    Ca    9.0      18 Dec 2017 05:56  Phos  2.6     12-18  Mg     2.3     12-18            CAPILLARY BLOOD GLUCOSE    CAPILLARY BLOOD GLUCOSE      POCT Blood Glucose.: 159 mg/dL (18 Dec 2017 17:09)  POCT Blood Glucose.: 200 mg/dL (18 Dec 2017 11:37)  POCT Blood Glucose.: 129 mg/dL (18 Dec 2017 07:25)  POCT Blood Glucose.: 127 mg/dL (17 Dec 2017 21:47)    RADIOLOGY & ADDITIONAL TESTS:
INTERVAL HPI/OVERNIGHT EVENTS: f/u diabetes. doing better. wife at bedside    MEDICATIONS  (STANDING):  ALBUTerol/ipratropium for Nebulization 3 milliLiter(s) Nebulizer every 6 hours  amiodarone    Tablet 200 milliGRAM(s) Oral daily  aspirin enteric coated 325 milliGRAM(s) Oral daily  atorvastatin 80 milliGRAM(s) Oral at bedtime  docusate sodium 100 milliGRAM(s) Oral three times a day  enoxaparin Injectable 40 milliGRAM(s) SubCutaneous daily  insulin glargine Injectable (LANTUS) 10 Unit(s) SubCutaneous at bedtime  insulin lispro Injectable (HumaLOG) 2 Unit(s) SubCutaneous three times a day before meals  milrinone Infusion 0.25 MICROgram(s)/kG/Min (6.293 mL/Hr) IV Continuous <Continuous>  pantoprazole    Tablet 40 milliGRAM(s) Oral before breakfast  sodium chloride 0.9%. 1000 milliLiter(s) (10 mL/Hr) IV Continuous <Continuous>  sodium chloride 0.9%. 1000 milliLiter(s) (5 mL/Hr) IV Continuous <Continuous>  tiotropium 18 MICROgram(s) Capsule 1 Capsule(s) Inhalation daily  warfarin 2.5 milliGRAM(s) Oral once    Allergies  penicillin (Anaphylaxis)    Vital Signs Last 24 Hrs  T(C): 36.6 (16 Dec 2017 15:51), Max: 37.8 (15 Dec 2017 22:00)  T(F): 97.9 (16 Dec 2017 15:51), Max: 100 (15 Dec 2017 22:00)  HR: 89 (16 Dec 2017 15:00) (86 - 105)  BP: 111/73 (16 Dec 2017 12:00) (105/61 - 161/77)  BP(mean): 87 (16 Dec 2017 12:00) (78 - 134)  RR: 24 (16 Dec 2017 15:00) (11 - 30)  SpO2: 96% (16 Dec 2017 15:00) (92% - 98%)    PHYSICAL EXAM:    General appearance: elderly male, NAD  Eyes: Pupils equal, EOMI  Lungs: Normal respiratory excursion. Lungs clear anteriorly  CV: Regular cardiac rhythm. No murmur.   Abdomen: Soft, non tender, nondistended  Skin: dry MM  Neuro: Cranial nerves intact.   Psych: Normal affect, good judgement.        LABS:                        10.4   11.5  )-----------( 100      ( 16 Dec 2017 04:21 )             31.3     12-16    141  |  106  |  42.0<H>  ----------------------------<  164<H>  4.5   |  21.0<L>  |  1.31<H>    Ca    8.5<L>      16 Dec 2017 04:21  Phos  2.6     12-16  Mg     2.2     12-16    TPro  6.1<L>  /  Alb  3.6  /  TBili  2.1<H>  /  DBili  x   /  AST  26  /  ALT  17  /  AlkPhos  56  12-16        CAPILLARY BLOOD GLUCOSE  POCT Blood Glucose.: 164 mg/dL (16 Dec 2017 16:47)  POCT Blood Glucose.: 238 mg/dL (16 Dec 2017 12:11)  POCT Blood Glucose.: 175 mg/dL (16 Dec 2017 07:59)  POCT Blood Glucose.: 144 mg/dL (15 Dec 2017 23:44)  POCT Blood Glucose.: 141 mg/dL (15 Dec 2017 22:21)  POCT Blood Glucose.: 110 mg/dL (15 Dec 2017 20:40)  POCT Blood Glucose.: 96 mg/dL (15 Dec 2017 19:50)  POCT Blood Glucose.: 118 mg/dL (15 Dec 2017 17:47)
INTERVAL SUBJECTIVE & REVIEW OF SYMPTOMS:  Chart reviewed. Patient seen at bedside along with PT. wife present.    Patient states that he feels well.  denies any dyspnea or CP      REVIEW OF SYSTEMS  Denies CP/ + dyspnea.   GI: appetite still poor  Denies HA/abdominal pain    Vital Signs Last 24 Hrs  T(C): 36.6 (20 Dec 2017 10:00), Max: 37.1 (19 Dec 2017 21:34)  T(F): 97.8 (20 Dec 2017 10:00), Max: 98.7 (19 Dec 2017 21:34)  HR: 71 (20 Dec 2017 10:00) (71 - 89)  BP: 100/58 (20 Dec 2017 10:00) (90/50 - 123/70)  BP(mean): --  RR: 17 (20 Dec 2017 10:00) (17 - 18)  SpO2: 99% (20 Dec 2017 05:37) (98% - 100%)    PHYSICAL EXAM  General: seated in chair, appears comfortable. off nasal oxygen  Cardio: S1S2+  Resp: clear  Abdomen: soft  Extrem: bilateral pedal edema- mild  Skin: sternal incision: clean    Functional Exam: supervision with PT for ambulation, min/ modassist on stairs stepping training and needs frequent rest breaks      RECENT LABS:                          12.3   12.1  )-----------( 175      ( 20 Dec 2017 06:06 )             37.2     12-20    145  |  105  |  46.0<H>  ----------------------------<  132<H>  4.5   |  26.0  |  1.42<H>    Ca    8.7      20 Dec 2017 06:06  Phos  3.4     12-19  Mg     2.2     12-20                            12.0   11.0  )-----------( 142      ( 19 Dec 2017 06:07 )             36.2     12-19    145  |  105  |  46.0<H>  ----------------------------<  104  4.2   |  28.0  |  1.36<H>    Ca    8.6      19 Dec 2017 06:07  Phos  3.4     12-19  Mg     2.1     12-19    PT/INR - ( 20 Dec 2017 06:06 )   PT: 17.0 sec;   INR: 1.53 ratio    PT/INR - ( 19 Dec 2017 06:07 )   PT: 15.8 sec;   INR: 1.43 ratio       RADIOLOGY/OTHER RESULTS:    < from: Xray Chest 1 View AP/PA. (12.18.17 @ 05:00) >  EXAM:  CHEST SINGLE VIEW FRONTAL                          PROCEDURE DATE:  12/18/2017          INTERPRETATION:  History: Status post open heart surgery. Pacemaker.    Technique:  AP portable    Comparisons:  Chest x-ray dated 12/17/2017    Findings: Left lung is clear. Small right pleural effusion. Subsegmental   atelectasis right lower lobe. No significant pulmonic congestion.     Status post median sternotomy. No change in pacemaker wire position in   right ventricle. Prosthetic aortic and mitral valves. Small clip on left   atrial appendage Normal for age.    Impression: Pacemaker. Prosthetic aortic and mitral valves.    Subsegmental atelectasis right lower lobe and small right pleural effusion      < end of copied text >    A/P:    76 y/o M with a h/o afib on coumadin CAD with prior PCI DM, HLD, HTN, MI PPM CVA COPD current smoker,  12/8 presented to [facility] in acute systolic HF and transferred to [facility],    now s/p CABG/AVR/MVR .  Showing progress with bedside therapy    Patient would benefit from acute rehab: PT/OT 3-4 hrs/day 5-6 days/ week to improve  recent decline in function/endurance following OHS. He will be able to tolerate acute rehab program. May be transferred to acute rehab when bed available.   ELOS : 5-7 days. goals of modified independent     Thank you
INTERVAL SUBJECTIVE & REVIEW OF SYMPTOMS:  Chart reviewed. Patient seen at bedside. wife present.    Patient states that he is doing ok. Was able to walk with therapy, but had drop in  oxygen sat with stairs and needed supplemental o2.  Per wife some confusion noted in the afternoon.  Being followed by endocrine and by renal    REVIEW OF SYSTEMS  Denies CP/ + dyspnea.   GI: appetite poor  + BM    VITAL SIGNS  Vital Signs Last 24 Hrs  MEDICATIONS  (STANDING):  amiodarone    Tablet 200 milliGRAM(s) Oral daily  aspirin enteric coated 325 milliGRAM(s) Oral daily  atorvastatin 80 milliGRAM(s) Oral at bedtime  carvedilol 3.125 milliGRAM(s) Oral every 12 hours  docusate sodium 100 milliGRAM(s) Oral three times a day  enoxaparin Injectable 40 milliGRAM(s) SubCutaneous daily  furosemide    Tablet 40 milliGRAM(s) Oral daily  insulin lispro (HumaLOG) corrective regimen sliding scale   SubCutaneous Before meals and at bedtime  metFORMIN 500 milliGRAM(s) Oral two times a day with meals  pantoprazole    Tablet 40 milliGRAM(s) Oral before breakfast  polyethylene glycol 3350 17 Gram(s) Oral daily  silver sulfADIAZINE 1% Cream 1 Application(s) Topical two times a day  spironolactone 25 milliGRAM(s) Oral daily  tiotropium 18 MICROgram(s) Capsule 1 Capsule(s) Inhalation daily  warfarin 2.5 milliGRAM(s) Oral once    MEDICATIONS  (PRN):  ALBUTerol/ipratropium for Nebulization 3 milliLiter(s) Nebulizer every 6 hours PRN SOB/wheezing  T(C): 36.4 (19 Dec 2017 10:00), Max: 36.7 (18 Dec 2017 22:26)  T(F): 97.5 (19 Dec 2017 10:00), Max: 98.1 (18 Dec 2017 22:26)  HR: 80 (19 Dec 2017 10:00) (80 - 91)  BP: 96/56 (19 Dec 2017 06:00) (96/56 - 112/60)  BP(mean): --  RR: 18 (19 Dec 2017 10:00) (18 - 18)  SpO2: 99% (19 Dec 2017 09:22) (97% - 100%)    PHYSICAL EXAM  General: seated in chair, appears comfortable. off nasal oxygen at this time  Cardio: S1S2+  Resp: clear  Abdomen: soft  Extrem: bilateral pedal edema- mild  Skin: sternal incision: appears clean    Functional Exam: supervision with PT for ambulation, assist on stairs and need of oxygen        RECENT LABS:                        12.0   11.0  )-----------( 142      ( 19 Dec 2017 06:07 )             36.2     12-19    145  |  105  |  46.0<H>  ----------------------------<  104  4.2   |  28.0  |  1.36<H>    Ca    8.6      19 Dec 2017 06:07  Phos  3.4     12-19  Mg     2.1     12-19      PT/INR - ( 19 Dec 2017 06:07 )   PT: 15.8 sec;   INR: 1.43 ratio       RADIOLOGY/OTHER RESULTS:    < from: Xray Chest 1 View AP/PA. (12.18.17 @ 05:00) >  EXAM:  CHEST SINGLE VIEW FRONTAL                          PROCEDURE DATE:  12/18/2017          INTERPRETATION:  History: Status post open heart surgery. Pacemaker.    Technique:  AP portable    Comparisons:  Chest x-ray dated 12/17/2017    Findings: Left lung is clear. Small right pleural effusion. Subsegmental   atelectasis right lower lobe. No significant pulmonic congestion.     Status post median sternotomy. No change in pacemaker wire position in   right ventricle. Prosthetic aortic and mitral valves. Small clip on left   atrial appendage Normal for age.    Impression: Pacemaker. Prosthetic aortic and mitral valves.    Subsegmental atelectasis right lower lobe and small right pleural effusion      < end of copied text >    A/P:    78 y/o M with a h/o afib on coumadin CAD with prior PCI DM, HLD, HTN, MI PPM CVA COPD current smoker,  12/8 presented to [facility] in acute systolic HF and transferred to [facility],    now s/p CABG/AVR/MVR     Patient would benefit from acute rehab: PT/OT 3-4 hrs/day 5-6 days/ week to improve  recent decline in function/endurance following OHS. He will be able to tolerate acute rehab program. May be transferred to acute rehab when bed available.     Precautions: fall, cardiac, sternal coumadin, PPM    Thank you
[facility] DIVISION OF KIDNEY DISEASES AND HYPERTENSION -- FOLLOW UP NOTE  --------------------------------------------------------------------------------  Chief Complaint: BENIGNO    24 hour events/subjective:  Pt seen and examined  No complaints  Sitting up in chair in NAD        PAST HISTORY  --------------------------------------------------------------------------------  No significant changes to PMH, PSH, FHx, SHx, unless otherwise noted    ALLERGIES & MEDICATIONS  --------------------------------------------------------------------------------  Allergies    penicillin (Anaphylaxis)    Intolerances    OHS PT (Unknown)    Standing Inpatient Medications  ALBUTerol/ipratropium for Nebulization 3 milliLiter(s) Nebulizer every 6 hours  amiodarone    Tablet 200 milliGRAM(s) Oral daily  aspirin enteric coated 325 milliGRAM(s) Oral daily  atorvastatin 80 milliGRAM(s) Oral at bedtime  docusate sodium 100 milliGRAM(s) Oral three times a day  enoxaparin Injectable 40 milliGRAM(s) SubCutaneous daily  insulin glargine Injectable (LANTUS) 10 Unit(s) SubCutaneous at bedtime  insulin lispro Injectable (HumaLOG) 2 Unit(s) SubCutaneous three times a day before meals  milrinone Infusion 0.25 MICROgram(s)/kG/Min IV Continuous <Continuous>  pantoprazole    Tablet 40 milliGRAM(s) Oral before breakfast  sodium chloride 0.9%. 1000 milliLiter(s) IV Continuous <Continuous>  sodium chloride 0.9%. 1000 milliLiter(s) IV Continuous <Continuous>  tiotropium 18 MICROgram(s) Capsule 1 Capsule(s) Inhalation daily  warfarin 2.5 milliGRAM(s) Oral once    PRN Inpatient Medications      REVIEW OF SYSTEMS  --------------------------------------------------------------------------------  Gen: No weight changes, fatigue, fevers/chills, weakness  Skin: No rashes  Head/Eyes/Ears/Mouth: No headache; Normal hearing; Normal vision w/o blurriness; No sinus pain/discomfort, sore throat  Respiratory: No dyspnea, cough, wheezing, hemoptysis  CV: No chest pain, PND, orthopnea  GI: No abdominal pain, diarrhea, constipation, nausea, vomiting, melena, hematochezia  : No increased frequency, dysuria, hematuria, nocturia  MSK: No joint pain/swelling; no back pain; no edema  Neuro: No dizziness/lightheadedness, weakness, seizures, numbness, tingling  Heme: No easy bruising or bleeding  Endo: No heat/cold intolerance  Psych: No significant nervousness, anxiety, stress, depression    All other systems were reviewed and are negative, except as noted.    VITALS/PHYSICAL EXAM  --------------------------------------------------------------------------------  T(C): 36.8 (12-16-17 @ 06:00), Max: 37.8 (12-15-17 @ 22:00)  HR: 89 (12-16-17 @ 11:00) (87 - 106)  BP: 160/64 (12-16-17 @ 10:00) (87/72 - 161/77)  RR: 26 (12-16-17 @ 11:00) (9 - 32)  SpO2: 96% (12-16-17 @ 11:00) (92% - 98%)  Wt(kg): --        12-15-17 @ 07:01  -  12-16-17 @ 07:00  --------------------------------------------------------  IN: 1830.7 mL / OUT: 1525 mL / NET: 305.7 mL    12-16-17 @ 07:01  -  12-16-17 @ 13:07  --------------------------------------------------------  IN: 54.3 mL / OUT: 140 mL / NET: -85.7 mL      Physical Exam:  	Gen: NAD  	HEENT: PERRL, supple neck, clear oropharynx  	Pulm: CTA B/L  	CV: RRR, S1S2; no rub  	Back: No spinal or CVA tenderness; no sacral edema  	Abd: +BS, soft, nontender/nondistended  	: No suprapubic tenderness  	UE: Warm, FROM, no clubbing, intact strength; no edema; no asterixis  	LE: Warm, FROM, no clubbing, intact strength; no edema  	Neuro: No focal deficits, intact gait  	Psych: Normal affect and mood  	Skin: Warm, without rashes  	Vascular access: NA    LABS/STUDIES  --------------------------------------------------------------------------------              10.4   11.5  >-----------<  100      [12-16-17 @ 04:21]              31.3     141  |  106  |  42.0  ----------------------------<  164      [12-16-17 @ 04:21]  4.5   |  21.0  |  1.31        Ca     8.5     [12-16-17 @ 04:21]      Mg     2.2     [12-16-17 @ 04:21]      Phos  2.6     [12-16-17 @ 04:21]    TPro  6.1  /  Alb  3.6  /  TBili  2.1  /  DBili  x   /  AST  26  /  ALT  17  /  AlkPhos  56  [12-16-17 @ 04:21]    PT/INR: PT 12.9 , INR 1.17       [12-16-17 @ 04:21]  PTT: 28.4       [12-16-17 @ 04:21]      Creatinine Trend:  SCr 1.31 [12-16 @ 04:21]  SCr 1.63 [12-15 @ 02:36]  SCr 1.37 [12-14 @ 03:47]  SCr 1.35 [12-13 @ 16:27]  SCr 1.79 [12-13 @ 06:04]    Urinalysis - [12-09-17 @ 12:03]      Color Yellow / Appearance Clear / SG 1.010 / pH 6.0      Gluc Negative / Ketone Negative  / Bili Negative / Urobili Negative       Blood Negative / Protein Negative / Leuk Est Negative / Nitrite Negative      RBC  / WBC 0-2 / Hyaline  / Gran  / Sq Epi  / Non Sq Epi  / Bacteria Few      PTH -- (Ca 9.1)      [12-11-17 @ 18:53]   70  PTH -- (Ca 9.2)      [12-10-17 @ 15:41]   66  Vitamin D (25OH) 38.8      [12-11-17 @ 18:43]  HbA1c 6.2      [12-06-17 @ 14:29]  TSH 5.82      [12-08-17 @ 13:36]
[facility] DIVISION OF KIDNEY DISEASES AND HYPERTENSION -- FOLLOW UP NOTE  --------------------------------------------------------------------------------  Chief Complaint:   Male w. COPD, acute on chronic heart failure, + CAD - Planned CABG 12/13/17    24 hour events/subjective:  Pt seen and examined with Dr. Gotti today  Awake, NAD  Voiding without complication  IV Lasix        PAST HISTORY  --------------------------------------------------------------------------------  No significant changes to PMH, PSH, FHx, SHx, unless otherwise noted    ALLERGIES & MEDICATIONS  --------------------------------------------------------------------------------  Allergies    penicillin (Anaphylaxis)    Intolerances      Standing Inpatient Medications  ALBUTerol    0.083% 2.5 milliGRAM(s) Nebulizer every 6 hours  amiodarone    Tablet 200 milliGRAM(s) Oral daily  aspirin  chewable 81 milliGRAM(s) Oral daily  atorvastatin 80 milliGRAM(s) Oral at bedtime  buDESOnide  80 MICROgram(s)/formoterol 4.5 MICROgram(s) Inhaler 2 Puff(s) Inhalation two times a day  carvedilol 6.25 milliGRAM(s) Oral every 12 hours  chlorhexidine 0.12% Liquid 15 milliLiter(s) Swish and Spit two times a day  chlorhexidine 4% Liquid 1 Application(s) Topical two times a day  docusate sodium 100 milliGRAM(s) Oral three times a day  furosemide   Injectable 20 milliGRAM(s) IV Push two times a day  insulin lispro (HumaLOG) corrective regimen sliding scale   SubCutaneous Before meals and at bedtime  mupirocin 2% Ointment 1 Application(s) Topical two times a day  phytonadione   Solution 5 milliGRAM(s) Oral once  sodium chloride 0.9% lock flush 3 milliLiter(s) IV Push every 8 hours  spironolactone 25 milliGRAM(s) Oral daily  tiotropium 18 MICROgram(s) Capsule 1 Capsule(s) Inhalation daily    PRN Inpatient Medications      REVIEW OF SYSTEMS  --------------------------------------------------------------------------------  Gen: No weight changes, fatigue, fevers/chills, weakness  Skin: No rashes  Head/Eyes/Ears/Mouth: No headache; Normal hearing; Normal vision w/o blurriness;   Respiratory: No dyspnea, cough, wheezing, hemoptysis  CV: No chest pain, PND, orthopnea  GI: No abdominal pain, diarrhea, constipation, nausea, vomiting, melena, hematochezia  : No increased frequency, dysuria, hematuria, nocturia  MSK: No joint pain/swelling; no back pain; no edema  Neuro: No dizziness/lightheadedness, weakness, seizures, numbness, tingling  Heme: No easy bruising or bleeding  Endo: No heat/cold intolerance  Psych: No significant nervousness, anxiety, stress, depression    All other systems were reviewed and are negative, except as noted.    VITALS/PHYSICAL EXAM  --------------------------------------------------------------------------------  T(C): 36.4 (12-11-17 @ 06:24), Max: 36.6 (12-10-17 @ 21:26)  HR: 84 (12-11-17 @ 06:24) (84 - 100)  BP: 104/52 (12-11-17 @ 06:24) (94/60 - 130/64)  RR: 16 (12-11-17 @ 06:24) (16 - 17)  SpO2: 97% (12-11-17 @ 06:24) (97% - 100%)  Wt(kg): --        12-10-17 @ 07:01  -  12-11-17 @ 07:00  --------------------------------------------------------  IN: 0 mL / OUT: 1050 mL / NET: -1050 mL      Physical Exam:  	Gen: NAD, well-appearing  	HEENT: PERRL, supple neck, clear oropharynx  	Pulm: CTA B/L  	CV: RRR, S1S2; no rub  	Back: No spinal or CVA tenderness; no sacral edema  	Abd: +BS, soft, nontender/nondistended  	: No suprapubic tenderness  	UE: Warm, FROM, no clubbing, intact strength; no edema; no asterixis  	LE: Warm, FROM, no clubbing, intact strength; no edema  	Neuro: No focal deficits, intact gait  	Psych: Normal affect and mood  	Skin: Warm, without rashes  	Vascular access: NA    LABS/STUDIES  --------------------------------------------------------------------------------              15.2   6.8   >-----------<  126      [12-10-17 @ 05:59]              45.1     144  |  103  |  46.0  ----------------------------<  136      [12-11-17 @ 05:36]  4.6   |  28.0  |  1.94        Ca     8.9     [12-11-17 @ 05:36]      Mg     2.4     [12-10-17 @ 05:59]      Phos  3.4     [12-10-17 @ 05:59]      PT/INR: PT 28.8 , INR 2.57       [12-11-17 @ 05:36]      Creatinine Trend:  SCr 1.94 [12-11 @ 05:36]  SCr 1.73 [12-10 @ 05:59]  SCr 1.52 [12-09 @ 03:41]  SCr 1.41 [12-08 @ 13:36]  SCr 1.60 [12-08 @ 04:21]    Urinalysis - [12-09-17 @ 12:03]      Color Yellow / Appearance Clear / SG 1.010 / pH 6.0      Gluc Negative / Ketone Negative  / Bili Negative / Urobili Negative       Blood Negative / Protein Negative / Leuk Est Negative / Nitrite Negative      RBC  / WBC 0-2 / Hyaline  / Gran  / Sq Epi  / Non Sq Epi  / Bacteria Few    Urine Creatinine 39      [12-09-17 @ 12:50]  Urine Protein 5.0      [12-09-17 @ 12:50]  Urine Sodium 131      [12-09-17 @ 12:50]  Urine Chloride 128      [12-09-17 @ 12:50]  Urine Osmolality 414      [12-09-17 @ 12:03]    PTH -- (Ca 9.2)      [12-10-17 @ 15:41]   66  HbA1c 6.2      [12-06-17 @ 14:29]  TSH 5.82      [12-08-17 @ 13:36]
PULMONARY PROGRESS NOTE      YOGI [id number]    Patient is a 77y old  Male who presents with a chief complaint of Transfer from [facility] with acute CHF exacerbation preop for CABG next week. (08 Dec 2017 17:03)      INTERVAL HPI/OVERNIGHT EVENTS:  Doing well  On nebs and MDI?     MEDICATIONS  (STANDING):  ALBUTerol    0.083% 2.5 milliGRAM(s) Nebulizer every 6 hours  amiodarone    Tablet 200 milliGRAM(s) Oral daily  aspirin  chewable 81 milliGRAM(s) Oral daily  atorvastatin 80 milliGRAM(s) Oral at bedtime  buDESOnide  80 MICROgram(s)/formoterol 4.5 MICROgram(s) Inhaler 2 Puff(s) Inhalation two times a day  carvedilol 6.25 milliGRAM(s) Oral every 12 hours  chlorhexidine 0.12% Liquid 15 milliLiter(s) Swish and Spit two times a day  chlorhexidine 4% Liquid 1 Application(s) Topical two times a day  docusate sodium 100 milliGRAM(s) Oral three times a day  furosemide   Injectable 20 milliGRAM(s) IV Push two times a day  insulin lispro (HumaLOG) corrective regimen sliding scale   SubCutaneous Before meals and at bedtime  mupirocin 2% Ointment 1 Application(s) Topical two times a day  sodium chloride 0.9% lock flush 3 milliLiter(s) IV Push every 8 hours  spironolactone 25 milliGRAM(s) Oral daily  tiotropium 18 MICROgram(s) Capsule 1 Capsule(s) Inhalation daily      MEDICATIONS  (PRN):      Allergies    penicillin (Anaphylaxis)    Intolerances        PAST MEDICAL & SURGICAL HISTORY:  Hypertension  Stroke  CHF (congestive heart failure)  Stented coronary artery  MI, old  Asthma  Atrial fibrillation  Diabetes  Bronchitis  Hyperlipidemia  Pacemaker  COPD (chronic obstructive pulmonary disease)  Cardiac arrhythmia  H/O hernia repair  Cardiac pacemaker      SOCIAL HISTORY  Smoking History:       REVIEW OF SYSTEMS:    CONSTITUTIONAL:  No distress    HEENT:  Eyes:  No diplopia or blurred vision. ENT:  No earache, sore throat or runny nose.    CARDIOVASCULAR:  No pressure, squeezing, tightness, heaviness or aching about the chest; no palpitations.    RESPIRATORY:  No cough, shortness of breath, PND or orthopnea. Mild SOBOE    GASTROINTESTINAL:  No nausea, vomiting or diarrhea.    GENITOURINARY:  No dysuria, frequency or urgency.    MUSCULOSKELETAL:  No joint pain    SKIN:  No new lesions.    NEUROLOGIC:  No paresthesias, fasciculations, seizures or weakness.    PSYCHIATRIC:  No disorder of thought or mood.    ENDOCRINE:  No heat or cold intolerance, polyuria or polydipsia.    HEMATOLOGICAL:  No easy bruising or bleeding.     Vital Signs Last 24 Hrs  T(C): 36.4 (11 Dec 2017 06:24), Max: 36.6 (10 Dec 2017 21:26)  T(F): 97.6 (11 Dec 2017 06:24), Max: 97.8 (10 Dec 2017 21:26)  HR: 84 (11 Dec 2017 06:24) (84 - 100)  BP: 104/52 (11 Dec 2017 06:24) (100/54 - 130/64)  BP(mean): --  RR: 16 (11 Dec 2017 06:24) (16 - 17)  SpO2: 97% (11 Dec 2017 06:24) (97% - 100%)    PHYSICAL EXAMINATION:    GENERAL: The patient is awake and alert in no apparent distress.     HEENT: Head is normocephalic and atraumatic. Extraocular muscles are intact. Mucous membranes are moist.    NECK: Supple.    LUNGS: Clear to auscultation without wheezing, rales or rhonchi; respirations unlabored    HEART: Regular rate and rhythm without murmur.    ABDOMEN: Soft, nontender, and nondistended.      EXTREMITIES: Without any cyanosis, clubbing, rash, lesions or edema.    NEUROLOGIC: Grossly intact.    SKIN: No ulceration or induration present.      LABS:                        15.2   6.8   )-----------( 126      ( 10 Dec 2017 05:59 )             45.1     12-11    144  |  103  |  46.0<H>  ----------------------------<  136<H>  4.6   |  28.0  |  1.94<H>    Ca    8.9      11 Dec 2017 05:36  Phos  3.4     12-10  Mg     2.4     12-10      PT/INR - ( 11 Dec 2017 05:36 )   PT: 28.8 sec;   INR: 2.57 ratio         Spirometry reviewed.  Poor study>at least values.  Moderate restriction in vital capacity.  Mild obstruction.  Study was done with patient sitting in bed.                     MICROBIOLOGY:    RADIOLOGY & ADDITIONAL STUDIES:
Renal :    143    |  103    |  30.0<H>  ----------------------------<  121<H>  Ca:8.6   (09 Dec 2017 03:41)  4.0     |  26.0   |  1.52<H>      eGFR if Non : 44 <L>  eGFR if : 51 <L>    TPro  7.0 g/dL  /  Alb  3.9 g/dL  /  TBili  2.2 mg/dL<H>  /  DBili  0.2 mg/dL  /  AST  19 U/L  /  ALT  20 U/L  /  AlkPhos  90 U/L  08 Dec 2017 13:36                        14.5   6.0   )-----------( 116<L>    ( 09 Dec 2017 03:41 )             42.7     Phos:-- M.8 mg/dL PTH:-- Uric acid:-- Serum Osm:--  Ferritin:-- Iron:-- TIBC:-- Tsat:--  B12:-- TSH:-- ( @ 03:41)    Urinalysis Basic - ( 09 Dec 2017 03:49 )  Color: Yellow / Appearance: Clear / S.015 / pH: x  Gluc: x / Ketone: Negative  / Bili: Negative / Urobili: Negative mg/dL   Blood: x / Protein: Negative mg/dL / Nitrite: Negative   Leuk Esterase: Negative / RBC: x / WBC x   Sq Epi: x / Non Sq Epi: x / Bacteria: x    MEDICATIONS  (STANDING):    ALBUTerol    0.083% 2.5 milliGRAM(s) Nebulizer every 6 hours  amiodarone    Tablet 200 milliGRAM(s) Oral daily  atorvastatin 80 milliGRAM(s) Oral at bedtime  carvedilol 6.25 milliGRAM(s) Oral every 12 hours  furosemide   Injectable 20 milliGRAM(s) IV Push two times a day  insulin lispro (HumaLOG) corrective regimen sliding scale   SubCutaneous Before meals and at bedtime  lisinopril 5 milliGRAM(s) Oral daily  spironolactone 25 milliGRAM(s) Oral daily  tiotropium 18 MICROgram(s) Capsule 1 Capsule(s) Inhalation daily      78 YO - w. CHF , CRS - 3 , Low EF , On Cardiacc Meds for CHF,    DN , HTN , S/P CVA , + Pace maker,    For CABG on Wednesday,    Discussed anuel Sue ( [facility] )    Full consult to Follow,
[facility] DIVISION OF KIDNEY DISEASES AND HYPERTENSION -- FOLLOW UP NOTE  --------------------------------------------------------------------------------  Chief Complaint:   Male w. COPD, acute on chronic heart failure, + CAD - Planned CABG 12/13/17    24 hour events/subjective:  Pt seen and examined with Dr. Gotti today  Awake, NAD  Voiding without complication  IV Lasix        PAST HISTORY  --------------------------------------------------------------------------------  No significant changes to PMH, PSH, FHx, SHx, unless otherwise noted    ALLERGIES & MEDICATIONS  --------------------------------------------------------------------------------  Allergies    penicillin (Anaphylaxis)    Intolerances      Standing Inpatient Medications  ALBUTerol    90 MICROgram(s) HFA Inhaler 1 Puff(s) Inhalation every 4 hours  amiodarone    Tablet 200 milliGRAM(s) Oral daily  aspirin  chewable 81 milliGRAM(s) Oral daily  atorvastatin 80 milliGRAM(s) Oral at bedtime  buDESOnide  80 MICROgram(s)/formoterol 4.5 MICROgram(s) Inhaler 2 Puff(s) Inhalation two times a day  carvedilol 6.25 milliGRAM(s) Oral every 12 hours  chlorhexidine 0.12% Liquid 15 milliLiter(s) Swish and Spit two times a day  chlorhexidine 4% Liquid 1 Application(s) Topical two times a day  docusate sodium 100 milliGRAM(s) Oral three times a day  furosemide   Injectable 20 milliGRAM(s) IV Push two times a day  insulin lispro (HumaLOG) corrective regimen sliding scale   SubCutaneous Before meals and at bedtime  mupirocin 2% Ointment 1 Application(s) Topical two times a day  sodium chloride 0.9% lock flush 3 milliLiter(s) IV Push every 8 hours  spironolactone 25 milliGRAM(s) Oral daily  tiotropium 18 MICROgram(s) Capsule 1 Capsule(s) Inhalation daily    PRN Inpatient Medications  ALBUTerol    0.083% 2.5 milliGRAM(s) Nebulizer every 6 hours PRN      REVIEW OF SYSTEMS  --------------------------------------------------------------------------------  Gen: No weight changes, fatigue, fevers/chills, weakness  Skin: No rashes  Head/Eyes/Ears/Mouth: No headache; Normal hearing; Normal vision w/o blurriness; No sinus pain/discomfort, sore throat  Respiratory: No dyspnea, cough, wheezing, hemoptysis  CV: No chest pain, PND, orthopnea  GI: No abdominal pain, diarrhea, constipation, nausea, vomiting, melena, hematochezia  : No increased frequency, dysuria, hematuria, nocturia  MSK: No joint pain/swelling; no back pain; no edema  Neuro: No dizziness/lightheadedness, weakness, seizures, numbness, tingling  Heme: No easy bruising or bleeding  Endo: No heat/cold intolerance  Psych: No significant nervousness, anxiety, stress, depression    All other systems were reviewed and are negative, except as noted.    VITALS/PHYSICAL EXAM  --------------------------------------------------------------------------------  T(C): 36.8 (12-12-17 @ 10:00), Max: 36.8 (12-12-17 @ 10:00)  HR: 97 (12-12-17 @ 10:00) (93 - 105)  BP: 112/93 (12-12-17 @ 10:00) (90/50 - 112/93)  RR: 18 (12-12-17 @ 10:00) (18 - 18)  SpO2: 97% (12-12-17 @ 05:45) (95% - 97%)  Wt(kg): --        12-11-17 @ 07:01  -  12-12-17 @ 07:00  --------------------------------------------------------  IN: 960 mL / OUT: 275 mL / NET: 685 mL    12-12-17 @ 07:01  -  12-12-17 @ 13:41  --------------------------------------------------------  IN: 120 mL / OUT: 700 mL / NET: -580 mL      Physical Exam:  	Gen: NAD, well-appearing  	HEENT: PERRL, supple neck, clear oropharynx  	Pulm: CTA B/L  	CV: RRR, S1S2; no rub  	Back: No spinal or CVA tenderness; no sacral edema  	Abd: +BS, soft, nontender/nondistended  	: No suprapubic tenderness  	UE: Warm, FROM, no clubbing, intact strength; no edema; no asterixis  	LE: Warm, FROM, no clubbing, intact strength; no edema  	Neuro: No focal deficits, intact gait  	Psych: Normal affect and mood  	Skin: Warm, without rashes  	Vascular access: NA    LABS/STUDIES  --------------------------------------------------------------------------------              15.5   5.7   >-----------<  123      [12-12-17 @ 05:51]              46.3     142  |  102  |  47.0  ----------------------------<  112      [12-12-17 @ 05:51]  4.4   |  26.0  |  1.93        Ca     8.8     [12-12-17 @ 05:51]    TPro  7.0  /  Alb  3.8  /  TBili  1.9  /  DBili  x   /  AST  28  /  ALT  26  /  AlkPhos  88  [12-12-17 @ 05:51]    PT/INR: PT 19.9 , INR 1.79       [12-12-17 @ 08:40]      Creatinine Trend:  SCr 1.93 [12-12 @ 05:51]  SCr 1.94 [12-11 @ 05:36]  SCr 1.73 [12-10 @ 05:59]  SCr 1.52 [12-09 @ 03:41]  SCr 1.41 [12-08 @ 13:36]    Urinalysis - [12-09-17 @ 12:03]      Color Yellow / Appearance Clear / SG 1.010 / pH 6.0      Gluc Negative / Ketone Negative  / Bili Negative / Urobili Negative       Blood Negative / Protein Negative / Leuk Est Negative / Nitrite Negative      RBC  / WBC 0-2 / Hyaline  / Gran  / Sq Epi  / Non Sq Epi  / Bacteria Few    Urine Creatinine 39      [12-09-17 @ 12:50]  Urine Protein 5.0      [12-09-17 @ 12:50]  Urine Sodium 131      [12-09-17 @ 12:50]  Urine Chloride 128      [12-09-17 @ 12:50]  Urine Osmolality 414      [12-09-17 @ 12:03]    PTH -- (Ca 9.1)      [12-11-17 @ 18:53]   70  PTH -- (Ca 9.2)      [12-10-17 @ 15:41]   66  Vitamin D (25OH) 38.8      [12-11-17 @ 18:43]  HbA1c 6.2      [12-06-17 @ 14:29]  TSH 5.82      [12-08-17 @ 13:36]
[facility] DIVISION OF KIDNEY DISEASES AND HYPERTENSION -- FOLLOW UP NOTE  --------------------------------------------------------------------------------  Chief Complaint: BENIGNO    24 hour events/subjective:  Pt seen and examined; NAD  Comfortable; seen in CTICU  sp MV/AV/CABG        PAST HISTORY  --------------------------------------------------------------------------------  No significant changes to PMH, PSH, FHx, SHx, unless otherwise noted    ALLERGIES & MEDICATIONS  --------------------------------------------------------------------------------  Allergies    penicillin (Anaphylaxis)    Intolerances    [facility] PT (Unknown)    Standing Inpatient Medications  ALBUTerol/ipratropium for Nebulization 3 milliLiter(s) Nebulizer every 6 hours  amiodarone    Tablet 200 milliGRAM(s) Oral daily  aspirin enteric coated 325 milliGRAM(s) Oral daily  atorvastatin 80 milliGRAM(s) Oral at bedtime  docusate sodium 100 milliGRAM(s) Oral three times a day  enoxaparin Injectable 40 milliGRAM(s) SubCutaneous daily  insulin Infusion 1 Unit(s)/Hr IV Continuous <Continuous>  insulin lispro Injectable (HumaLOG) 2 Unit(s) SubCutaneous three times a day before meals  milrinone Infusion 0.25 MICROgram(s)/kG/Min IV Continuous <Continuous>  norepinephrine Infusion 0.013 MICROgram(s)/kG/Min IV Continuous <Continuous>  pantoprazole    Tablet 40 milliGRAM(s) Oral before breakfast  sodium chloride 0.9%. 1000 milliLiter(s) IV Continuous <Continuous>  sodium chloride 0.9%. 1000 milliLiter(s) IV Continuous <Continuous>  tiotropium 18 MICROgram(s) Capsule 1 Capsule(s) Inhalation daily  warfarin 2.5 milliGRAM(s) Oral once    PRN Inpatient Medications      REVIEW OF SYSTEMS  --------------------------------------------------------------------------------  Gen: No weight changes, fatigue, fevers/chills, weakness  Skin: No rashes  Head/Eyes/Ears/Mouth: No headache; Normal hearing; Normal vision w/o blurriness; No sinus pain/discomfort, sore throat  Respiratory: No dyspnea, cough, wheezing, hemoptysis  CV: No chest pain, PND, orthopnea  GI: No abdominal pain, diarrhea, constipation, nausea, vomiting, melena, hematochezia  : No increased frequency, dysuria, hematuria, nocturia  MSK: No joint pain/swelling; no back pain; no edema  Neuro: No dizziness/lightheadedness, weakness, seizures, numbness, tingling  Heme: No easy bruising or bleeding  Endo: No heat/cold intolerance  Psych: No significant nervousness, anxiety, stress, depression    All other systems were reviewed and are negative, except as noted.    VITALS/PHYSICAL EXAM  --------------------------------------------------------------------------------  T(C): 37.4 (12-15-17 @ 12:00), Max: 37.4 (12-15-17 @ 12:00)  HR: 107 (12-15-17 @ 12:45) (82 - 123)  BP: 83/55 (12-15-17 @ 12:45) (83/55 - 124/59)  RR: 29 (12-15-17 @ 12:45) (15 - 31)  SpO2: 94% (12-15-17 @ 12:45) (88% - 99%)  Wt(kg): --        12-14-17 @ 07:01  -  12-15-17 @ 07:00  --------------------------------------------------------  IN: 2698.4 mL / OUT: 1965 mL / NET: 733.4 mL    12-15-17 @ 07:01  -  12-15-17 @ 14:00  --------------------------------------------------------  IN: 825.3 mL / OUT: 445 mL / NET: 380.3 mL      Physical Exam:  	Gen: NAD  	HEENT: PERRL, supple neck, clear oropharynx  	Pulm: CTA B/L  	CV: RRR, S1S2; no rub  	Back: No spinal or CVA tenderness; no sacral edema  	Abd: +BS, soft, nontender/nondistended  	: No suprapubic tenderness  	UE: Warm, FROM, no clubbing, intact strength; no edema; no asterixis  	LE: Warm, FROM, no clubbing, intact strength; no edema  	Neuro: No focal deficits, intact gait  	Psych: Normal affect and mood  	Skin: Warm, without rashes  	Vascular access: NA    LABS/STUDIES  --------------------------------------------------------------------------------              10.0   15.5  >-----------<  108      [12-15-17 @ 02:36]              29.9     141  |  105  |  38.0  ----------------------------<  214      [12-15-17 @ 02:36]  4.7   |  20.0  |  1.63        Ca     8.1     [12-15-17 @ 02:36]      Mg     2.1     [12-15-17 @ 02:36]      Phos  3.1     [12-15-17 @ 02:36]    TPro  6.0  /  Alb  3.7  /  TBili  2.4  /  DBili  0.6  /  AST  54  /  ALT  21  /  AlkPhos  55  [12-14-17 @ 03:47]    PT/INR: PT 13.2 , INR 1.20       [12-14-17 @ 03:47]  PTT: 30.0       [12-14-17 @ 03:47]    Troponin 0.51      [12-14-17 @ 03:47]        [12-14-17 @ 03:47]    Creatinine Trend:  SCr 1.63 [12-15 @ 02:36]  SCr 1.37 [12-14 @ 03:47]  SCr 1.35 [12-13 @ 16:27]  SCr 1.79 [12-13 @ 06:04]  SCr 1.93 [12-12 @ 05:51]    Urinalysis - [12-09-17 @ 12:03]      Color Yellow / Appearance Clear / SG 1.010 / pH 6.0      Gluc Negative / Ketone Negative  / Bili Negative / Urobili Negative       Blood Negative / Protein Negative / Leuk Est Negative / Nitrite Negative      RBC  / WBC 0-2 / Hyaline  / Gran  / Sq Epi  / Non Sq Epi  / Bacteria Few    Urine Creatinine 39      [12-09-17 @ 12:50]  Urine Protein 5.0      [12-09-17 @ 12:50]  Urine Sodium 131      [12-09-17 @ 12:50]  Urine Chloride 128      [12-09-17 @ 12:50]  Urine Osmolality 414      [12-09-17 @ 12:03]    PTH -- (Ca 9.1)      [12-11-17 @ 18:53]   70  PTH -- (Ca 9.2)      [12-10-17 @ 15:41]   66  Vitamin D (25OH) 38.8      [12-11-17 @ 18:43]  HbA1c 6.2      [12-06-17 @ 14:29]  TSH 5.82      [12-08-17 @ 13:36]
Subjective:  :Im already tired of this place...how am I gonna stay another week"  Pt expressing frustration about glucose monitoring, lab draws, attachments to   cardiac monitors    Tele:  AFib                                   T(C): 36.4 (12-11-17 @ 06:24), Max: 36.6 (12-10-17 @ 21:26)  HR: 84 (12-11-17 @ 06:24) (84 - 100)  BP: 104/52 (12-11-17 @ 06:24) (94/60 - 130/64)  RR: 16 (12-11-17 @ 06:24) (16 - 17)  SpO2: 97% (12-11-17 @ 06:24) (97% - 100%)      12-11    144  |  103  |  46.0<H>  ----------------------------<  136<H>  4.6   |  28.0  |  1.94<H>    Ca    8.9      11 Dec 2017 05:36  Phos  3.4     12-10  Mg     2.4     12-10                                 15.2   6.8   )-----------( 126      ( 10 Dec 2017 05:59 )             45.1        PT/INR - ( 11 Dec 2017 05:36 )   PT: 28.8 sec;   INR: 2.57 ratio             CAPILLARY BLOOD GLUCOSE      POCT Blood Glucose.: 141 mg/dL (11 Dec 2017 07:48)  POCT Blood Glucose.: 130 mg/dL (10 Dec 2017 20:55)  POCT Blood Glucose.: 199 mg/dL (10 Dec 2017 17:06)  POCT Blood Glucose.: 173 mg/dL (10 Dec 2017 11:13)             Physical Exam:     GENERAL: Elderly male, in no acute distress, well-developed  HEAD:  Atraumatic, Normocephalic  ENT: EOMI, PERRLA, conjunctiva and sclera clear, Neck supple, No JVD, moist mucosa, no pharyngeal erythema, no tonsillar enlargement or exudate  CHEST/LUNG: Clear to auscultation bilaterally; No wheeze, no rhonchi or rales, no crackles, equal breath sounds bilaterally   HEART: Regular rate and rhythm; No murmurs, rubs, or gallops  ABDOMEN: Soft, Nontender, Nondistended; Bowel sounds present, no organomegaly  EXTREMITIES:  No clubbing, cyanosis, or edema  PSYCH: Nl behavior, nl affect  NEUROLOGY: AAOx3, non-focal, cranial nerves intact  SKIN: Normal color, No rashes or lesions        MEDICATIONS  (STANDING):  ALBUTerol    0.083% 2.5 milliGRAM(s) Nebulizer every 6 hours  amiodarone    Tablet 200 milliGRAM(s) Oral daily  aspirin  chewable 81 milliGRAM(s) Oral daily  atorvastatin 80 milliGRAM(s) Oral at bedtime  buDESOnide  80 MICROgram(s)/formoterol 4.5 MICROgram(s) Inhaler 2 Puff(s) Inhalation two times a day  carvedilol 6.25 milliGRAM(s) Oral every 12 hours  chlorhexidine 0.12% Liquid 15 milliLiter(s) Swish and Spit two times a day  chlorhexidine 4% Liquid 1 Application(s) Topical two times a day  docusate sodium 100 milliGRAM(s) Oral three times a day  furosemide   Injectable 20 milliGRAM(s) IV Push two times a day  insulin lispro (HumaLOG) corrective regimen sliding scale   SubCutaneous Before meals and at bedtime  mupirocin 2% Ointment 1 Application(s) Topical two times a day  sodium chloride 0.9% lock flush 3 milliLiter(s) IV Push every 8 hours  spironolactone 25 milliGRAM(s) Oral daily  tiotropium 18 MICROgram(s) Capsule 1 Capsule(s) Inhalation daily       PAST MEDICAL & SURGICAL HISTORY:  Hypertension  Stroke  CHF (congestive heart failure)  Stented coronary artery  MI, old  Asthma  Atrial fibrillation  Diabetes  Bronchitis  Hyperlipidemia  Pacemaker  COPD (chronic obstructive pulmonary disease)  Cardiac arrhythmia  H/O hernia repair  Cardiac pacemaker
Subjective:  Rehab would help my strength  Wife at bedside      Tele:  AF  80   VPace  70s           V/S:                    T(F): 97.8 (17 @ 10:00), Max: 98.7 (17 @ 21:34)  HR: 71 (17 @ 10:00) (71 - 89)  BP: 100/58 (17 @ 10:00) (90/50 - 123/70)  RR: 17 (17 @ 10:00) (17 - 18)  SpO2: 99% (17 @ 05:37) (98% - 100%)  LV EF: 30%      Labs:      145  |  105  |  46.0<H>  ----------------------------<  132<H>  4.5   |  26.0  |  1.42<H>    Ca    8.7      20 Dec 2017 06:06  Phos  3.4       Mg     2.2                                      12.3   12.1  )-----------( 175      ( 20 Dec 2017 06:06 )             37.2        PT/INR - ( 20 Dec 2017 06:06 )   PT: 17.0 sec;   INR: 1.53 ratio              CAPILLARY BLOOD GLUCOSE      POCT Blood Glucose.: 143 mg/dL (20 Dec 2017 11:56)  POCT Blood Glucose.: 222 mg/dL (20 Dec 2017 07:26)  POCT Blood Glucose.: 99 mg/dL (19 Dec 2017 21:38)  POCT Blood Glucose.: 94 mg/dL (19 Dec 2017 16:44)           CXR:     Hazy bibasilar opacities stable  I&O's Detail    19 Dec 2017 07:01  -  20 Dec 2017 07:00  --------------------------------------------------------  IN:    Oral Fluid: 640 mL  Total IN: 640 mL    OUT:    Voided: 1670 mL  Total OUT: 1670 mL    Total NET: -1030 mL      20 Dec 2017 07:01  -  20 Dec 2017 13:05  --------------------------------------------------------  IN:    Oral Fluid: 240 mL  Total IN: 240 mL    OUT:    Voided: 450 mL  Total OUT: 450 mL    Total NET: -210 mL          CHEST TUBE:  [ ] YES [xx ] NO  OUTPUT:     per 24 hours    AIR LEAKS:  [ ] YES [ ] NO      AURORA DRAIN:   [ ] YES [ x] NO  OUTPUT:     per 24 hours    EPICARDIAL WIRES:  [ ] YES [ x] NO      BOWEL MOVEMENT:  [ x] YES [ ] NO      Daily Weight in k.4 (20 Dec 2017 05:34)  Medications:  ALBUTerol/ipratropium for Nebulization 3 milliLiter(s) Nebulizer every 6 hours PRN  amiodarone    Tablet 200 milliGRAM(s) Oral daily  aspirin enteric coated 325 milliGRAM(s) Oral daily  atorvastatin 80 milliGRAM(s) Oral at bedtime  carvedilol 3.125 milliGRAM(s) Oral every 12 hours  docusate sodium 100 milliGRAM(s) Oral three times a day  enoxaparin Injectable 40 milliGRAM(s) SubCutaneous daily  insulin lispro (HumaLOG) corrective regimen sliding scale   SubCutaneous Before meals and at bedtime  metFORMIN 500 milliGRAM(s) Oral two times a day with meals  oxyCODONE    5 mG/acetaminophen 325 mG 1 Tablet(s) Oral every 4 hours PRN  pantoprazole    Tablet 40 milliGRAM(s) Oral before breakfast  polyethylene glycol 3350 17 Gram(s) Oral daily  silver sulfADIAZINE 1% Cream 1 Application(s) Topical two times a day  tiotropium 18 MICROgram(s) Capsule 1 Capsule(s) Inhalation daily        Physical Exam:    Neuro: alert, pleasant, attention limited when explaining discharge plan    Pulm: loose non productive cough, demonstrates fair use incentive spirometry    CV: S1 S2  RRR    Abd: softly distended  non tender  +  bowel sounds    Extremities:  RLE  EVH  site  incision clean, edges well  proximated    Incision(s): sternum stable, incision clean                 PAST MEDICAL & SURGICAL HISTORY:  Hypertension  Stroke  CHF (congestive heart failure)  Stented coronary artery  MI, old  Asthma  Atrial fibrillation  Diabetes  Bronchitis  Hyperlipidemia  Pacemaker  COPD (chronic obstructive pulmonary disease)  Cardiac arrhythmia  H/O hernia repair  Cardiac pacemaker
Subjective: " When is my surgery?"  pt sitting on the side of the bed NAD Family at bedside     VITAL SIGNS  T(C): 36.4 (12-10-17 @ 16:40), Max: 36.6 (12-09-17 @ 22:10)  HR: 100 (12-10-17 @ 16:40) (87 - 100)  BP: 130/64 (12-10-17 @ 16:40) (94/60 - 130/64)  RR: 16 (12-10-17 @ 16:40) (16 - 18)  SpO2: 100% (12-10-17 @ 16:40) (97% - 100%) 2 L NC   Daily     Daily   Admit Wt: Drug Dosing Weight  Height (cm): 177.8 (08 Dec 2017 08:58)  Weight (kg): 83.9 (08 Dec 2017 08:58)    Telemetry:  Afib  LVEF:  30%    MEDICATIONS  ALBUTerol    0.083% 2.5 milliGRAM(s) Nebulizer every 6 hours  amiodarone    Tablet 200 milliGRAM(s) Oral daily  aspirin  chewable 81 milliGRAM(s) Oral daily  atorvastatin 80 milliGRAM(s) Oral at bedtime  buDESOnide  80 MICROgram(s)/formoterol 4.5 MICROgram(s) Inhaler 2 Puff(s) Inhalation two times a day  carvedilol 6.25 milliGRAM(s) Oral every 12 hours  chlorhexidine 0.12% Liquid 15 milliLiter(s) Swish and Spit two times a day  docusate sodium 100 milliGRAM(s) Oral three times a day  furosemide   Injectable 20 milliGRAM(s) IV Push two times a day  insulin lispro (HumaLOG) corrective regimen sliding scale   SubCutaneous Before meals and at bedtime  lisinopril 5 milliGRAM(s) Oral daily  mupirocin 2% Ointment 1 Application(s) Topical two times a day  sodium chloride 0.9% lock flush 3 milliLiter(s) IV Push every 8 hours  spironolactone 25 milliGRAM(s) Oral daily  tiotropium 18 MICROgram(s) Capsule 1 Capsule(s) Inhalation daily    MEDICATIONS  (PRN):        PHYSICAL EXAM  Neuro: Alert Awake   Pulm: + rhonich,  no rales,  unlabored  CV: Ireg rate S1 S2   Abd: soft NT ND + BS   Ext: no edema, warm      I&O's Detail    09 Dec 2017 07:01  -  10 Dec 2017 07:00  --------------------------------------------------------  IN:    Oral Fluid: 120 mL  Total IN: 120 mL    OUT:    Voided: 1600 mL  Total OUT: 1600 mL    Total NET: -1480 mL      10 Dec 2017 07:01  -  10 Dec 2017 17:50  --------------------------------------------------------  IN:  Total IN: 0 mL    OUT:    Voided: 400 mL  Total OUT: 400 mL    Total NET: -400 mL          LABS  12-10    141  |  102  |  37.0<H>  ----------------------------<  153<H>  5.1   |  27.0  |  1.73<H>    Ca    9.0      10 Dec 2017 05:59  Phos  3.4     12-10  Mg     2.4     12-10                                   15.2   6.8   )-----------( 126      ( 10 Dec 2017 05:59 )             45.1          PT/INR - ( 10 Dec 2017 05:59 )   PT: 34.9 sec;   INR: 3.10 ratio         PTT - ( 09 Dec 2017 03:41 )  PTT:55.4 sec           CAPILLARY BLOOD GLUCOSE      POCT Blood Glucose.: 199 mg/dL (10 Dec 2017 17:06)  POCT Blood Glucose.: 173 mg/dL (10 Dec 2017 11:13)  POCT Blood Glucose.: 152 mg/dL (10 Dec 2017 07:43)  POCT Blood Glucose.: 147 mg/dL (09 Dec 2017 22:21)  POCT Blood Glucose.: 158 mg/dL (09 Dec 2017 21:18)           Today's CXR: < from: Xray Chest 1 View AP/PA. (12.10.17 @ 05:51) >  Single frontal view of the chest demonstrates numerous small bilateral   calcified pulmonary granulomas. No CHF. Left-sided single lead pacemaker.   The cardiomediastinal silhouette is enlarged. No acute osseous   abnormalities. Overlying EKG leads and wires are noted. Consider   noncontrast chest CT.    IMPRESSION: No acute cardiopulmonary disease process. Stigmata of prior   granulomatous disease process.    < end of copied text >      PAST MEDICAL & SURGICAL HISTORY:  Hypertension  Stroke  CHF (congestive heart failure)  Stented coronary artery  MI, old  Asthma  Atrial fibrillation  Diabetes  Bronchitis  Hyperlipidemia  Pacemaker  COPD (chronic obstructive pulmonary disease)  Cardiac arrhythmia  H/O hernia repair  Cardiac pacemake
Subjective: "what's happening...I'm ok" patient oob to chair, seen at the bedside. NAD. Denies CP, SOB, N/V.     VITAL SIGNS  Vital Signs Last 24 Hrs  T(C): 36.8 (17 @ 15:21), Max: 36.8 (17 @ 15:21)  T(F): 98.3 (17 @ 15:21), Max: 98.3 (17 @ 15:21)  HR: 84 (17 @ 17:09) (69 - 91)  BP: 104/64 (17 @ 17:09) (90/50 - 108/62)  RR: 18 (17 @ 17:09) (18 - 18)  SpO2: 98% (17 @ 17:09) (97% - 100%)  on 3L NC              Telemetry/Alarms:   with AF breakthrough, one desat to 83%  LVEF: 30%    MEDICATIONS  ALBUTerol/ipratropium for Nebulization 3 milliLiter(s) Nebulizer every 6 hours PRN  amiodarone    Tablet 200 milliGRAM(s) Oral daily  aspirin enteric coated 325 milliGRAM(s) Oral daily  atorvastatin 80 milliGRAM(s) Oral at bedtime  carvedilol 3.125 milliGRAM(s) Oral every 12 hours  docusate sodium 100 milliGRAM(s) Oral three times a day  enoxaparin Injectable 40 milliGRAM(s) SubCutaneous daily  insulin lispro (HumaLOG) corrective regimen sliding scale   SubCutaneous Before meals and at bedtime  metFORMIN 500 milliGRAM(s) Oral two times a day with meals  pantoprazole    Tablet 40 milliGRAM(s) Oral before breakfast  polyethylene glycol 3350 17 Gram(s) Oral daily  silver sulfADIAZINE 1% Cream 1 Application(s) Topical two times a day  tiotropium 18 MICROgram(s) Capsule 1 Capsule(s) Inhalation daily  warfarin 2.5 milliGRAM(s) Oral once      PHYSICAL EXAM  General: well nourished, well developed, no acute distress  Neurology: alert and oriented x 3, nonfocal, no gross deficits  Respiratory: scattered crackles right base, otherwise clear  CV: irregular rate and rhythm, normal S1, S2  Abdomen: soft, nontender, nondistended, positive bowel sounds, last bowel movement today  Extremities: warm, well perfused. trace edema. + DP pulses  Incisions: midline sternal incision, , c/d/i. sternum stable.   L thigh EVH c/d/i       @ 07:01  -   @ 07:00  --------------------------------------------------------  IN: 360 mL / OUT: 1000 mL / NET: -640 mL     @ 07:01  -   @ 18:25  --------------------------------------------------------  IN: 480 mL / OUT: 670 mL / NET: -190 mL        Weights:  Daily     Daily Weight in k.2 (19 Dec 2017 05:00)  Admit Wt: Drug Dosing Weight  Height (cm): 177.8 (13 Dec 2017 08:03)  Weight (kg): 83.9 (13 Dec 2017 08:03)  BMI (kg/m2): 26.5 (13 Dec 2017 08:03)  BSA (m2): 2.02 (13 Dec 2017 08:03)    LABS      145  |  105  |  46.0<H>  ----------------------------<  104  4.2   |  28.0  |  1.36<H>    Ca    8.6      19 Dec 2017 06:07  Phos  3.4       Mg     2.1                                        12.0   11.0  )-----------( 142      ( 19 Dec 2017 06:07 )             36.2          PT/INR - ( 19 Dec 2017 06:07 )   PT: 15.8 sec;   INR: 1.43 ratio             CAPILLARY BLOOD GLUCOSE      POCT Blood Glucose.: 94 mg/dL (19 Dec 2017 16:44)  POCT Blood Glucose.: 255 mg/dL (19 Dec 2017 11:34)  POCT Blood Glucose.: 121 mg/dL (19 Dec 2017 07:17)  POCT Blood Glucose.: 113 mg/dL (18 Dec 2017 22:15)           Today's CXR: < from: Xray Chest 1 View AP/PA. (17 @ 11:27) >    IMPRESSION:     Cardiomegaly.    No infiltrates.    < end of copied text >      Today's EKG: < from: 12 Lead ECG (12.15.17 @ 06:06) >    Diagnosis Line Atrial fibrillation with rapid ventricular response  Septal infarct , age undetermined  Abnormal ECG    < end of copied text >      PAST MEDICAL & SURGICAL HISTORY:  Hypertension  Stroke  CHF (congestive heart failure)  Stented coronary artery  MI, old  Asthma  Atrial fibrillation  Diabetes  Bronchitis  Hyperlipidemia  Pacemaker  COPD (chronic obstructive pulmonary disease)  Cardiac arrhythmia  H/O hernia repair  Cardiac pacemaker
Subjective:  77yMale COPD, acute on chronic heart failure, + CAD    "I want to go home and come back for the surgery"  denies active resting sob or chest pain    Past Medical History:  Atherosclerosis of native coronary artery without angina pectoris  H/o or current diagnosis of HF- Contraindication to ACEI/ARBs  H/o or current diagnosis of HF- no contraindication to ACEI/ARBs  H/o or current diagnosis of HF- Contraindication to ACEI/ARBs  H/o or current diagnosis of HF- ACEI/ARB contraindication unknown  Family history unknown  Hypertension  Stroke  CHF (congestive heart failure)  Stented coronary artery  MI, old  Asthma  Atrial fibrillation  Diabetes  Bronchitis  Hyperlipidemia  Pacemaker  COPD (chronic obstructive pulmonary disease)  Cardiac arrhythmia  Non-rheumatic mitral regurgitation  Acute on chronic combined systolic and diastolic congestive heart failure  Chronic atrial fibrillation  Atherosclerosis of native coronary artery of native heart without angina pectoris  H/O hernia repair  Cardiac pacemaker  ATHSCL HEART DISEASE OF NATIVE        ALBUTerol    0.083% 2.5 milliGRAM(s) Nebulizer every 6 hours  amiodarone    Tablet 200 milliGRAM(s) Oral daily  atorvastatin 80 milliGRAM(s) Oral at bedtime  buDESOnide  80 MICROgram(s)/formoterol 4.5 MICROgram(s) Inhaler 2 Puff(s) Inhalation two times a day  carvedilol 6.25 milliGRAM(s) Oral every 12 hours  furosemide   Injectable 20 milliGRAM(s) IV Push two times a day  insulin lispro (HumaLOG) corrective regimen sliding scale   SubCutaneous Before meals and at bedtime  lisinopril 5 milliGRAM(s) Oral daily  mupirocin 2% Ointment 1 Application(s) Topical two times a day  sodium chloride 0.9% lock flush 3 milliLiter(s) IV Push every 8 hours  tiotropium 18 MICROgram(s) Capsule 1 Capsule(s) Inhalation daily  MEDICATIONS  (PRN):    Height (cm): 177.8 ( @ 08:58), 167.64 ( @ :07)  Weight (kg): 83.9 (:58), 84.4 ( @ 04:07)  BMI (kg/m2): 26.5 (:58), 30 ( @ :07)  BSA (m2): 2.02 (12-08 @ 08:58), 1.94 ( @ 04:07)  Daily Height in cm: 177.8 (08 Dec 2017 08:58)    Daily Weight in k.9 (08 Dec 2017 08:58)                              15.0   5.8   )-----------( 127      ( 08 Dec 2017 13:36 )             44.0       143  |  104  |  27.0<H>  ----------------------------<  162<H>  4.1   |  24.0  |  1.41<H>    Ca    8.7      08 Dec 2017 13:36  Phos  2.9       Mg     1.8         TPro  7.0  /  Alb  3.9  /  TBili  2.2<H>  /  DBili  0.2  /  AST  19  /  ALT  20  /  AlkPhos  90      CARDIAC MARKERS ( 08 Dec 2017 13:36 )  x     / <0.01 ng/mL / 37 U/L / x     / x      CARDIAC MARKERS ( 08 Dec 2017 04:21 )  .004 ng/mL / x     / 45 U/L / x     / 0.7 ng/mL    PT/INR - ( 08 Dec 2017 13:36 )   PT: 45.1 sec;   INR: 3.99 ratio         PTT - ( 08 Dec 2017 13:36 )  PTT:52.3 sec      Objective:  T(C): 36.8 (17 @ 01:00), Max: 36.8 (17 @ 01:00)  HR: 94 (17 @ 01:00) (80 - 124)  BP: 110/55 (17 @ 01:00) (95/52 - 156/106)  RR: 12 (17 @ 01:00) (12 - 33)  SpO2: 96% (17 @ 01:00) (96% - 100%)  Wt(kg): --CAPILLARY BLOOD GLUCOSE      POCT Blood Glucose.: 140 mg/dL (08 Dec 2017 21:04)  POCT Blood Glucose.: 105 mg/dL (08 Dec 2017 15:41)  POCT Blood Glucose.: 152 mg/dL (08 Dec 2017 10:58)  I&O's Summary    08 Dec 2017 07:01  -  09 Dec 2017 01:06  --------------------------------------------------------  IN: 0 mL / OUT: 2100 mL / NET: -2100 mL        Physical Exam:  Neuro: a0x3  Pulm: + rhonich,  no rales,  unlabored  CV: s1/s2  Abd: soft nt/nd  Ext: no edema, warm
Subjective: "  I have to go to rehab??"  PT in chair, Family at bedside.  NAD.  Walked 35 ft with PT today.      VITAL SIGNS  T(C): 36.4 (17 @ 15:51), Max: 36.7 (17 @ 10:00)  HR: 80 (17 @ 15:51) (80 - 95)  BP: 97/66 (17 @ 15:51) (97/58 - 120/79)  RR: 18 (17 @ 15:51) (18 - 24)  SpO2: 100% (17 @ 08:31) (94% - 100%) 2 L NC        Daily     Daily Weight in k.7 (18 Dec 2017 05:17)  Admit Wt: Drug Dosing Weight  Height (cm): 177.8 (13 Dec 2017 08:03)  Weight (kg): 83.9 (13 Dec 2017 08:03)    Telemetry:   VPACED    LVEF 30%    MEDICATIONS  ALBUTerol/ipratropium for Nebulization 3 milliLiter(s) Nebulizer every 6 hours PRN  amiodarone    Tablet 200 milliGRAM(s) Oral daily  aspirin enteric coated 325 milliGRAM(s) Oral daily  atorvastatin 80 milliGRAM(s) Oral at bedtime  carvedilol 3.125 milliGRAM(s) Oral every 12 hours  docusate sodium 100 milliGRAM(s) Oral three times a day  enoxaparin Injectable 40 milliGRAM(s) SubCutaneous daily  furosemide    Tablet 40 milliGRAM(s) Oral daily  insulin lispro (HumaLOG) corrective regimen sliding scale   SubCutaneous Before meals and at bedtime  metFORMIN 500 milliGRAM(s) Oral two times a day with meals  pantoprazole    Tablet 40 milliGRAM(s) Oral before breakfast  polyethylene glycol 3350 17 Gram(s) Oral daily  silver sulfADIAZINE 1% Cream 1 Application(s) Topical two times a day  spironolactone 25 milliGRAM(s) Oral daily  tiotropium 18 MICROgram(s) Capsule 1 Capsule(s) Inhalation daily  warfarin 2.5 milliGRAM(s) Oral once    MEDICATIONS  (PRN):  ALBUTerol/ipratropium for Nebulization 3 milliLiter(s) Nebulizer every 6 hours PRN SOB/wheezing        PHYSICAL EXAM  Gen: A&Ox3 has some mild confusion at times   Pulm:  course rhochi noted b/l with cough   CV:  S1S2, RRR,  Abd: +BS hypoactive, soft, NT, ND + BM yesterday   Ext:  +DP b/l, 3+edema b/l  Incision:  MSI C/D/I  stable sternum EVH site C/D/I    Skin:  + large Open blister noted under right thoracic area       I&O's Detail    17 Dec 2017 07:01  -  18 Dec 2017 07:00  --------------------------------------------------------  IN:  Total IN: 0 mL    OUT:    Chest Tube: 50 mL    Voided: 275 mL  Total OUT: 325 mL    Total NET: -325 mL      18 Dec 2017 07:01  -  18 Dec 2017 17:02  --------------------------------------------------------  IN:    Oral Fluid: 360 mL  Total IN: 360 mL    OUT:    Voided: 700 mL  Total OUT: 700 mL    Total NET: -340 mL          LABS      143  |  103  |  42.0<H>  ----------------------------<  117<H>  4.5   |  27.0  |  1.29    Ca    9.0      18 Dec 2017 05:56  Phos  2.6       Mg     2.3                                        11.9   13.0  )-----------( 146      ( 18 Dec 2017 05:56 )             35.6          PT/INR - ( 18 Dec 2017 05:56 )   PT: 14.0 sec;   INR: 1.27 ratio         PTT - ( 17 Dec 2017 04:47 )  PTT:26.6 sec           CAPILLARY BLOOD GLUCOSE      POCT Blood Glucose.: 200 mg/dL (18 Dec 2017 11:37)  POCT Blood Glucose.: 129 mg/dL (18 Dec 2017 07:25)  POCT Blood Glucose.: 127 mg/dL (17 Dec 2017 21:47)           Today's CXR:  < from: Xray Chest 1 View AP/PA. (1218.17 @ 05:00) >  Findings: Left lung is clear. Small right pleural effusion. Subsegmental   atelectasis right lower lobe. No significant pulmonic congestion.     Status post median sternotomy. No change in pacemaker wire position in   right ventricle. Prosthetic aortic and mitral valves. Small clip on left   atrial appendage Normal for age.    Impression: Pacemaker. Prosthetic aortic and mitral valves.    Subsegmental atelectasis right lower lobe and small right pleural effusion    < end of copied text >      PAST MEDICAL & SURGICAL HISTORY:  Hypertension  Stroke  CHF (congestive heart failure)  Stented coronary artery  MI, old  Asthma  Atrial fibrillation  Diabetes  Bronchitis  Hyperlipidemia  Pacemaker  COPD (chronic obstructive pulmonary disease)  Cardiac arrhythmia  H/O hernia repair  Cardiac pacemaker

## 2017-12-20 NOTE — PROGRESS NOTE ADULT - PROBLEM SELECTOR PROBLEM 1
Atrial fibrillation
Atrial fibrillation
Coronary artery disease involving native coronary artery of native heart without angina pectoris
Coronary artery disease involving native coronary artery of native heart without angina pectoris
Acute on chronic systolic CHF (congestive heart failure), NYHA class 3
Acute on chronic systolic CHF (congestive heart failure), NYHA class 3
CAD (coronary artery disease)
CAD (coronary artery disease)
Coronary artery disease involving native coronary artery of native heart without angina pectoris
Acute on chronic systolic CHF (congestive heart failure), NYHA class 3

## 2017-12-20 NOTE — PROGRESS NOTE ADULT - PROBLEM SELECTOR PLAN 3
Pulm not aprpeacaited  cont spiriva, symbicort, albuterol
s/p MVR (T)
Pressure labile, continue levophed
sp ohs  tight glucose control  pt  oob ambulate with assistance  dvt ppx  pain mgmt  incentive spirometry, chest pt.  ?rhonchi b/l?  f/u cxr in Am
Maintain sbp<130. use cardene PRN
Off levophed, restart beta blocker / antihypertensives when off of inotropic support
Pulm not appreciated  cont spiriva, symbicort, albuterol
Pulm not aprpeacaited  cont spiriva, symbicort, albuterol
s/p MVR (T)  as above
s/p MVR (T)  as above

## 2017-12-20 NOTE — PROGRESS NOTE ADULT - PROBLEM SELECTOR PLAN 2
supratheraptuic INR, shaina ctive bleed  hold coumadin in preps for OR  hep gtt once INR <1.8
cont lasix & aldactone   f/u lytes  supportive O2 as tolerated  cont coreg
Continue inotropic support.  Will add milrinone
Wean inotropic support as tolerated
intermittent lasix  f/u lytes  supportive O2 as tolerated  consider coreg in AM, BB previously held due to inotropic support which was weaned off yesterday evening.  Continue to monitor CVP, CI, SVV, U/O to assess patient tolerance of no inotropic support.  If able, start BB in Am.
Wean inotropic support as tolerated
s/p AVR (T)  as above
s/p AVR (T)  as above
supratheraptuic INR, no active bleeding   hold coumadin in preps for OR  hep gtt once INR <1.8
supratheraptuic INR, no active bleeding   hold coumadin in preps for OR  will discuss w/ Dr Denny ? Vit K  hep gtt once INR <1.8

## 2017-12-21 PROCEDURE — 96116 NUBHVL XM PHYS/QHP 1ST HR: CPT

## 2017-12-21 PROCEDURE — 99223 1ST HOSP IP/OBS HIGH 75: CPT

## 2017-12-21 PROCEDURE — 99221 1ST HOSP IP/OBS SF/LOW 40: CPT

## 2017-12-21 PROCEDURE — 93010 ELECTROCARDIOGRAM REPORT: CPT

## 2017-12-22 PROBLEM — I35.0 AORTIC VALVE STENOSIS: Status: ACTIVE | Noted: 2017-12-22

## 2017-12-22 PROBLEM — I34.0 MITRAL REGURGITATION: Status: ACTIVE | Noted: 2017-12-22

## 2017-12-22 PROCEDURE — 99232 SBSQ HOSP IP/OBS MODERATE 35: CPT

## 2017-12-22 PROCEDURE — 99239 HOSP IP/OBS DSCHRG MGMT >30: CPT

## 2017-12-22 RX ORDER — MUPIROCIN 20 MG/G
2 OINTMENT TOPICAL TWICE DAILY
Qty: 1 | Refills: 0 | Status: DISCONTINUED | COMMUNITY
Start: 2017-12-07 | End: 2017-12-22

## 2017-12-22 RX ORDER — OXYCODONE HYDROCHLORIDE AND ACETAMINOPHEN 5; 325 MG/1; MG/1
5-325 TABLET ORAL
Refills: 0 | Status: ACTIVE | COMMUNITY

## 2017-12-22 RX ORDER — BUDESONIDE AND FORMOTEROL FUMARATE DIHYDRATE 160; 4.5 UG/1; UG/1
160-4.5 AEROSOL RESPIRATORY (INHALATION)
Refills: 0 | Status: DISCONTINUED | COMMUNITY
End: 2017-12-22

## 2017-12-22 RX ORDER — FUROSEMIDE 20 MG/1
20 TABLET ORAL
Refills: 0 | Status: ACTIVE | COMMUNITY

## 2017-12-22 RX ORDER — ATORVASTATIN CALCIUM 80 MG/1
80 TABLET, FILM COATED ORAL
Refills: 0 | Status: ACTIVE | COMMUNITY

## 2017-12-22 RX ORDER — PANTOPRAZOLE SODIUM 40 MG/1
40 GRANULE, DELAYED RELEASE ORAL
Refills: 0 | Status: ACTIVE | COMMUNITY

## 2017-12-22 RX ORDER — CARVEDILOL 6.25 MG/1
6.25 TABLET, FILM COATED ORAL
Refills: 0 | Status: DISCONTINUED | COMMUNITY
End: 2017-12-22

## 2017-12-22 RX ORDER — CARVEDILOL 3.12 MG/1
3.12 TABLET, FILM COATED ORAL
Refills: 0 | Status: ACTIVE | COMMUNITY

## 2017-12-22 RX ORDER — INSULIN ISOPHANE,PORK PURE 100/ML
VIAL (ML) SUBCUTANEOUS
Refills: 0 | Status: ACTIVE | COMMUNITY

## 2017-12-27 ENCOUNTER — RECORD ABSTRACTING (OUTPATIENT)
Age: 77
End: 2017-12-27

## 2017-12-29 PROCEDURE — 97163 PT EVAL HIGH COMPLEX 45 MIN: CPT

## 2017-12-29 PROCEDURE — 97530 THERAPEUTIC ACTIVITIES: CPT

## 2017-12-29 PROCEDURE — 97535 SELF CARE MNGMENT TRAINING: CPT

## 2017-12-29 PROCEDURE — 97110 THERAPEUTIC EXERCISES: CPT

## 2017-12-29 PROCEDURE — 93005 ELECTROCARDIOGRAM TRACING: CPT

## 2017-12-29 PROCEDURE — 97116 GAIT TRAINING THERAPY: CPT

## 2017-12-29 PROCEDURE — 81003 URINALYSIS AUTO W/O SCOPE: CPT

## 2017-12-29 PROCEDURE — 83036 HEMOGLOBIN GLYCOSYLATED A1C: CPT

## 2017-12-29 PROCEDURE — 94640 AIRWAY INHALATION TREATMENT: CPT

## 2017-12-29 PROCEDURE — 82607 VITAMIN B-12: CPT

## 2017-12-29 PROCEDURE — 83735 ASSAY OF MAGNESIUM: CPT

## 2017-12-29 PROCEDURE — 85610 PROTHROMBIN TIME: CPT

## 2017-12-29 PROCEDURE — 97167 OT EVAL HIGH COMPLEX 60 MIN: CPT

## 2017-12-29 PROCEDURE — 85025 COMPLETE CBC W/AUTO DIFF WBC: CPT

## 2017-12-29 PROCEDURE — 84100 ASSAY OF PHOSPHORUS: CPT

## 2017-12-29 PROCEDURE — 80053 COMPREHEN METABOLIC PANEL: CPT

## 2017-12-29 PROCEDURE — 82746 ASSAY OF FOLIC ACID SERUM: CPT

## 2017-12-29 PROCEDURE — 84443 ASSAY THYROID STIM HORMONE: CPT

## 2018-01-05 ENCOUNTER — APPOINTMENT (OUTPATIENT)
Dept: CARDIOTHORACIC SURGERY | Facility: CLINIC | Age: 78
End: 2018-01-05
Payer: MEDICARE

## 2018-01-05 VITALS
RESPIRATION RATE: 16 BRPM | HEART RATE: 96 BPM | WEIGHT: 200 LBS | OXYGEN SATURATION: 98 % | HEIGHT: 68 IN | SYSTOLIC BLOOD PRESSURE: 118 MMHG | DIASTOLIC BLOOD PRESSURE: 80 MMHG | BODY MASS INDEX: 30.31 KG/M2

## 2018-01-05 PROCEDURE — 99024 POSTOP FOLLOW-UP VISIT: CPT

## 2018-01-05 NOTE — COUNSELING
[Hygeine (Including Daily Shower)] : hygeine (including daily shower) [Importance of Regular Medical Follow-Up] : the importance of regular medical follow-up [No Heavy Lifting] : no heavy lifting (>15-20 lb. for 1 month or 25 lb. for 3 months from date of surgery) [Blood Pressure Control] : blood pressure control [S/S of infection] : signs and symptoms of infection (and to whom it should be reported) [Progressive Ambulation/Activity] : progressive ambulation/activity [Medication/Vitamin/Herb/Food Interaction] : medication/vitamin/herb/food interaction [SBE antibiotic prophylaxis] : SBE antibiotic prophylaxis was recommended

## 2018-01-08 NOTE — CONSULT LETTER
[Dear  ___] : Dear  [unfilled], [Courtesy Letter:] : I had the pleasure of seeing your patient, [unfilled], in my office today. [Please see my note below.] : Please see my note below. [Consult Closing:] : Thank you very much for allowing me to participate in the care of this patient.  If you have any questions, please do not hesitate to contact me. [Sincerely,] : Sincerely, [Americo Denny MD] : Americo Denny MD [Chief] : Chief [[facility]] : [facility] [FreeTextEntry2] : Dr.Paul Gloria\par [address] [place] [address] Rd[address]par [place] [id number]

## 2019-12-09 NOTE — DIETITIAN INITIAL EVALUATION ADULT. - SIGNS/SYMPTOMS
Resume regular dialysis session this week.  Follow up with your Primary Care Doctor, Cardiologist, and Pulmonologist within 1 week.  Follow up with your Endocrinologist and Nephrologist within 2 weeks. as evidenced by elevated glucose value, lack of concern for amount of CHO consumed at each meal denies pain/discomfort

## 2020-12-16 NOTE — DIETITIAN INITIAL EVALUATION ADULT. - NS FNS WEIGHT USED FOR CALC
current Opioid Pregnancy And Lactation Text: These medications can lead to premature delivery and should be avoided during pregnancy. These medications are also present in breast milk in small amounts.

## 2022-01-01 ENCOUNTER — TRANSCRIPTION ENCOUNTER (OUTPATIENT)
Age: 82
End: 2022-01-01

## 2022-01-01 ENCOUNTER — INPATIENT (INPATIENT)
Facility: HOSPITAL | Age: 82
LOS: 8 days | Discharge: NURSING, REHAB OR LONG-TERM CARE | DRG: 204 | End: 2022-12-01
Attending: INTERNAL MEDICINE | Admitting: INTERNAL MEDICINE
Payer: COMMERCIAL

## 2022-01-01 VITALS
OXYGEN SATURATION: 98 % | DIASTOLIC BLOOD PRESSURE: 62 MMHG | TEMPERATURE: 97 F | HEART RATE: 70 BPM | RESPIRATION RATE: 18 BRPM | SYSTOLIC BLOOD PRESSURE: 122 MMHG

## 2022-01-01 VITALS
TEMPERATURE: 98 F | DIASTOLIC BLOOD PRESSURE: 75 MMHG | HEART RATE: 78 BPM | SYSTOLIC BLOOD PRESSURE: 139 MMHG | RESPIRATION RATE: 16 BRPM | OXYGEN SATURATION: 96 %

## 2022-01-01 DIAGNOSIS — Z79.84 LONG TERM (CURRENT) USE OF ORAL HYPOGLYCEMIC DRUGS: ICD-10-CM

## 2022-01-01 DIAGNOSIS — J94.1 FIBROTHORAX: ICD-10-CM

## 2022-01-01 DIAGNOSIS — J44.9 CHRONIC OBSTRUCTIVE PULMONARY DISEASE, UNSPECIFIED: ICD-10-CM

## 2022-01-01 DIAGNOSIS — Z86.73 PERSONAL HISTORY OF TRANSIENT ISCHEMIC ATTACK (TIA), AND CEREBRAL INFARCTION WITHOUT RESIDUAL DEFICITS: ICD-10-CM

## 2022-01-01 DIAGNOSIS — Z95.2 PRESENCE OF PROSTHETIC HEART VALVE: ICD-10-CM

## 2022-01-01 DIAGNOSIS — E11.9 TYPE 2 DIABETES MELLITUS WITHOUT COMPLICATIONS: ICD-10-CM

## 2022-01-01 DIAGNOSIS — Z98.890 OTHER SPECIFIED POSTPROCEDURAL STATES: Chronic | ICD-10-CM

## 2022-01-01 DIAGNOSIS — J90 PLEURAL EFFUSION, NOT ELSEWHERE CLASSIFIED: ICD-10-CM

## 2022-01-01 DIAGNOSIS — I25.10 ATHEROSCLEROTIC HEART DISEASE OF NATIVE CORONARY ARTERY WITHOUT ANGINA PECTORIS: ICD-10-CM
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
## 2022-01-01 DIAGNOSIS — I48.91 UNSPECIFIED ATRIAL FIBRILLATION: ICD-10-CM

## 2022-01-01 DIAGNOSIS — N40.0 BENIGN PROSTATIC HYPERPLASIA WITHOUT LOWER URINARY TRACT SYMPTOMS: ICD-10-CM

## 2022-01-01 DIAGNOSIS — Z95.0 PRESENCE OF CARDIAC PACEMAKER: ICD-10-CM

## 2022-01-01 DIAGNOSIS — J94.8 OTHER SPECIFIED PLEURAL CONDITIONS: ICD-10-CM

## 2022-01-01 DIAGNOSIS — F43.20 ADJUSTMENT DISORDER, UNSPECIFIED: ICD-10-CM

## 2022-01-01 DIAGNOSIS — I48.0 PAROXYSMAL ATRIAL FIBRILLATION: ICD-10-CM

## 2022-01-01 DIAGNOSIS — R55 SYNCOPE AND COLLAPSE: ICD-10-CM

## 2022-01-01 DIAGNOSIS — K80.20 CALCULUS OF GALLBLADDER WITHOUT CHOLECYSTITIS WITHOUT OBSTRUCTION: ICD-10-CM

## 2022-01-01 DIAGNOSIS — Z88.0 ALLERGY STATUS TO PENICILLIN: ICD-10-CM

## 2022-01-01 DIAGNOSIS — I11.0 HYPERTENSIVE HEART DISEASE WITH HEART FAILURE: ICD-10-CM

## 2022-01-01 DIAGNOSIS — I38 ENDOCARDITIS, VALVE UNSPECIFIED: ICD-10-CM

## 2022-01-01 DIAGNOSIS — Z79.4 LONG TERM (CURRENT) USE OF INSULIN: ICD-10-CM

## 2022-01-01 DIAGNOSIS — E78.5 HYPERLIPIDEMIA, UNSPECIFIED: ICD-10-CM

## 2022-01-01 DIAGNOSIS — I50.9 HEART FAILURE, UNSPECIFIED: ICD-10-CM

## 2022-01-01 DIAGNOSIS — Z79.82 LONG TERM (CURRENT) USE OF ASPIRIN: ICD-10-CM

## 2022-01-01 DIAGNOSIS — Z95.0 PRESENCE OF CARDIAC PACEMAKER: Chronic | ICD-10-CM

## 2022-01-01 DIAGNOSIS — Z79.01 LONG TERM (CURRENT) USE OF ANTICOAGULANTS: ICD-10-CM

## 2022-01-01 DIAGNOSIS — Z98.61 CORONARY ANGIOPLASTY STATUS: ICD-10-CM

## 2022-01-01 LAB
A1C WITH ESTIMATED AVERAGE GLUCOSE RESULT: 5.8 % — HIGH (ref 4–5.6)
ALBUMIN SERPL ELPH-MCNC: 2.7 G/DL — LOW (ref 3.3–5)
ALBUMIN SERPL ELPH-MCNC: 2.7 G/DL — LOW (ref 3.3–5)
ALP SERPL-CCNC: 86 U/L — SIGNIFICANT CHANGE UP (ref 40–120)
ALP SERPL-CCNC: 95 U/L — SIGNIFICANT CHANGE UP (ref 40–120)
ALT FLD-CCNC: 15 U/L — SIGNIFICANT CHANGE UP (ref 12–78)
ALT FLD-CCNC: 20 U/L — SIGNIFICANT CHANGE UP (ref 12–78)
ANION GAP SERPL CALC-SCNC: 2 MMOL/L — LOW (ref 5–17)
ANION GAP SERPL CALC-SCNC: 3 MMOL/L — LOW (ref 5–17)
ANION GAP SERPL CALC-SCNC: 4 MMOL/L — LOW (ref 5–17)
ANION GAP SERPL CALC-SCNC: 4 MMOL/L — LOW (ref 5–17)
ANION GAP SERPL CALC-SCNC: 6 MMOL/L — SIGNIFICANT CHANGE UP (ref 5–17)
APPEARANCE UR: CLEAR — SIGNIFICANT CHANGE UP
APTT BLD: 34.6 SEC — SIGNIFICANT CHANGE UP (ref 27.5–35.5)
APTT BLD: 36.7 SEC — HIGH (ref 27.5–35.5)
APTT BLD: 38.1 SEC — HIGH (ref 27.5–35.5)
APTT BLD: 39.5 SEC — HIGH (ref 27.5–35.5)
APTT BLD: 39.6 SEC — HIGH (ref 27.5–35.5)
APTT BLD: 40.2 SEC — HIGH (ref 27.5–35.5)
APTT BLD: 42.1 SEC — HIGH (ref 27.5–35.5)
APTT BLD: 44.1 SEC — HIGH (ref 27.5–35.5)
AST SERPL-CCNC: 16 U/L — SIGNIFICANT CHANGE UP (ref 15–37)
AST SERPL-CCNC: 18 U/L — SIGNIFICANT CHANGE UP (ref 15–37)
BASOPHILS # BLD AUTO: 0.02 K/UL — SIGNIFICANT CHANGE UP (ref 0–0.2)
BASOPHILS # BLD AUTO: 0.03 K/UL — SIGNIFICANT CHANGE UP (ref 0–0.2)
BASOPHILS NFR BLD AUTO: 0.3 % — SIGNIFICANT CHANGE UP (ref 0–2)
BASOPHILS NFR BLD AUTO: 0.4 % — SIGNIFICANT CHANGE UP (ref 0–2)
BILIRUB SERPL-MCNC: 0.8 MG/DL — SIGNIFICANT CHANGE UP (ref 0.2–1.2)
BILIRUB SERPL-MCNC: 1 MG/DL — SIGNIFICANT CHANGE UP (ref 0.2–1.2)
BILIRUB UR-MCNC: NEGATIVE — SIGNIFICANT CHANGE UP
BUN SERPL-MCNC: 15 MG/DL — SIGNIFICANT CHANGE UP (ref 7–23)
BUN SERPL-MCNC: 18 MG/DL — SIGNIFICANT CHANGE UP (ref 7–23)
BUN SERPL-MCNC: 20 MG/DL — SIGNIFICANT CHANGE UP (ref 7–23)
CALCIUM SERPL-MCNC: 7.9 MG/DL — LOW (ref 8.5–10.1)
CALCIUM SERPL-MCNC: 8.3 MG/DL — LOW (ref 8.5–10.1)
CALCIUM SERPL-MCNC: 8.3 MG/DL — LOW (ref 8.5–10.1)
CALCIUM SERPL-MCNC: 8.4 MG/DL — LOW (ref 8.5–10.1)
CALCIUM SERPL-MCNC: 8.7 MG/DL — SIGNIFICANT CHANGE UP (ref 8.5–10.1)
CHLORIDE SERPL-SCNC: 104 MMOL/L — SIGNIFICANT CHANGE UP (ref 96–108)
CHLORIDE SERPL-SCNC: 105 MMOL/L — SIGNIFICANT CHANGE UP (ref 96–108)
CHLORIDE SERPL-SCNC: 106 MMOL/L — SIGNIFICANT CHANGE UP (ref 96–108)
CHLORIDE SERPL-SCNC: 106 MMOL/L — SIGNIFICANT CHANGE UP (ref 96–108)
CHLORIDE SERPL-SCNC: 107 MMOL/L — SIGNIFICANT CHANGE UP (ref 96–108)
CHOLEST SERPL-MCNC: 84 MG/DL — SIGNIFICANT CHANGE UP
CO2 SERPL-SCNC: 26 MMOL/L — SIGNIFICANT CHANGE UP (ref 22–31)
CO2 SERPL-SCNC: 27 MMOL/L — SIGNIFICANT CHANGE UP (ref 22–31)
CO2 SERPL-SCNC: 27 MMOL/L — SIGNIFICANT CHANGE UP (ref 22–31)
CO2 SERPL-SCNC: 28 MMOL/L — SIGNIFICANT CHANGE UP (ref 22–31)
CO2 SERPL-SCNC: 31 MMOL/L — SIGNIFICANT CHANGE UP (ref 22–31)
COLOR SPEC: YELLOW — SIGNIFICANT CHANGE UP
CREAT SERPL-MCNC: 0.79 MG/DL — SIGNIFICANT CHANGE UP (ref 0.5–1.3)
CREAT SERPL-MCNC: 0.81 MG/DL — SIGNIFICANT CHANGE UP (ref 0.5–1.3)
CREAT SERPL-MCNC: 0.91 MG/DL — SIGNIFICANT CHANGE UP (ref 0.5–1.3)
CREAT SERPL-MCNC: 0.96 MG/DL — SIGNIFICANT CHANGE UP (ref 0.5–1.3)
CREAT SERPL-MCNC: 0.97 MG/DL — SIGNIFICANT CHANGE UP (ref 0.5–1.3)
DIFF PNL FLD: NEGATIVE — SIGNIFICANT CHANGE UP
EGFR: 78 ML/MIN/1.73M2 — SIGNIFICANT CHANGE UP
EGFR: 79 ML/MIN/1.73M2 — SIGNIFICANT CHANGE UP
EGFR: 85 ML/MIN/1.73M2 — SIGNIFICANT CHANGE UP
EGFR: 89 ML/MIN/1.73M2 — SIGNIFICANT CHANGE UP
EGFR: 89 ML/MIN/1.73M2 — SIGNIFICANT CHANGE UP
EOSINOPHIL # BLD AUTO: 0.1 K/UL — SIGNIFICANT CHANGE UP (ref 0–0.5)
EOSINOPHIL # BLD AUTO: 0.13 K/UL — SIGNIFICANT CHANGE UP (ref 0–0.5)
EOSINOPHIL NFR BLD AUTO: 1.3 % — SIGNIFICANT CHANGE UP (ref 0–6)
EOSINOPHIL NFR BLD AUTO: 1.6 % — SIGNIFICANT CHANGE UP (ref 0–6)
ESTIMATED AVERAGE GLUCOSE: 120 MG/DL — HIGH (ref 68–114)
ETHANOL SERPL-MCNC: <10 MG/DL — SIGNIFICANT CHANGE UP (ref 0–10)
GLUCOSE BLDC GLUCOMTR-MCNC: 101 MG/DL — HIGH (ref 70–99)
GLUCOSE SERPL-MCNC: 104 MG/DL — HIGH (ref 70–99)
GLUCOSE SERPL-MCNC: 105 MG/DL — HIGH (ref 70–99)
GLUCOSE SERPL-MCNC: 137 MG/DL — HIGH (ref 70–99)
GLUCOSE SERPL-MCNC: 87 MG/DL — SIGNIFICANT CHANGE UP (ref 70–99)
GLUCOSE SERPL-MCNC: 98 MG/DL — SIGNIFICANT CHANGE UP (ref 70–99)
GLUCOSE UR QL: NEGATIVE — SIGNIFICANT CHANGE UP
HCT VFR BLD CALC: 37.2 % — LOW (ref 39–50)
HCT VFR BLD CALC: 37.7 % — LOW (ref 39–50)
HCT VFR BLD CALC: 40.3 % — SIGNIFICANT CHANGE UP (ref 39–50)
HCT VFR BLD CALC: 40.8 % — SIGNIFICANT CHANGE UP (ref 39–50)
HCT VFR BLD CALC: 41.2 % — SIGNIFICANT CHANGE UP (ref 39–50)
HDLC SERPL-MCNC: 34 MG/DL — LOW
HGB BLD-MCNC: 12.5 G/DL — LOW (ref 13–17)
HGB BLD-MCNC: 12.8 G/DL — LOW (ref 13–17)
HGB BLD-MCNC: 13.6 G/DL — SIGNIFICANT CHANGE UP (ref 13–17)
HGB BLD-MCNC: 13.8 G/DL — SIGNIFICANT CHANGE UP (ref 13–17)
HGB BLD-MCNC: 14 G/DL — SIGNIFICANT CHANGE UP (ref 13–17)
IMM GRANULOCYTES NFR BLD AUTO: 0.4 % — SIGNIFICANT CHANGE UP (ref 0–0.9)
IMM GRANULOCYTES NFR BLD AUTO: 0.4 % — SIGNIFICANT CHANGE UP (ref 0–0.9)
INR BLD: 1.15 RATIO — SIGNIFICANT CHANGE UP (ref 0.88–1.16)
INR BLD: 1.17 RATIO — HIGH (ref 0.88–1.16)
INR BLD: 1.43 RATIO — HIGH (ref 0.88–1.16)
INR BLD: 1.53 RATIO — HIGH (ref 0.88–1.16)
INR BLD: 1.84 RATIO — HIGH (ref 0.88–1.16)
INR BLD: 2.1 RATIO — HIGH (ref 0.88–1.16)
INR BLD: 2.28 RATIO — HIGH (ref 0.88–1.16)
INR BLD: 3.29 RATIO — HIGH (ref 0.88–1.16)
INR BLD: 3.77 RATIO — HIGH (ref 0.88–1.16)
KETONES UR-MCNC: NEGATIVE — SIGNIFICANT CHANGE UP
LACTATE SERPL-SCNC: 1.9 MMOL/L — SIGNIFICANT CHANGE UP (ref 0.7–2)
LEUKOCYTE ESTERASE UR-ACNC: NEGATIVE — SIGNIFICANT CHANGE UP
LIDOCAIN IGE QN: 66 U/L — LOW (ref 73–393)
LIPID PNL WITH DIRECT LDL SERPL: 32 MG/DL — SIGNIFICANT CHANGE UP
LYMPHOCYTES # BLD AUTO: 1.13 K/UL — SIGNIFICANT CHANGE UP (ref 1–3.3)
LYMPHOCYTES # BLD AUTO: 1.22 K/UL — SIGNIFICANT CHANGE UP (ref 1–3.3)
LYMPHOCYTES # BLD AUTO: 14.5 % — SIGNIFICANT CHANGE UP (ref 13–44)
LYMPHOCYTES # BLD AUTO: 14.7 % — SIGNIFICANT CHANGE UP (ref 13–44)
MCHC RBC-ENTMCNC: 32.9 PG — SIGNIFICANT CHANGE UP (ref 27–34)
MCHC RBC-ENTMCNC: 33 PG — SIGNIFICANT CHANGE UP (ref 27–34)
MCHC RBC-ENTMCNC: 33.2 PG — SIGNIFICANT CHANGE UP (ref 27–34)
MCHC RBC-ENTMCNC: 33.3 PG — SIGNIFICANT CHANGE UP (ref 27–34)
MCHC RBC-ENTMCNC: 33.3 PG — SIGNIFICANT CHANGE UP (ref 27–34)
MCHC RBC-ENTMCNC: 33.6 GM/DL — SIGNIFICANT CHANGE UP (ref 32–36)
MCHC RBC-ENTMCNC: 33.7 GM/DL — SIGNIFICANT CHANGE UP (ref 32–36)
MCHC RBC-ENTMCNC: 33.8 GM/DL — SIGNIFICANT CHANGE UP (ref 32–36)
MCHC RBC-ENTMCNC: 34 GM/DL — SIGNIFICANT CHANGE UP (ref 32–36)
MCHC RBC-ENTMCNC: 34 GM/DL — SIGNIFICANT CHANGE UP (ref 32–36)
MCV RBC AUTO: 97.2 FL — SIGNIFICANT CHANGE UP (ref 80–100)
MCV RBC AUTO: 97.9 FL — SIGNIFICANT CHANGE UP (ref 80–100)
MCV RBC AUTO: 97.9 FL — SIGNIFICANT CHANGE UP (ref 80–100)
MCV RBC AUTO: 98.3 FL — SIGNIFICANT CHANGE UP (ref 80–100)
MCV RBC AUTO: 98.5 FL — SIGNIFICANT CHANGE UP (ref 80–100)
MONOCYTES # BLD AUTO: 0.72 K/UL — SIGNIFICANT CHANGE UP (ref 0–0.9)
MONOCYTES # BLD AUTO: 0.76 K/UL — SIGNIFICANT CHANGE UP (ref 0–0.9)
MONOCYTES NFR BLD AUTO: 8.7 % — SIGNIFICANT CHANGE UP (ref 2–14)
MONOCYTES NFR BLD AUTO: 9.8 % — SIGNIFICANT CHANGE UP (ref 2–14)
NEUTROPHILS # BLD AUTO: 5.75 K/UL — SIGNIFICANT CHANGE UP (ref 1.8–7.4)
NEUTROPHILS # BLD AUTO: 6.17 K/UL — SIGNIFICANT CHANGE UP (ref 1.8–7.4)
NEUTROPHILS NFR BLD AUTO: 73.7 % — SIGNIFICANT CHANGE UP (ref 43–77)
NEUTROPHILS NFR BLD AUTO: 74.2 % — SIGNIFICANT CHANGE UP (ref 43–77)
NITRITE UR-MCNC: NEGATIVE — SIGNIFICANT CHANGE UP
NON HDL CHOLESTEROL: 49 MG/DL — SIGNIFICANT CHANGE UP
PH UR: 8 — SIGNIFICANT CHANGE UP (ref 5–8)
PLATELET # BLD AUTO: 125 K/UL — LOW (ref 150–400)
PLATELET # BLD AUTO: 133 K/UL — LOW (ref 150–400)
PLATELET # BLD AUTO: 141 K/UL — LOW (ref 150–400)
PLATELET # BLD AUTO: 143 K/UL — LOW (ref 150–400)
PLATELET # BLD AUTO: 147 K/UL — LOW (ref 150–400)
POTASSIUM SERPL-MCNC: 4 MMOL/L — SIGNIFICANT CHANGE UP (ref 3.5–5.3)
POTASSIUM SERPL-MCNC: 4.1 MMOL/L — SIGNIFICANT CHANGE UP (ref 3.5–5.3)
POTASSIUM SERPL-MCNC: 4.2 MMOL/L — SIGNIFICANT CHANGE UP (ref 3.5–5.3)
POTASSIUM SERPL-MCNC: 4.4 MMOL/L — SIGNIFICANT CHANGE UP (ref 3.5–5.3)
POTASSIUM SERPL-MCNC: 4.6 MMOL/L — SIGNIFICANT CHANGE UP (ref 3.5–5.3)
POTASSIUM SERPL-SCNC: 4 MMOL/L — SIGNIFICANT CHANGE UP (ref 3.5–5.3)
POTASSIUM SERPL-SCNC: 4.1 MMOL/L — SIGNIFICANT CHANGE UP (ref 3.5–5.3)
POTASSIUM SERPL-SCNC: 4.2 MMOL/L — SIGNIFICANT CHANGE UP (ref 3.5–5.3)
POTASSIUM SERPL-SCNC: 4.4 MMOL/L — SIGNIFICANT CHANGE UP (ref 3.5–5.3)
POTASSIUM SERPL-SCNC: 4.6 MMOL/L — SIGNIFICANT CHANGE UP (ref 3.5–5.3)
PROT SERPL-MCNC: 6.3 GM/DL — SIGNIFICANT CHANGE UP (ref 6–8.3)
PROT SERPL-MCNC: 6.4 GM/DL — SIGNIFICANT CHANGE UP (ref 6–8.3)
PROT UR-MCNC: 15
PROTHROM AB SERPL-ACNC: 13.4 SEC — SIGNIFICANT CHANGE UP (ref 10.5–13.4)
PROTHROM AB SERPL-ACNC: 13.6 SEC — HIGH (ref 10.5–13.4)
PROTHROM AB SERPL-ACNC: 16.7 SEC — HIGH (ref 10.5–13.4)
PROTHROM AB SERPL-ACNC: 17.8 SEC — HIGH (ref 10.5–13.4)
PROTHROM AB SERPL-ACNC: 21.5 SEC — HIGH (ref 10.5–13.4)
PROTHROM AB SERPL-ACNC: 24.6 SEC — HIGH (ref 10.5–13.4)
PROTHROM AB SERPL-ACNC: 26.7 SEC — HIGH (ref 10.5–13.4)
PROTHROM AB SERPL-ACNC: 38.6 SEC — HIGH (ref 10.5–13.4)
PROTHROM AB SERPL-ACNC: 44.3 SEC — HIGH (ref 10.5–13.4)
RAPID RVP RESULT: SIGNIFICANT CHANGE UP
RBC # BLD: 3.8 M/UL — LOW (ref 4.2–5.8)
RBC # BLD: 3.85 M/UL — LOW (ref 4.2–5.8)
RBC # BLD: 4.09 M/UL — LOW (ref 4.2–5.8)
RBC # BLD: 4.15 M/UL — LOW (ref 4.2–5.8)
RBC # BLD: 4.24 M/UL — SIGNIFICANT CHANGE UP (ref 4.2–5.8)
RBC # FLD: 14.7 % — HIGH (ref 10.3–14.5)
RBC # FLD: 14.8 % — HIGH (ref 10.3–14.5)
RBC # FLD: 14.8 % — HIGH (ref 10.3–14.5)
RBC # FLD: 14.9 % — HIGH (ref 10.3–14.5)
RBC # FLD: 15.1 % — HIGH (ref 10.3–14.5)
SARS-COV-2 RNA SPEC QL NAA+PROBE: SIGNIFICANT CHANGE UP
SODIUM SERPL-SCNC: 135 MMOL/L — SIGNIFICANT CHANGE UP (ref 135–145)
SODIUM SERPL-SCNC: 137 MMOL/L — SIGNIFICANT CHANGE UP (ref 135–145)
SODIUM SERPL-SCNC: 137 MMOL/L — SIGNIFICANT CHANGE UP (ref 135–145)
SODIUM SERPL-SCNC: 138 MMOL/L — SIGNIFICANT CHANGE UP (ref 135–145)
SODIUM SERPL-SCNC: 139 MMOL/L — SIGNIFICANT CHANGE UP (ref 135–145)
SP GR SPEC: 1.01 — SIGNIFICANT CHANGE UP (ref 1.01–1.02)
TRIGL SERPL-MCNC: 85 MG/DL — SIGNIFICANT CHANGE UP
UROBILINOGEN FLD QL: NEGATIVE — SIGNIFICANT CHANGE UP
WBC # BLD: 6.29 K/UL — SIGNIFICANT CHANGE UP (ref 3.8–10.5)
WBC # BLD: 7.51 K/UL — SIGNIFICANT CHANGE UP (ref 3.8–10.5)
WBC # BLD: 7.79 K/UL — SIGNIFICANT CHANGE UP (ref 3.8–10.5)
WBC # BLD: 7.81 K/UL — SIGNIFICANT CHANGE UP (ref 3.8–10.5)
WBC # BLD: 8.3 K/UL — SIGNIFICANT CHANGE UP (ref 3.8–10.5)
WBC # FLD AUTO: 6.29 K/UL — SIGNIFICANT CHANGE UP (ref 3.8–10.5)
WBC # FLD AUTO: 7.51 K/UL — SIGNIFICANT CHANGE UP (ref 3.8–10.5)
WBC # FLD AUTO: 7.79 K/UL — SIGNIFICANT CHANGE UP (ref 3.8–10.5)
WBC # FLD AUTO: 7.81 K/UL — SIGNIFICANT CHANGE UP (ref 3.8–10.5)
WBC # FLD AUTO: 8.3 K/UL — SIGNIFICANT CHANGE UP (ref 3.8–10.5)

## 2022-01-01 PROCEDURE — 36415 COLL VENOUS BLD VENIPUNCTURE: CPT

## 2022-01-01 PROCEDURE — 72125 CT NECK SPINE W/O DYE: CPT | Mod: 26,MA

## 2022-01-01 PROCEDURE — 80048 BASIC METABOLIC PNL TOTAL CA: CPT

## 2022-01-01 PROCEDURE — 84484 ASSAY OF TROPONIN QUANT: CPT

## 2022-01-01 PROCEDURE — 70450 CT HEAD/BRAIN W/O DYE: CPT | Mod: 26,MA

## 2022-01-01 PROCEDURE — 99285 EMERGENCY DEPT VISIT HI MDM: CPT

## 2022-01-01 PROCEDURE — 99233 SBSQ HOSP IP/OBS HIGH 50: CPT

## 2022-01-01 PROCEDURE — 93306 TTE W/DOPPLER COMPLETE: CPT | Mod: 26

## 2022-01-01 PROCEDURE — 93010 ELECTROCARDIOGRAM REPORT: CPT

## 2022-01-01 PROCEDURE — 95816 EEG AWAKE AND DROWSY: CPT | Mod: 26

## 2022-01-01 PROCEDURE — 73130 X-RAY EXAM OF HAND: CPT | Mod: 26,RT

## 2022-01-01 PROCEDURE — 85025 COMPLETE CBC W/AUTO DIFF WBC: CPT

## 2022-01-01 PROCEDURE — 97163 PT EVAL HIGH COMPLEX 45 MIN: CPT | Mod: GP

## 2022-01-01 PROCEDURE — 80061 LIPID PANEL: CPT

## 2022-01-01 PROCEDURE — 85730 THROMBOPLASTIN TIME PARTIAL: CPT

## 2022-01-01 PROCEDURE — 99223 1ST HOSP IP/OBS HIGH 75: CPT

## 2022-01-01 PROCEDURE — 83036 HEMOGLOBIN GLYCOSYLATED A1C: CPT

## 2022-01-01 PROCEDURE — 0225U NFCT DS DNA&RNA 21 SARSCOV2: CPT

## 2022-01-01 PROCEDURE — 82962 GLUCOSE BLOOD TEST: CPT

## 2022-01-01 PROCEDURE — 85610 PROTHROMBIN TIME: CPT

## 2022-01-01 PROCEDURE — 93306 TTE W/DOPPLER COMPLETE: CPT

## 2022-01-01 PROCEDURE — 72170 X-RAY EXAM OF PELVIS: CPT | Mod: 26

## 2022-01-01 PROCEDURE — 99232 SBSQ HOSP IP/OBS MODERATE 35: CPT

## 2022-01-01 PROCEDURE — 95816 EEG AWAKE AND DROWSY: CPT

## 2022-01-01 PROCEDURE — 97164 PT RE-EVAL EST PLAN CARE: CPT | Mod: GP

## 2022-01-01 PROCEDURE — 99239 HOSP IP/OBS DSCHRG MGMT >30: CPT

## 2022-01-01 PROCEDURE — 81001 URINALYSIS AUTO W/SCOPE: CPT

## 2022-01-01 PROCEDURE — 99255 IP/OBS CONSLTJ NEW/EST HI 80: CPT

## 2022-01-01 PROCEDURE — 71045 X-RAY EXAM CHEST 1 VIEW: CPT | Mod: 26

## 2022-01-01 PROCEDURE — 74176 CT ABD & PELVIS W/O CONTRAST: CPT | Mod: 26,MA

## 2022-01-01 PROCEDURE — 90792 PSYCH DIAG EVAL W/MED SRVCS: CPT

## 2022-01-01 PROCEDURE — 80053 COMPREHEN METABOLIC PANEL: CPT

## 2022-01-01 PROCEDURE — 71250 CT THORAX DX C-: CPT | Mod: 26,MA

## 2022-01-01 PROCEDURE — 97116 GAIT TRAINING THERAPY: CPT | Mod: GP

## 2022-01-01 PROCEDURE — 85027 COMPLETE CBC AUTOMATED: CPT

## 2022-01-01 RX ORDER — DEXTROSE 50 % IN WATER 50 %
25 SYRINGE (ML) INTRAVENOUS ONCE
Refills: 0 | Status: DISCONTINUED | OUTPATIENT
Start: 2022-01-01 | End: 2022-01-01

## 2022-01-01 RX ORDER — INSULIN LISPRO 100/ML
VIAL (ML) SUBCUTANEOUS AT BEDTIME
Refills: 0 | Status: DISCONTINUED | OUTPATIENT
Start: 2022-01-01 | End: 2022-01-01

## 2022-01-01 RX ORDER — ASPIRIN/CALCIUM CARB/MAGNESIUM 324 MG
81 TABLET ORAL DAILY
Refills: 0 | Status: DISCONTINUED | OUTPATIENT
Start: 2022-01-01 | End: 2022-01-01

## 2022-01-01 RX ORDER — NICOTINE POLACRILEX 2 MG
1 GUM BUCCAL DAILY
Refills: 0 | Status: DISCONTINUED | OUTPATIENT
Start: 2022-01-01 | End: 2022-01-01

## 2022-01-01 RX ORDER — LANOLIN ALCOHOL/MO/W.PET/CERES
3 CREAM (GRAM) TOPICAL ONCE
Refills: 0 | Status: DISCONTINUED | OUTPATIENT
Start: 2022-01-01 | End: 2022-01-01

## 2022-01-01 RX ORDER — SODIUM CHLORIDE 9 MG/ML
1000 INJECTION, SOLUTION INTRAVENOUS
Refills: 0 | Status: DISCONTINUED | OUTPATIENT
Start: 2022-01-01 | End: 2022-01-01

## 2022-01-01 RX ORDER — ALBUTEROL 90 UG/1
0 AEROSOL, METERED ORAL
Qty: 0 | Refills: 0 | DISCHARGE

## 2022-01-01 RX ORDER — HALOPERIDOL DECANOATE 100 MG/ML
5 INJECTION INTRAMUSCULAR ONCE
Refills: 0 | Status: COMPLETED | OUTPATIENT
Start: 2022-01-01 | End: 2022-01-01

## 2022-01-01 RX ORDER — WARFARIN SODIUM 2.5 MG/1
1 TABLET ORAL
Qty: 0 | Refills: 0 | DISCHARGE
Start: 2022-01-01

## 2022-01-01 RX ORDER — SODIUM CHLORIDE 9 MG/ML
250 INJECTION INTRAMUSCULAR; INTRAVENOUS; SUBCUTANEOUS ONCE
Refills: 0 | Status: COMPLETED | OUTPATIENT
Start: 2022-01-01 | End: 2022-01-01

## 2022-01-01 RX ORDER — METFORMIN HYDROCHLORIDE 850 MG/1
1 TABLET ORAL
Qty: 0 | Refills: 0 | DISCHARGE

## 2022-01-01 RX ORDER — DEXTROSE 50 % IN WATER 50 %
15 SYRINGE (ML) INTRAVENOUS ONCE
Refills: 0 | Status: DISCONTINUED | OUTPATIENT
Start: 2022-01-01 | End: 2022-01-01

## 2022-01-01 RX ORDER — WARFARIN SODIUM 2.5 MG/1
0 TABLET ORAL
Qty: 0 | Refills: 0 | DISCHARGE

## 2022-01-01 RX ORDER — WARFARIN SODIUM 2.5 MG/1
7.5 TABLET ORAL ONCE
Refills: 0 | Status: COMPLETED | OUTPATIENT
Start: 2022-01-01 | End: 2022-01-01

## 2022-01-01 RX ORDER — ATORVASTATIN CALCIUM 80 MG/1
80 TABLET, FILM COATED ORAL AT BEDTIME
Refills: 0 | Status: DISCONTINUED | OUTPATIENT
Start: 2022-01-01 | End: 2022-01-01

## 2022-01-01 RX ORDER — CARVEDILOL PHOSPHATE 80 MG/1
3.12 CAPSULE, EXTENDED RELEASE ORAL EVERY 12 HOURS
Refills: 0 | Status: DISCONTINUED | OUTPATIENT
Start: 2022-01-01 | End: 2022-01-01

## 2022-01-01 RX ORDER — OLANZAPINE 15 MG/1
2.5 TABLET, FILM COATED ORAL ONCE
Refills: 0 | Status: DISCONTINUED | OUTPATIENT
Start: 2022-01-01 | End: 2022-01-01

## 2022-01-01 RX ORDER — WARFARIN SODIUM 2.5 MG/1
5 TABLET ORAL DAILY
Refills: 0 | Status: COMPLETED | OUTPATIENT
Start: 2022-01-01 | End: 2022-01-01

## 2022-01-01 RX ORDER — WARFARIN SODIUM 2.5 MG/1
4 TABLET ORAL DAILY
Refills: 0 | Status: DISCONTINUED | OUTPATIENT
Start: 2022-01-01 | End: 2022-01-01

## 2022-01-01 RX ORDER — ALBUTEROL 90 UG/1
2 AEROSOL, METERED ORAL EVERY 6 HOURS
Refills: 0 | Status: DISCONTINUED | OUTPATIENT
Start: 2022-01-01 | End: 2022-01-01

## 2022-01-01 RX ORDER — CEFTRIAXONE 500 MG/1
1000 INJECTION, POWDER, FOR SOLUTION INTRAMUSCULAR; INTRAVENOUS ONCE
Refills: 0 | Status: COMPLETED | OUTPATIENT
Start: 2022-01-01 | End: 2022-01-01

## 2022-01-01 RX ORDER — AZITHROMYCIN 500 MG/1
500 TABLET, FILM COATED ORAL ONCE
Refills: 0 | Status: COMPLETED | OUTPATIENT
Start: 2022-01-01 | End: 2022-01-01

## 2022-01-01 RX ORDER — ACETAMINOPHEN 500 MG
650 TABLET ORAL EVERY 6 HOURS
Refills: 0 | Status: DISCONTINUED | OUTPATIENT
Start: 2022-01-01 | End: 2022-01-01

## 2022-01-01 RX ORDER — INSULIN LISPRO 100/ML
VIAL (ML) SUBCUTANEOUS
Refills: 0 | Status: DISCONTINUED | OUTPATIENT
Start: 2022-01-01 | End: 2022-01-01

## 2022-01-01 RX ORDER — GLUCAGON INJECTION, SOLUTION 0.5 MG/.1ML
1 INJECTION, SOLUTION SUBCUTANEOUS ONCE
Refills: 0 | Status: DISCONTINUED | OUTPATIENT
Start: 2022-01-01 | End: 2022-01-01

## 2022-01-01 RX ORDER — DEXTROSE 50 % IN WATER 50 %
12.5 SYRINGE (ML) INTRAVENOUS ONCE
Refills: 0 | Status: DISCONTINUED | OUTPATIENT
Start: 2022-01-01 | End: 2022-01-01

## 2022-01-01 RX ORDER — ONDANSETRON 8 MG/1
4 TABLET, FILM COATED ORAL EVERY 6 HOURS
Refills: 0 | Status: DISCONTINUED | OUTPATIENT
Start: 2022-01-01 | End: 2022-01-01

## 2022-01-01 RX ORDER — WARFARIN SODIUM 2.5 MG/1
5 TABLET ORAL AT BEDTIME
Refills: 0 | Status: COMPLETED | OUTPATIENT
Start: 2022-01-01 | End: 2022-01-01

## 2022-01-01 RX ORDER — CEFTRIAXONE 500 MG/1
1000 INJECTION, POWDER, FOR SOLUTION INTRAMUSCULAR; INTRAVENOUS ONCE
Refills: 0 | Status: DISCONTINUED | OUTPATIENT
Start: 2022-01-01 | End: 2022-01-01

## 2022-01-01 RX ORDER — WARFARIN SODIUM 2.5 MG/1
5 TABLET ORAL ONCE
Refills: 0 | Status: COMPLETED | OUTPATIENT
Start: 2022-01-01 | End: 2022-01-01

## 2022-01-01 RX ADMIN — ATORVASTATIN CALCIUM 80 MILLIGRAM(S): 80 TABLET, FILM COATED ORAL at 21:58

## 2022-01-01 RX ADMIN — Medication 1 PATCH: at 10:56

## 2022-01-01 RX ADMIN — CARVEDILOL PHOSPHATE 3.12 MILLIGRAM(S): 80 CAPSULE, EXTENDED RELEASE ORAL at 21:35

## 2022-01-01 RX ADMIN — CARVEDILOL PHOSPHATE 3.12 MILLIGRAM(S): 80 CAPSULE, EXTENDED RELEASE ORAL at 09:18

## 2022-01-01 RX ADMIN — CARVEDILOL PHOSPHATE 3.12 MILLIGRAM(S): 80 CAPSULE, EXTENDED RELEASE ORAL at 09:22

## 2022-01-01 RX ADMIN — Medication 81 MILLIGRAM(S): at 09:18

## 2022-01-01 RX ADMIN — CARVEDILOL PHOSPHATE 3.12 MILLIGRAM(S): 80 CAPSULE, EXTENDED RELEASE ORAL at 21:09

## 2022-01-01 RX ADMIN — CARVEDILOL PHOSPHATE 3.12 MILLIGRAM(S): 80 CAPSULE, EXTENDED RELEASE ORAL at 21:14

## 2022-01-01 RX ADMIN — WARFARIN SODIUM 5 MILLIGRAM(S): 2.5 TABLET ORAL at 21:58

## 2022-01-01 RX ADMIN — CARVEDILOL PHOSPHATE 3.12 MILLIGRAM(S): 80 CAPSULE, EXTENDED RELEASE ORAL at 10:38

## 2022-01-01 RX ADMIN — CARVEDILOL PHOSPHATE 3.12 MILLIGRAM(S): 80 CAPSULE, EXTENDED RELEASE ORAL at 21:27

## 2022-01-01 RX ADMIN — ATORVASTATIN CALCIUM 80 MILLIGRAM(S): 80 TABLET, FILM COATED ORAL at 21:50

## 2022-01-01 RX ADMIN — CARVEDILOL PHOSPHATE 3.12 MILLIGRAM(S): 80 CAPSULE, EXTENDED RELEASE ORAL at 10:17

## 2022-01-01 RX ADMIN — CARVEDILOL PHOSPHATE 3.12 MILLIGRAM(S): 80 CAPSULE, EXTENDED RELEASE ORAL at 23:00

## 2022-01-01 RX ADMIN — ATORVASTATIN CALCIUM 80 MILLIGRAM(S): 80 TABLET, FILM COATED ORAL at 21:35

## 2022-01-01 RX ADMIN — Medication 1 PATCH: at 21:15

## 2022-01-01 RX ADMIN — WARFARIN SODIUM 5 MILLIGRAM(S): 2.5 TABLET ORAL at 23:00

## 2022-01-01 RX ADMIN — WARFARIN SODIUM 5 MILLIGRAM(S): 2.5 TABLET ORAL at 21:08

## 2022-01-01 RX ADMIN — Medication 81 MILLIGRAM(S): at 09:59

## 2022-01-01 RX ADMIN — Medication 81 MILLIGRAM(S): at 09:10

## 2022-01-01 RX ADMIN — CARVEDILOL PHOSPHATE 3.12 MILLIGRAM(S): 80 CAPSULE, EXTENDED RELEASE ORAL at 21:34

## 2022-01-01 RX ADMIN — WARFARIN SODIUM 5 MILLIGRAM(S): 2.5 TABLET ORAL at 21:15

## 2022-01-01 RX ADMIN — ATORVASTATIN CALCIUM 80 MILLIGRAM(S): 80 TABLET, FILM COATED ORAL at 21:27

## 2022-01-01 RX ADMIN — Medication 81 MILLIGRAM(S): at 10:39

## 2022-01-01 RX ADMIN — ATORVASTATIN CALCIUM 80 MILLIGRAM(S): 80 TABLET, FILM COATED ORAL at 21:15

## 2022-01-01 RX ADMIN — CARVEDILOL PHOSPHATE 3.12 MILLIGRAM(S): 80 CAPSULE, EXTENDED RELEASE ORAL at 11:57

## 2022-01-01 RX ADMIN — Medication 81 MILLIGRAM(S): at 09:02

## 2022-01-01 RX ADMIN — Medication 81 MILLIGRAM(S): at 11:57

## 2022-01-01 RX ADMIN — Medication 1 PATCH: at 18:22

## 2022-01-01 RX ADMIN — CARVEDILOL PHOSPHATE 3.12 MILLIGRAM(S): 80 CAPSULE, EXTENDED RELEASE ORAL at 18:21

## 2022-01-01 RX ADMIN — Medication 81 MILLIGRAM(S): at 10:16

## 2022-01-01 RX ADMIN — CEFTRIAXONE 1000 MILLIGRAM(S): 500 INJECTION, POWDER, FOR SOLUTION INTRAMUSCULAR; INTRAVENOUS at 22:46

## 2022-01-01 RX ADMIN — Medication 81 MILLIGRAM(S): at 09:22

## 2022-01-01 RX ADMIN — AZITHROMYCIN 255 MILLIGRAM(S): 500 TABLET, FILM COATED ORAL at 22:46

## 2022-01-01 RX ADMIN — CARVEDILOL PHOSPHATE 3.12 MILLIGRAM(S): 80 CAPSULE, EXTENDED RELEASE ORAL at 21:58

## 2022-01-01 RX ADMIN — WARFARIN SODIUM 7.5 MILLIGRAM(S): 2.5 TABLET ORAL at 21:34

## 2022-01-01 RX ADMIN — ATORVASTATIN CALCIUM 80 MILLIGRAM(S): 80 TABLET, FILM COATED ORAL at 21:34

## 2022-01-01 RX ADMIN — Medication 1 PATCH: at 09:13

## 2022-01-01 RX ADMIN — Medication 1: at 17:37

## 2022-01-01 RX ADMIN — Medication 1 PATCH: at 19:42

## 2022-01-01 RX ADMIN — SODIUM CHLORIDE 250 MILLILITER(S): 9 INJECTION INTRAMUSCULAR; INTRAVENOUS; SUBCUTANEOUS at 21:06

## 2022-01-01 RX ADMIN — Medication 81 MILLIGRAM(S): at 18:21

## 2022-01-01 RX ADMIN — CARVEDILOL PHOSPHATE 3.12 MILLIGRAM(S): 80 CAPSULE, EXTENDED RELEASE ORAL at 21:50

## 2022-01-01 RX ADMIN — WARFARIN SODIUM 5 MILLIGRAM(S): 2.5 TABLET ORAL at 21:35

## 2022-01-01 RX ADMIN — CARVEDILOL PHOSPHATE 3.12 MILLIGRAM(S): 80 CAPSULE, EXTENDED RELEASE ORAL at 09:10

## 2022-01-01 RX ADMIN — CARVEDILOL PHOSPHATE 3.12 MILLIGRAM(S): 80 CAPSULE, EXTENDED RELEASE ORAL at 09:59

## 2022-01-01 RX ADMIN — ATORVASTATIN CALCIUM 80 MILLIGRAM(S): 80 TABLET, FILM COATED ORAL at 23:00

## 2022-04-05 NOTE — H&P ADULT - HISTORY OF PRESENT ILLNESS
April 5, 2022     Patient: Lexiill Fitting   YOB: 2012   Date of Visit: 4/5/2022       To Whom it May Concern:    Lexiill Fitting is under my professional care  Please excuse him from school 4/4/22 and 4/5/22  He may return on 4/6/22  If you have any questions or concerns, please don't hesitate to call           Sincerely,          Denton Johnson RN      CC: No Recipients Patient Stalin Ronquillo is a 77 year old male with significant past medical history of atrial fibrillation on Amiodarone and Coumadin, CHF on Coreg and Lisinopril, COPD on Spiriva, DM II on Metformin, HTN, [employer] PPM, CVA, and PCI,  known to cardiac surgery service as a preoperative patient for CABG, was brought in by EMS to [facility] 12/8/17 with complaints of gradual onset of shortness of breath and waxing and waning 8/10 chest tightness. In ER, patient was treated with sublingual NTG and BIPAP, found to have elevated BNP, negative cardiac enzymes, and supra therapeutic INR (3.97). Patient was transferred to [facility] in preparation of CABG and preoperative medical optimization. On admission, he is asymptomatic with stable vital signs.

## 2022-11-22 NOTE — ED PROVIDER NOTE - PHYSICAL EXAMINATION
Constitutional: NAD AAOx3  Eyes: PERRL, EOMI  Head: Normocephalic atraumatic  Mouth: MMM  Cardiac: regular rate   Resp: Lungs CTAB  GI: Abd s/nt/nd  Neuro: CN2-12 intact  Extremities: Intact distal pulses b/l, no calf tenderness, normal ROM b/l UE and LE   Skin: + Abrasion to left hand.

## 2022-11-22 NOTE — ED PROVIDER NOTE - NSICDXPASTMEDICALHX_GEN_ALL_CORE_FT
PAST MEDICAL HISTORY:  Asthma     Atrial fibrillation     Bronchitis     Cardiac arrhythmia     CHF (congestive heart failure)     COPD (chronic obstructive pulmonary disease)     Diabetes     Hyperlipidemia     Hypertension     MI, old     Pacemaker     Stented coronary artery     Stroke
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
## 2022-11-22 NOTE — ED PROVIDER NOTE - CLINICAL SUMMARY MEDICAL DECISION MAKING FREE TEXT BOX
Patient's EKG is normal sinus rhythm without acute ST segment changes, labs and imaging were obtained here, patient's chest CT does show a left-sided pleural complex effusion, no other acute traumatic injury, I discussed results with the patient and his family, he has had a cough, he was covered with antibiotics and I discussed with medicine.
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
## 2022-11-22 NOTE — ED ADULT NURSE REASSESSMENT NOTE - NS ED NURSE REASSESS COMMENT FT1
Daughter Manjula Hogue, works at [employer] [phone]. Spoke with Manjula as per patient request. She does not drive but will come as soon as she can get here. She says that her father lives with her and that his wife  two years ago and he moved in with her a year ago because he was not caring for himself. Manjula states that patient is very difficult to deal with and extremely stubborn. She is calling her brother Tayo to come to the ED as he drives.
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
## 2022-11-22 NOTE — ED PROVIDER NOTE - NS ED ROS FT
Constitutional: No fever or chills  Eyes: No visual changes  HEENT: No throat pain  CV: No chest pain  Resp: No SOB no cough  GI: No abd pain, nausea or vomiting  : No dysuria  MSK: No musculoskeletal pain  Skin: +Abrasion.   Neuro: No headache

## 2022-11-23 PROBLEM — I10 ESSENTIAL (PRIMARY) HYPERTENSION: Chronic | Status: ACTIVE | Noted: 2017-12-06

## 2022-11-23 PROBLEM — I63.9 CEREBRAL INFARCTION, UNSPECIFIED: Chronic | Status: ACTIVE | Noted: 2017-10-12

## 2022-11-23 NOTE — H&P ADULT - ASSESSMENT
81 y/o M w/ PMH of DM2, HTN, CAD s/p PCI, dyslipidemia, CHF, COPD, CVA, a-fib, PPM, p/w questionable syncope    *Questionable Syncope  -Tele monitoring  -CTH - chronic lacunar infarcts (has a previous h/o CVA) -> ASA / Statin   -Echo  -EKG - NSR 71bpm, non-specific ST / T-wave abnormality   -Troponins  -Cardio consult  -Neuro consult     *L sided loculated pleural effusion w/ pleural thickening and possible fibrosis noted on CT   -F/u pulm     *Cholelithiasis /  enlarged prostate - incidentally noted on CT  -F/u outpatient     *DM2  -Humalog ISS  -Diabetic diet     *H/o A-fib  -Presently NSR  -On coumadin? amiodarone? beta blocker? as per previous discharge in 2017. [facility]     *H/o CHF / COPD / CVA / PPM placement  -C/w home meds and f/u outpatient for further management if conditions remain stable during hospitalization     *Med reconciliation  -[facility]. However, unable to confirm as patient doesn't know his medications and no med history on [facility]. Will need [facility] in [facility] when [facility] opens to get med list.     *DVT ppx   -SCDs  83 y/o M w/ PMH of DM2, HTN, CAD s/p PCI, dyslipidemia, CHF, COPD, CVA, a-fib, PPM, p/w questionable syncope    *Questionable Syncope  -Tele monitoring  -CTH - chronic lacunar infarcts (has a previous h/o CVA) -> ASA / Statin   -Echo  -EKG - NSR 71bpm, non-specific ST / T-wave abnormality   -Check orthostatics   -Troponins   -Cardio consult  -Neuro consult     *L sided loculated pleural effusion w/ pleural thickening and possible fibrosis noted on CT   -F/u pulm     *Cholelithiasis /  enlarged prostate - incidentally noted on CT  -F/u outpatient     *DM2  -Humalog ISS  -Diabetic diet     *H/o A-fib  -Presently NSR  -On coumadin? amiodarone? beta blocker? as per previous discharge in 2017. [facility]     *H/o CHF / COPD / CVA / PPM placement  -C/w home meds and f/u outpatient for further management if conditions remain stable during hospitalization     *Med reconcilliation  -Patient [facility]. However, unable to confirm as patient doesn't know his medications and no med history on [facility]. Will need [facility] in AM when [facility] opens to get med list.     *DVT ppx   -SCDs  81 y/o M w/ PMH of DM2, HTN, CAD s/p PCI, dyslipidemia, CHF, COPD, CVA, a-fib, PPM, p/w questionable syncope    *Questionable Syncope  -Tele monitoring  -CTH - chronic lacunar infarcts (has a previous h/o CVA) -> ASA / Statin   -Echo  -EKG - NSR 71bpm, non-specific ST / T-wave abnormality   -Check orthostatics   -Troponins   -Cardio consult  -Neuro consult     *L sided loculated pleural effusion w/ pleural thickening and possible fibrosis noted on CT   -F/u pulm     *Cholelithiasis /  enlarged prostate - incidentally noted on CT  -F/u outpatient     *DM2  -Humalog ISS  -Diabetic diet     *H/o A-fib  -Presently NSR  -On coumadin? amiodarone? beta blocker? as per previous discharge in 2017. Will need [facility]     *H/o CHF / COPD / CVA / PPM placement  -C/w home meds and f/u outpatient for further management if conditions remain stable during hospitalization     *Med reconciliation  -Patient states that [facility]. However, unable to confirm as patient doesn't know his medications and no med history on [facility]. Will need [facility] in [facility] when [facility] opens to get med list.     *DVT ppx   -SCDs
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
## 2022-11-23 NOTE — PATIENT PROFILE ADULT - FALL HARM RISK - UNIVERSAL INTERVENTIONS
Bed in lowest position, wheels locked, appropriate side rails in place/Call bell, personal items and telephone in reach/Instruct patient to call for assistance before getting out of bed or chair/Non-slip footwear when patient is out of bed/[place] to call system/Physically safe environment - no spills, clutter or unnecessary equipment/Purposeful Proactive Rounding/Room/bathroom lighting operational, light cord in reach

## 2022-11-23 NOTE — CONSULT NOTE ADULT - PROBLEM SELECTOR RECOMMENDATION 9
Apparently PAF now in NSR. Pt unaware of anticoagulation or other meds related to atrial fibrillation. Pt has been in NSR since admission.

## 2022-11-23 NOTE — PATIENT PROFILE ADULT - HAS THE PATIENT RECEIVED THE INFLUENZA VACCINE THIS SEASON?
unable to assess immunization status... Bcc Histology Text: There were numerous aggregates of basaloid cells.

## 2022-11-23 NOTE — PROVIDER CONTACT NOTE (OTHER) - SITUATION
Patient received from TCU. Patient A+OX4. Patient still in clothes from home. Clothes dirty & smell of urine. Patient attempting to leaving hospital states "he's going home". Situation de-escalated by

## 2022-11-23 NOTE — CONSULT NOTE ADULT - PROBLEM SELECTOR RECOMMENDATION 2
Unclear etiology. [facility] but is unaware of details. Testing was reviewed. Pt is very hard of hearing.

## 2022-11-23 NOTE — PROVIDER CONTACT NOTE (OTHER) - BACKGROUND
talking to patient. Patient now sitting in chair in front of nursing station. Daughter called. Daughter will come to hospital at 1330. Patient refusing vitals, remote telly, and all medical care.

## 2022-11-23 NOTE — PROGRESS NOTE ADULT - ASSESSMENT
81 y/o M w/ PMH of DM2, HTN, CAD s/p PCI, dyslipidemia, CHF, COPD, CVA, a-fib, PPM, p/w questionable syncope    *Questionable Syncope  -Tele monitoring  -CTH - chronic lacunar infarcts (has a previous h/o CVA) -> ASA / Statin   -Echo  -EKG - NSR 71bpm, non-specific ST / T-wave abnormality   -Cardio consult  -Neuro consult     *L sided loculated pleural effusion w/ pleural thickening and possible fibrosis noted on CT   -F/u pulm     *Cholelithiasis /  enlarged prostate - incidentally noted on CT  -F/u outpatient     *DM2  -Humalog ISS  -Diabetic diet     *H/o A-fib  -Presently NSR  -On coumadin? amiodarone? beta blocker? as per previous discharge in 2017. [facility]     *H/o CHF / COPD / CVA / PPM placement  -C/w home meds and f/u outpatient for further management if conditions remain stable during hospitalization     *Med reconciliation  -[facility]. will call [facility]  *DVT ppx  -SCDs
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
## 2022-11-24 NOTE — BH CONSULTATION LIAISON ASSESSMENT NOTE - NSBHCHARTREVIEWINVESTIGATE_PSY_A_CORE FT
Ventricular Rate 71 BPM    Atrial Rate 71 BPM    P-R Interval 154 ms    QRS Duration 86 ms    Q-T Interval 400 ms    QTC Calculation(Bazett) 434 ms    P Axis 67 degrees    R Axis -11 degrees    T Axis 90 degrees    Diagnosis Line Poor data quality  Normal sinus rhythm  Nonspecific ST and T wave abnormality  Poor R wave progression V1-V3 / cannot exclude septal infarct  Abnormal ECG

## 2022-11-24 NOTE — BH CONSULTATION LIAISON ASSESSMENT NOTE - NSBHCHARTREVIEWVS_PSY_A_CORE FT
Vital Signs Last 24 Hrs  T(C): 36.9 (23 Nov 2022 21:33), Max: 36.9 (23 Nov 2022 15:05)  T(F): 98.4 (23 Nov 2022 21:33), Max: 98.4 (23 Nov 2022 15:05)  HR: 70 (24 Nov 2022 09:23) (70 - 79)  BP: 144/69 (24 Nov 2022 09:23) (127/62 - 153/84)  BP(mean): --  RR: 19 (24 Nov 2022 09:23) (18 - 19)  SpO2: 97% (24 Nov 2022 09:23) (96% - 100%)    Parameters below as of 24 Nov 2022 09:23  Patient On (Oxygen Delivery Method): room air

## 2022-11-24 NOTE — BH CONSULTATION LIAISON ASSESSMENT NOTE - CURRENT MEDICATION
MEDICATIONS  (STANDING):  aspirin  chewable 81 milliGRAM(s) Oral daily  atorvastatin 80 milliGRAM(s) Oral at bedtime  carvedilol 3.125 milliGRAM(s) Oral every 12 hours  dextrose 5%. 1000 milliLiter(s) (50 mL/Hr) IV Continuous <Continuous>  dextrose 5%. 1000 milliLiter(s) (100 mL/Hr) IV Continuous <Continuous>  dextrose 50% Injectable 25 Gram(s) IV Push once  dextrose 50% Injectable 12.5 Gram(s) IV Push once  dextrose 50% Injectable 25 Gram(s) IV Push once  glucagon  Injectable 1 milliGRAM(s) IntraMuscular once  insulin lispro (ADMELOG) corrective regimen sliding scale   SubCutaneous three times a day before meals  insulin lispro (ADMELOG) corrective regimen sliding scale   SubCutaneous at bedtime  OLANZapine Injectable 2.5 milliGRAM(s) IntraMuscular once  warfarin 5 milliGRAM(s) Oral daily    MEDICATIONS  (PRN):  acetaminophen     Tablet .. 650 milliGRAM(s) Oral every 6 hours PRN Mild Pain (1 - 3)  albuterol    90 MICROgram(s) HFA Inhaler 2 Puff(s) Inhalation every 6 hours PRN Bronchospasm  dextrose Oral Gel 15 Gram(s) Oral once PRN Blood Glucose LESS THAN 70 milliGRAM(s)/deciliter  ondansetron Injectable 4 milliGRAM(s) IV Push every 6 hours PRN Nausea and/or Vomiting
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
## 2022-11-24 NOTE — BH CONSULTATION LIAISON ASSESSMENT NOTE - NSBHCHARTREVIEWLAB_PSY_A_CORE FT
14.0   8.30  )-----------( 141      ( 23 Nov 2022 08:07 )             41.2   11-23    139  |  107  |  15  ----------------------------<  87  4.1   |  28  |  0.79    Ca    8.7      23 Nov 2022 08:07    TPro  6.4  /  Alb  2.7<L>  /  TBili  1.0  /  DBili  x   /  AST  16  /  ALT  15  /  AlkPhos  95  11-23
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
## 2022-11-24 NOTE — BH CONSULTATION LIAISON ASSESSMENT NOTE - HPI (INCLUDE ILLNESS QUALITY, SEVERITY, DURATION, TIMING, CONTEXT, MODIFYING FACTORS, ASSOCIATED SIGNS AND SYMPTOMS)
Pt is an  82 YOWM , [place] , denies hx fo PTSD, denies psych hx, per RN waxes and wanes, has times of confusion and next day he is pleasant and coherent, apologetic, EEG is WNL, no aggression,  no agitation. PMH of DM2, HTN, CAD s/p PCI, dyslipidemia, CHF, COPD, CVA, a-fib, PPM, p/w questionable syncope  Pt denies depression dn anxiety, denies SI, HI, ah, VH, PI. He is oriented in time, space to person and situation.   PT states that treatment [facility]man was not discussed, he would like  to go home, and live his life, he does not want to be disturbed.   However, unaware of th and d/c plans.   Pt is [facility].

## 2022-11-24 NOTE — BH CONSULTATION LIAISON ASSESSMENT NOTE - RISK ASSESSMENT
Low acute risk: no psych symptoms, no SI, has acute  med conditions  Low long ter risk;  NO psych hx. no hx fo SA  PF: protective family.

## 2022-11-24 NOTE — BH CONSULTATION LIAISON ASSESSMENT NOTE - SUMMARY
Pt is an  82 YOWM , [place] , denies hx fo PTSD, denies psych hx, per RN waxes and wanes, has times of confusion and next day he is pleasant and coherent, apologetic, EEG is WNL, no aggression,  no agitation. PMH of DM2, HTN, CAD s/p PCI, dyslipidemia, CHF, COPD, CVA, a-fib, PPM, p/w questionable syncope  Pt denies depression dn anxiety, denies SI, HI, ah, VH, PI. He is oriented in time, space to person and situation.   PT states that treatment [facility]man was not discussed, he would like  to go home, and live his life, he does not want to be disturbed.   However, unaware of th and d/c plans.   Pt is [place].     It is not clear what specific capacity pt needs. Per RN she is not sure, but she assumes it is to go to rehab.   PT states nobody [id number] with him treatment dn d/c as well as the rehab, options, alternatives  as well as what would  happen if he doesn't do it.     Pleas provide pt with necessary info about th plan  specifics prior to decisional capacity assessment in order to  see if pt understands, appreciates, reason logically about specific task in  question.     Also per note his driving abilities are questioned. I would suggest to advise pt not to drive until completing confidential driving skills test available on internet, e.g on [employer] website  , or alternatively pt can chose test in local agencies.

## 2022-11-25 NOTE — PROGRESS NOTE ADULT - ASSESSMENT
83 y/o M w/ PMH of DM2, HTN, CAD s/p PCI, dyslipidemia, CHF, COPD, CVA, a-fib, PPM, p/w questionable syncope    *Questionable Syncope  -Tele monitoring  -CTH - chronic lacunar infarcts (has a previous h/o CVA) -> ASA / Statin   -Echo: nl ef  -EKG - NSR 71bpm, non-specific ST / T-wave abnormality   -Cardio consult: no ischemic w/u planned  -Neuro consult : eeg negative  -EP consult: PPM interrogation: no arrythmias noted   -based on exam, pt does not appear to have proper insight for his medical care and I strongly believe he shoul not be driving anymore    *L sided loculated pleural effusion w/ pleural thickening and possible fibrosis noted on CT   -F/u pulm : d/w dr romero, no tap indicated    *Cholelithiasis /  enlarged prostate - incidentally noted on CT  -F/u outpatient     *DM2  -Humalog ISS  -Diabetic diet     *H/o A-fib  -Presently NSR  -inr subtx: subtx->reviewed all of his HIE -> had cabg 2017 with AVR/MVR (T)  -Would be bridge candidate but patient refuses blood draws or SQ meds. Will continue to give coumadin and allow for inr to drift up  -inr on 11/25 was 1.43  -will continue Coumadin   -On coumadin? amiodarone? beta blocker? as per previous discharge in 2017. [facility] No family contact on file to obtain a med list. Will continue current meds for now    *H/o CHF / COPD / CVA / PPM placement  -C/w home meds and f/u outpatient for further management if conditions remain stable during hospitalization     *Med reconciliation  -[facility]    *DVT ppx   -On coumadin     Dispo  -Attempted to reach daughter at [phone] on 11/25/22 @04:30 PM. Unable to contact her. Will re-attempt tomorrow   -unsafe discharge as pt does not seem to have insight
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
## 2022-11-25 NOTE — CHART NOTE - NSCHARTNOTEFT_GEN_A_CORE
Pacemaker Interrogation:    No arrhythmia episodes  22 month left on Battery  R wave 11.2  Impedance 427  Threshold 1.25 V at 0.4 ms.    Single chamber PPM.

## 2022-11-26 NOTE — PROGRESS NOTE ADULT - PROBLEM SELECTOR PLAN 4
? prior Bio MVR / bio AVR  mild elevated velocities , not significant .      patient is being followed [facility] cardiologist
? prior Bio MVR / bio AVR  mild elevated velocities , not significant .      patient is being followed [facility] cardiologist

## 2022-11-26 NOTE — PROGRESS NOTE ADULT - PROBLEM SELECTOR PLAN 1
-PAF now in NSR.   -Primary team has reviewed records.  Was on coumadin in '17.  they have restarted.
-PAF now in NSR.   -Primary team has reviewed records.  Was on coumadin in '17.   sub therapeutic  still   on warfarin
-PAF now in NSR.   -Primary team has reviewed records.  Was on coumadin in '17.  they have restarted.

## 2022-11-26 NOTE — PROGRESS NOTE ADULT - ASSESSMENT
83 y/o M w/ PMH of DM2, HTN, CAD s/p PCI, dyslipidemia, CHF, COPD, CVA, a-fib, PPM, p/w questionable syncope    *Questionable Syncope  -Tele monitoring  -CTH - chronic lacunar infarcts (has a previous h/o CVA) -> ASA / Statin   -Echo: nl ef  -EKG - NSR 71bpm, non-specific ST / T-wave abnormality   -Cardio consult: no ischemic w/u planned  -Neuro consult : eeg negative  -EP consult: PPM interrogation: no arrythmias noted   -based on exam, pt does not appear to have proper insight for his medical care and I strongly believe he shoul not be driving anymore    *L sided loculated pleural effusion w/ pleural thickening and possible fibrosis noted on CT   -F/u pulm : d/w dr romero, no tap indicated    *Cholelithiasis /  enlarged prostate - incidentally noted on CT  -F/u outpatient     *DM2  -Humalog ISS  -Diabetic diet     *H/o A-fib  -Presently NSR  -inr subtx: subtx->reviewed all of his HIE -> had cabg 2017 with AVR/MVR (T)  -Would be bridge candidate but patient refuses blood draws or SQ meds. Will continue to give coumadin and allow for inr to drift up  -inr on 11/25 was 1.43  -will continue Coumadin   -On coumadin? amiodarone? beta blocker? as per previous discharge in 2017. [facility] No family contact on file to obtain a med list. Will continue current meds for now    *H/o CHF / COPD / CVA / PPM placement  -C/w home meds and f/u outpatient for further management if conditions remain stable during hospitalization     *Med reconciliation  -[facility]    *DVT ppx   -On coumadin     Dispo  -Attempted to reach daughter at [phone].  Unable to contact her. Will re-attempt tomorrow   -unsafe discharge as pt does not seem to have insight   -will await CM/SW input   -will be inhouse atleast till Monday

## 2022-11-26 NOTE — PROGRESS NOTE ADULT - PROBLEM SELECTOR PLAN 2
likely dehydration , poor nutritional status -poor historian 2/2 difficulty w/ hearing.  -No events on tele.   normal functioning PPM   low normal EF 50-55% prior cardiac surgery
likely dehydration , poor nutritional status -poor historian 2/2 difficulty w/ hearing.  -No events on tele.   normal functioning PPM   low normal EF 50-55% prior cardiac surgery
-poor historian 2/2 difficulty w/ hearing.  -No events on tele.  -Await PM interrogation.

## 2022-11-27 NOTE — PROGRESS NOTE ADULT - ASSESSMENT
81 y/o M w/ PMH of DM2, HTN, CAD s/p PCI, dyslipidemia, CHF, COPD, CVA, a-fib, PPM, p/w questionable syncope    *Questionable Syncope  -Tele monitoring  -CTH - chronic lacunar infarcts (has a previous h/o CVA) -> ASA / Statin   -Echo: nl ef  -EKG - NSR 71bpm, non-specific ST / T-wave abnormality   -Cardio consult: no ischemic w/u planned  -Neuro consult : eeg negative  -EP consult: PPM interrogation: no arrythmias noted       *L sided loculated pleural effusion w/ pleural thickening and possible fibrosis noted on CT   -F/u pulm : d/w dr romero, no tap indicated    *Cholelithiasis /  enlarged prostate - incidentally noted on CT  -F/u outpatient     *DM2  -Humalog ISS  -Diabetic diet     *H/o A-fib  -Presently NSR  -inr subtx: subtx->reviewed all of his HIE -> had cabg 2017 with AVR/MVR (T)  -Would be bridge candidate but patient refuses blood draws or SQ meds. Will continue to give coumadin and allow for inr to drift up  -continue Comadin   -On coumadin? amiodarone? beta blocker? as per previous discharge in 2017. [facility]    *H/o CHF / COPD / CVA / PPM placement  -C/w home meds and f/u outpatient for further management if conditions remain stable during hospitalization     *Med reconciliation  -[facility]    *DVT ppx   -On coumadin     Dispo  -Attempted to reach daughter at [phone].   -unsafe discharge as pt does not seem to have insight   -will await CM/SW input   -will be inhouse atleast till Monday  81 y/o M w/ PMH of DM2, HTN, CAD s/p PCI, dyslipidemia, CHF, COPD, CVA, a-fib, PPM, p/w questionable syncope    *Questionable Syncope  -Tele monitoring  -CTH - chronic lacunar infarcts (has a previous h/o CVA) -> ASA / Statin   -Echo: nl ef  -EKG - NSR 71bpm, non-specific ST / T-wave abnormality   -Cardio consult: no ischemic w/u planned  -Neuro consult : eeg negative  -EP consult: PPM interrogation: no arrythmias noted       *L sided loculated pleural effusion w/ pleural thickening and possible fibrosis noted on CT   -F/u pulm : d/w dr romero, no tap indicated    *Cholelithiasis /  enlarged prostate - incidentally noted on CT  -F/u outpatient     *DM2  -Humalog ISS  -Diabetic diet     *H/o A-fib  -Presently NSR  -coreg  -continue Comadin       *H/o CHF / COPD / CVA / PPM placement  -C/w home meds and f/u outpatient for further management if conditions remain stable during hospitalization     *Med reconciliation  -[facility]    *DVT ppx   -On coumadin     Dispo  -Attempted to reach daughter at [phone].   -unsafe discharge as pt does not seem to have insight   -will await CM/SW input   -will be inhouse atleast till Monday

## 2022-11-28 NOTE — PHYSICAL THERAPY INITIAL EVALUATION ADULT - SHORT TERM MEMORY, REHAB EVAL
intact
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

## 2022-11-28 NOTE — PROGRESS NOTE ADULT - ASSESSMENT
83 y/o M w/ PMH of DM2, HTN, CAD s/p PCI, dyslipidemia, CHF, COPD, CVA, a-fib, PPM, p/w questionable syncope    *Questionable Syncope  -Tele monitoring  -CTH - chronic lacunar infarcts (has a previous h/o CVA) -> ASA / Statin   -Echo: nl ef  -EKG - NSR 71bpm, non-specific ST / T-wave abnormality   -Cardio consult: no ischemic w/u planned  -Neuro consult : eeg negative  -EP consult: PPM interrogation: no arrythmias noted       *L sided loculated pleural effusion w/ pleural thickening and possible fibrosis noted on CT   -F/u pulm : d/w dr romero, no tap indicated    *Cholelithiasis /  enlarged prostate - incidentally noted on CT  -F/u outpatient     *DM2  -Humalog ISS  -Diabetic diet     *H/o A-fib  -Presently NSR  -inr subtx: subtx->reviewed all of his HIE -> had cabg 2017 with AVR/MVR (T)  -continue Comadin   -On coumadin? amiodarone? beta blocker    *H/o CHF / COPD / CVA / PPM placement  -C/w home meds and f/u outpatient for further management if conditions remain stable during hospitalization     *Med reconciliation  -[facility]    *DVT ppx   -On coumadin     Dispo  -HCP: Manjula  [phone].   -unsafe discharge as pt does not seem to have insight   -CM/SW following  83 y/o M w/ PMH of DM2, HTN, CAD s/p PCI, dyslipidemia, CHF, COPD, CVA, a-fib, PPM, p/w questionable syncope    *Questionable Syncope  -Tele monitoring  -CTH - chronic lacunar infarcts (has a previous h/o CVA) -> ASA / Statin   -Echo: nl ef  -EKG - NSR 71bpm, non-specific ST / T-wave abnormality   -Cardio consult: no ischemic w/u planned  -Neuro consult : eeg negative  -EP consult: PPM interrogation: no arrythmias noted       *L sided loculated pleural effusion w/ pleural thickening and possible fibrosis noted on CT   -F/u pulm : d/w dr romero, no tap indicated    *Cholelithiasis /  enlarged prostate - incidentally noted on CT  -F/u outpatient     *DM2  -Humalog ISS  -Diabetic diet     *H/o A-fib  -Presently NSR  -coreg    *H/o CHF / COPD / CVA / PPM placement  -C/w home meds and f/u outpatient for further management if conditions remain stable during hospitalization     *DVT ppx   -On coumadin     Dispo  -HCP: Manjula  [phone].   -unsafe discharge as pt does not seem to have insight   -CM/SW following

## 2022-11-29 NOTE — PROGRESS NOTE ADULT - ASSESSMENT
83 y/o M w/ PMH of DM2, HTN, CAD s/p PCI, dyslipidemia, CHF, COPD, CVA, a-fib, PPM, p/w questionable syncope    *Questionable Syncope  -Tele monitoring  -CTH - chronic lacunar infarcts (has a previous h/o CVA) -> ASA / Statin   -Echo: nl ef  -EKG - NSR 71bpm, non-specific ST / T-wave abnormality   -Cardio consult: no ischemic w/u planned  -Neuro consult : eeg negative  -EP consult: PPM interrogation: no arrythmias noted       *L sided loculated pleural effusion w/ pleural thickening and possible fibrosis noted on CT   -F/u pulm : d/w dr romero, no tap indicated    *Cholelithiasis /  enlarged prostate - incidentally noted on CT  -F/u outpatient     *DM2  -Humalog ISS  -Diabetic diet     *H/o A-fib  -Presently NSR  -inr subtx: subtx->reviewed all of his HIE -> had cabg 2017 with AVR/MVR (T)  -hold Coumadin   -On coumadin? amiodarone? beta blocker    *H/o CHF / COPD / CVA / PPM placement  -C/w home meds and f/u outpatient for further management if conditions remain stable during hospitalization     *Med reconciliation  -[facility]    *DVT ppx   -On coumadin     Dispo  -HCP: Manjula  [phone].   -unsafe discharge as pt does not seem to have insight   -CM/SW following  81 y/o M w/ PMH of DM2, HTN, CAD s/p PCI, dyslipidemia, CHF, COPD, CVA, a-fib, PPM, p/w questionable syncope    *Questionable Syncope  -Tele monitoring  -CTH - chronic lacunar infarcts (has a previous h/o CVA) -> ASA / Statin   -Echo: nl ef  -EKG - NSR 71bpm, non-specific ST / T-wave abnormality   -Cardio consult: no ischemic w/u planned  -Neuro consult : eeg negative  -EP consult: PPM interrogation: no arrythmias noted       *L sided loculated pleural effusion w/ pleural thickening and possible fibrosis noted on CT   -F/u pulm : d/w dr romero, no tap indicated    *Cholelithiasis /  enlarged prostate - incidentally noted on CT  -F/u outpatient     *DM2  -Humalog ISS  -Diabetic diet     *H/o A-fib  -Presently NSR  -inr subtx: subtx->reviewed all of his HIE -> had cabg 2017 with AVR/MVR (T)  -hold Coumadin   -coreg    *H/o CHF / COPD / CVA / PPM placement  -C/w home meds and f/u outpatient for further management if conditions remain stable during hospitalization     *Med reconciliation  -[facility]    *DVT ppx   -On coumadin     Dispo  -HCP: Manjula  [phone].   -unsafe discharge as pt does not seem to have insight   -CM/SW following

## 2022-11-30 NOTE — PHYSICAL THERAPY INITIAL EVALUATION ADULT - PATIENT PROFILE REVIEW, REHAB EVAL
yes
pt previously evaluated 2 days ago by Kyle Coates DPT but pt was poorly receptive to PT on that date and asked to be discontinued from program, rec'd new MD order to revisit as pt mood improved

## 2022-11-30 NOTE — PHYSICAL THERAPY INITIAL EVALUATION ADULT - STAIR PATTERN, REHAB EVAL
ascends step over step shallow and deep; descends step to technique shallow and deep/step over step/step to step

## 2022-11-30 NOTE — PHYSICAL THERAPY INITIAL EVALUATION ADULT - PHYSICAL ASSIST/NONPHYSICAL ASSIST: GAIT, REHAB EVAL
repeated cues to allow BUEs to swing freely at sides ,keep hands out of pockets and avoid holding hands clasped behind back/verbal cues/nonverbal cues (demo/gestures)/1 person assist

## 2022-11-30 NOTE — PHYSICAL THERAPY INITIAL EVALUATION ADULT - IMPAIRMENTS FOUND, PT EVAL
gait, locomotion, and balance/muscle strength/poor safety awareness/posture
NO Functional Mobility Impairments Found

## 2022-11-30 NOTE — PROGRESS NOTE ADULT - SUBJECTIVE AND OBJECTIVE BOX
Patient seen and examine  feels well  vitals stable  neuro/cards/pulm pending  labs stable    11/25: Seen and evaluated at bedside. Agitated. States there's nothing wrong with him. States " I would like to go my home in [place] and nobody has f*#$ing clue". Refusing exam. Appears disheveled, urine odor noted. Attempted to reach daughter at [phone] unsuccessful. s/p eval by psychiatry. Patient does not seems to have insight.     11/26: Code flight earlier. No acute complaints. Stable     11/27: Pt more calm today. No acute events overnight.     11/28: Agitated. Does not want to talk.       Vital Signs Last 24 Hrs  T(C): 36.3 (28 Nov 2022 15:15), Max: 36.6 (28 Nov 2022 09:49)  T(F): 97.3 (28 Nov 2022 15:15), Max: 97.9 (28 Nov 2022 09:49)  HR: 61 (28 Nov 2022 15:15) (61 - 69)  BP: 117/61 (28 Nov 2022 15:15) (117/61 - 137/60)  BP(mean): --  RR: 18 (28 Nov 2022 15:15) (18 - 18)  SpO2: 100% (28 Nov 2022 15:15) (100% - 100%)    Parameters below as of 28 Nov 2022 15:15  Patient On (Oxygen Delivery Method): room air            PHYSICAL EXAM   refused       Labs:                                   13.6   7.51  )-----------( 143      ( 27 Nov 2022 10:46 )             40.3   11-27    135  |  105  |  20  ----------------------------<  137<H>  4.0   |  27  |  0.91    Ca    8.3<L>      27 Nov 2022 10:46         PT/INR - ( 28 Nov 2022 07:05 )   PT: 24.6 sec;   INR: 2.10 ratio         PTT - ( 28 Nov 2022 07:05 )  PTT:39.5 sec    MEDICATIONS  (STANDING):  aspirin  chewable 81 milliGRAM(s) Oral daily  atorvastatin 80 milliGRAM(s) Oral at bedtime  carvedilol 3.125 milliGRAM(s) Oral every 12 hours  nicotine -   7 mG/24Hr(s) Patch 1 Patch Transdermal daily  OLANZapine Injectable 2.5 milliGRAM(s) IntraMuscular once  warfarin 5 milliGRAM(s) Oral once    MEDICATIONS  (PRN):  acetaminophen     Tablet .. 650 milliGRAM(s) Oral every 6 hours PRN Mild Pain (1 - 3)  albuterol    90 MICROgram(s) HFA Inhaler 2 Puff(s) Inhalation every 6 hours PRN Bronchospasm  ondansetron Injectable 4 milliGRAM(s) IV Push every 6 hours PRN Nausea and/or Vomiting      
Patient seen and examine  feels well  vitals stable  neuro/cards/pulm pending  labs stable    11/25: Seen and evaluated at bedside. Agitated. States there's nothing wrong with him. States " I would like to go my home in [place] and nobody has f*#$ing clue". Refusing exam. Appears disheveled, urine odor noted. Attempted to reach daughter at [phone] unsuccessful. s/p eval by psychiatry. Patient does not seems to have insight.     11/26: Code flight earlier. No acute complaints. Stable     11/27: Pt more calm today. No acute events overnight.     11/28: Agitated. Does not want to talk.     11/29: No acute events       Vital Signs Last 24 Hrs  Vital Signs Last 24 Hrs  T(C): 36.8 (29 Nov 2022 15:08), Max: 36.8 (29 Nov 2022 15:08)  T(F): 98.3 (29 Nov 2022 15:08), Max: 98.3 (29 Nov 2022 15:08)  HR: 64 (29 Nov 2022 21:00) (58 - 67)  BP: 140/69 (29 Nov 2022 21:00) (110/55 - 143/66)  BP(mean): --  RR: 18 (29 Nov 2022 15:08) (18 - 18)  SpO2: 100% (29 Nov 2022 15:08) (98% - 100%)    Parameters below as of 29 Nov 2022 15:08  Patient On (Oxygen Delivery Method): room air              PHYSICAL EXAM   refused       Labs:  PT/INR - ( 29 Nov 2022 07:57 )   PT: 44.3 sec;   INR: 3.77 ratio         PTT - ( 29 Nov 2022 07:57 )  PTT:42.1 sec    MEDICATIONS  (STANDING):  MEDICATIONS  (STANDING):  aspirin  chewable 81 milliGRAM(s) Oral daily  atorvastatin 80 milliGRAM(s) Oral at bedtime  carvedilol 3.125 milliGRAM(s) Oral every 12 hours  melatonin 3 milliGRAM(s) Oral once  nicotine -   7 mG/24Hr(s) Patch 1 Patch Transdermal daily  OLANZapine Injectable 2.5 milliGRAM(s) IntraMuscular once    MEDICATIONS  (PRN):  acetaminophen     Tablet .. 650 milliGRAM(s) Oral every 6 hours PRN Mild Pain (1 - 3)  albuterol    90 MICROgram(s) HFA Inhaler 2 Puff(s) Inhalation every 6 hours PRN Bronchospasm  ondansetron Injectable 4 milliGRAM(s) IV Push every 6 hours PRN Nausea and/or Vomiting      
Patient seen and examine  feels well  vitals stable  neuro/cards/pulm pending  labs stable    11/25: Seen and evaluated at bedside. Agitated. States there's nothing wrong with him. States " I would like to go my home in [place] and nobody has f*#$ing clue". Refusing exam. Appears disheveled, urine odor noted. Attempted to reach daughter at [phone] unsuccessful. s/p eval by psychiatry. Patient does not seems to have insight.     11/26: Code flight earlier. No acute complaints. Stable     11/27: Pt more calm today. No acute events overnight.       Vital Signs Last 24 Hrs  T(C): 36.9 (27 Nov 2022 15:38), Max: 37.1 (26 Nov 2022 22:57)  T(F): 98.4 (27 Nov 2022 15:38), Max: 98.8 (26 Nov 2022 22:57)  HR: 64 (27 Nov 2022 15:38) (64 - 69)  BP: 157/66 (27 Nov 2022 15:38) (137/59 - 157/72)  BP(mean): --  RR: 18 (27 Nov 2022 15:38) (18 - 18)  SpO2: 100% (27 Nov 2022 15:38) (99% - 100%)    Parameters below as of 27 Nov 2022 15:38  Patient On (Oxygen Delivery Method): room air        PHYSICAL EXAM   Gen: NAD, comfortable, Alert and awake, unkept   HEENT: head atrumatic and normocephalic, PEERLA, EOMI,  no gross abnormalities of ears, mucous membranes moist, no oral lesions, neck supple without masses/goiter/lymphadenopathy, no JVD  CVS: +s1, s2, regular rate and rhythm, (+)systolic murmur   Pulmonary: normal respiratory effort, clear to auscultation b/l, no wheezes/crackles/rhonchi  Abdomen: soft, non-tender, non-distended, +bowel sounds in all 4 quadrants, no masses noted, no guarding or rigidity   Back: no scoliosis, lordosis, or kyphosis, no point tenderness, no CVA tenderness   Extremities: no pedal edema, pedal pulses palpable, <2 sec. cap refill   Skin: nl warm and dry, no wounds   Neuro: grossly non-focal         Labs:                        13.6   7.51  )-----------( 143      ( 27 Nov 2022 10:46 )             40.3   11-27    135  |  105  |  20  ----------------------------<  137<H>  4.0   |  27  |  0.91    Ca    8.3<L>      27 Nov 2022 10:46      PT/INR - ( 27 Nov 2022 10:46 )   PT: 17.8 sec;   INR: 1.53 ratio         PTT - ( 27 Nov 2022 10:46 )  PTT:39.6 sec      MEDICATIONS  (STANDING):  aspirin  chewable 81 milliGRAM(s) Oral daily  atorvastatin 80 milliGRAM(s) Oral at bedtime  carvedilol 3.125 milliGRAM(s) Oral every 12 hours  dextrose 5%. 1000 milliLiter(s) (50 mL/Hr) IV Continuous <Continuous>  dextrose 5%. 1000 milliLiter(s) (100 mL/Hr) IV Continuous <Continuous>  dextrose 50% Injectable 25 Gram(s) IV Push once  dextrose 50% Injectable 12.5 Gram(s) IV Push once  dextrose 50% Injectable 25 Gram(s) IV Push once  glucagon  Injectable 1 milliGRAM(s) IntraMuscular once  insulin lispro (ADMELOG) corrective regimen sliding scale   SubCutaneous three times a day before meals  insulin lispro (ADMELOG) corrective regimen sliding scale   SubCutaneous at bedtime  nicotine -   7 mG/24Hr(s) Patch 1 Patch Transdermal daily  OLANZapine Injectable 2.5 milliGRAM(s) IntraMuscular once  warfarin 7.5 milliGRAM(s) Oral once    MEDICATIONS  (PRN):  acetaminophen     Tablet .. 650 milliGRAM(s) Oral every 6 hours PRN Mild Pain (1 - 3)  albuterol    90 MICROgram(s) HFA Inhaler 2 Puff(s) Inhalation every 6 hours PRN Bronchospasm  dextrose Oral Gel 15 Gram(s) Oral once PRN Blood Glucose LESS THAN 70 milliGRAM(s)/deciliter  ondansetron Injectable 4 milliGRAM(s) IV Push every 6 hours PRN Nausea and/or Vomiting      
Interval History:  11/25/22: Patient has no complaints at this time.  Apologetic for "mouthing off"    MEDICATIONS  (STANDING):  aspirin  chewable 81 milliGRAM(s) Oral daily  atorvastatin 80 milliGRAM(s) Oral at bedtime  carvedilol 3.125 milliGRAM(s) Oral every 12 hours  dextrose 5%. 1000 milliLiter(s) (50 mL/Hr) IV Continuous <Continuous>  dextrose 5%. 1000 milliLiter(s) (100 mL/Hr) IV Continuous <Continuous>  dextrose 50% Injectable 25 Gram(s) IV Push once  dextrose 50% Injectable 12.5 Gram(s) IV Push once  dextrose 50% Injectable 25 Gram(s) IV Push once  glucagon  Injectable 1 milliGRAM(s) IntraMuscular once  insulin lispro (ADMELOG) corrective regimen sliding scale   SubCutaneous three times a day before meals  insulin lispro (ADMELOG) corrective regimen sliding scale   SubCutaneous at bedtime  OLANZapine Injectable 2.5 milliGRAM(s) IntraMuscular once  warfarin 5 milliGRAM(s) Oral daily    MEDICATIONS  (PRN):  acetaminophen     Tablet .. 650 milliGRAM(s) Oral every 6 hours PRN Mild Pain (1 - 3)  albuterol    90 MICROgram(s) HFA Inhaler 2 Puff(s) Inhalation every 6 hours PRN Bronchospasm  dextrose Oral Gel 15 Gram(s) Oral once PRN Blood Glucose LESS THAN 70 milliGRAM(s)/deciliter  ondansetron Injectable 4 milliGRAM(s) IV Push every 6 hours PRN Nausea and/or Vomiting      Allergies    penicillin (Anaphylaxis)    Intolerances    [facility] PT (Unknown)      PHYSICAL EXAM:  Vital Signs Last 24 Hrs  T(F): 97.5 (11-25-22 @ 15:03)  HR: 63 (11-25-22 @ 15:03)  BP: 157/76 (11-25-22 @ 15:03)  RR: 18 (11-25-22 @ 15:03)    GENERAL: NAD, well-groomed, well-developed  HEAD:  Atraumatic, Normocephalic  Neuro:  Awake, alert, no aphasia, Knows that he is at [facility] and that it is November 2022.  CN: Hard of hearing, EOMI, no nystagmus, no facial weakness, tongue protrudes in the midline  motor: normal tone, no pronator drift, full strength in all four extremities  sensory: intact to light touch  coordination: no involuntary movements  gait: not tested      LABS:                        12.8   7.81  )-----------( 133      ( 25 Nov 2022 08:22 )             37.7     11-25    138  |  106  |  20  ----------------------------<  105<H>  4.4   |  26  |  0.97    Ca    8.3<L>      25 Nov 2022 08:22      PT/INR - ( 25 Nov 2022 08:22 )   PT: 16.7 sec;   INR: 1.43 ratio               RADIOLOGY & ADDITIONAL STUDIES:  CT head and cervical spine 11/22/22  Head CT: No CT evidence of acute intracranial hemorrhage.  Chronic infarcts.    C-spine CT:  No acute fracture.    EEG 11/23/22: Mild generalized slowing    
Patient seen and examine  feels well  vitals stable  neuro/cards/pulm pending  labs stable    11/25: Seen and evaluated at bedside. Agitated. States there's nothing wrong with him. States " I would like to go my home in [place] and nobody has f*#$ing clue". Refusing exam. Appears disheveled, urine odor noted. Attempted to reach daughter at [phone] unsuccessful. s/p eval by psychiatry. Patient does not seems to have insight.     Review of Systems:        ICU Vital Signs Last 24 Hrs  T(C): 36.9 (23 Nov 2022 21:33), Max: 36.9 (23 Nov 2022 15:05)  T(F): 98.4 (23 Nov 2022 21:33), Max: 98.4 (23 Nov 2022 15:05)  HR: 70 (24 Nov 2022 09:23) (70 - 79)  BP: 144/69 (24 Nov 2022 09:23) (127/62 - 153/84)  BP(mean): --  ABP: --  ABP(mean): --  RR: 19 (24 Nov 2022 09:23) (18 - 19)  SpO2: 97% (24 Nov 2022 09:23) (96% - 100%)    O2 Parameters below as of 24 Nov 2022 09:23  Patient On (Oxygen Delivery Method): room air    Physical Exam  -refused     appear agitated, uncooperative   strong odor or urine noted     Vital Signs Last 24 Hrs  T(C): 36.4 (25 Nov 2022 15:03), Max: 36.5 (24 Nov 2022 16:42)  T(F): 97.5 (25 Nov 2022 15:03), Max: 97.7 (24 Nov 2022 16:42)  HR: 63 (25 Nov 2022 15:03) (61 - 70)  BP: 157/76 (25 Nov 2022 15:03) (130/53 - 157/76)  BP(mean): 76 (25 Nov 2022 15:03) (76 - 76)  RR: 18 (25 Nov 2022 15:03) (18 - 19)  SpO2: 100% (25 Nov 2022 15:03) (98% - 100%)    Parameters below as of 25 Nov 2022 15:03  Patient On (Oxygen Delivery Method): room air          Labs:                        12.8   7.81  )-----------( 133      ( 25 Nov 2022 08:22 )             37.7   11-25    138  |  106  |  20  ----------------------------<  105<H>  4.4   |  26  |  0.97    Ca    8.3<L>      25 Nov 2022 08:22        PT/INR - ( 25 Nov 2022 08:22 )   PT: 16.7 sec;   INR: 1.43 ratio           MEDICATIONS  (STANDING):  aspirin  chewable 81 milliGRAM(s) Oral daily  atorvastatin 80 milliGRAM(s) Oral at bedtime  carvedilol 3.125 milliGRAM(s) Oral every 12 hours  dextrose 5%. 1000 milliLiter(s) (50 mL/Hr) IV Continuous <Continuous>  dextrose 5%. 1000 milliLiter(s) (100 mL/Hr) IV Continuous <Continuous>  dextrose 50% Injectable 25 Gram(s) IV Push once  dextrose 50% Injectable 12.5 Gram(s) IV Push once  dextrose 50% Injectable 25 Gram(s) IV Push once  glucagon  Injectable 1 milliGRAM(s) IntraMuscular once  insulin lispro (ADMELOG) corrective regimen sliding scale   SubCutaneous three times a day before meals  insulin lispro (ADMELOG) corrective regimen sliding scale   SubCutaneous at bedtime  OLANZapine Injectable 2.5 milliGRAM(s) IntraMuscular once  warfarin 5 milliGRAM(s) Oral daily    MEDICATIONS  (PRN):  acetaminophen     Tablet .. 650 milliGRAM(s) Oral every 6 hours PRN Mild Pain (1 - 3)  albuterol    90 MICROgram(s) HFA Inhaler 2 Puff(s) Inhalation every 6 hours PRN Bronchospasm  dextrose Oral Gel 15 Gram(s) Oral once PRN Blood Glucose LESS THAN 70 milliGRAM(s)/deciliter  ondansetron Injectable 4 milliGRAM(s) IV Push every 6 hours PRN Nausea and/or Vomiting    
Patient seen and examine  feels well  vitals stable  neuro/cards/pulm pending  labs stable    11/25: Seen and evaluated at bedside. Agitated. States there's nothing wrong with him. States " I would like to go my home in [place] and nobody has f*#$ing clue". Refusing exam. Appears disheveled, urine odor noted. Attempted to reach daughter at [phone] unsuccessful. s/p eval by psychiatry. Patient does not seems to have insight.     11/26: Code flight earlier. No acute complaints. Stable       Vitals  T(F): 97.3 (11-26-22 @ 08:35), Max: 97.3 (11-26-22 @ 08:35)  HR: 55 (11-26-22 @ 08:35) (55 - 64)  BP: 159/71 (11-26-22 @ 08:35) (118/74 - 159/71)  RR: 18 (11-26-22 @ 08:35) (16 - 18)  SpO2: 99% (11-26-22 @ 08:35) (99% - 99%)    PHYSICAL EXAM   Gen: NAD, comfortable, Alert and awake, unkept   HEENT: head atrumatic and normocephalic, PEERLA, EOMI,  no gross abnormalities of ears, mucous membranes moist, no oral lesions, neck supple without masses/goiter/lymphadenopathy, no JVD  CVS: +s1, s2, regular rate and rhythm, (+)systolic murmur   Pulmonary: normal respiratory effort, clear to auscultation b/l, no wheezes/crackles/rhonchi  Abdomen: soft, non-tender, non-distended, +bowel sounds in all 4 quadrants, no masses noted, no guarding or rigidity   Back: no scoliosis, lordosis, or kyphosis, no point tenderness, no CVA tenderness   Extremities: no pedal edema, pedal pulses palpable, <2 sec. cap refill   Skin: nl warm and dry, no wounds   Neuro: grossly non-focal         Labs:                        12.5   6.29  )-----------( 125      ( 26 Nov 2022 07:03 )             37.2   11-26    137  |  106  |  20  ----------------------------<  104<H>  4.2   |  27  |  0.96    Ca    7.9<L>      26 Nov 2022 07:03    PT/INR - ( 26 Nov 2022 07:03 )   PT: 21.5 sec;   INR: 1.84 ratio         PTT - ( 26 Nov 2022 07:03 )  PTT:36.7 sec     MEDICATIONS  (STANDING):  aspirin  chewable 81 milliGRAM(s) Oral daily  atorvastatin 80 milliGRAM(s) Oral at bedtime  carvedilol 3.125 milliGRAM(s) Oral every 12 hours  dextrose 5%. 1000 milliLiter(s) (50 mL/Hr) IV Continuous <Continuous>  dextrose 5%. 1000 milliLiter(s) (100 mL/Hr) IV Continuous <Continuous>  dextrose 50% Injectable 25 Gram(s) IV Push once  dextrose 50% Injectable 12.5 Gram(s) IV Push once  dextrose 50% Injectable 25 Gram(s) IV Push once  glucagon  Injectable 1 milliGRAM(s) IntraMuscular once  insulin lispro (ADMELOG) corrective regimen sliding scale   SubCutaneous three times a day before meals  insulin lispro (ADMELOG) corrective regimen sliding scale   SubCutaneous at bedtime  nicotine -   7 mG/24Hr(s) Patch 1 Patch Transdermal daily  OLANZapine Injectable 2.5 milliGRAM(s) IntraMuscular once  warfarin 5 milliGRAM(s) Oral daily    MEDICATIONS  (PRN):  acetaminophen     Tablet .. 650 milliGRAM(s) Oral every 6 hours PRN Mild Pain (1 - 3)  albuterol    90 MICROgram(s) HFA Inhaler 2 Puff(s) Inhalation every 6 hours PRN Bronchospasm  dextrose Oral Gel 15 Gram(s) Oral once PRN Blood Glucose LESS THAN 70 milliGRAM(s)/deciliter  ondansetron Injectable 4 milliGRAM(s) IV Push every 6 hours PRN Nausea and/or Vomiting      
Patient seen and examine  feels well  vitals stable  neuro/cards/pulm pending  labs stable    11/25: Seen and evaluated at bedside. Agitated. States there's nothing wrong with him. States " I would like to go my home in [place] and nobody has f*#$ing clue". Refusing exam. Appears disheveled, urine odor noted. Attempted to reach daughter at [phone] unsuccessful. s/p eval by psychiatry. Patient does not seems to have insight.     11/26: Code flight earlier. No acute complaints. Stable     11/27: Pt more calm today. No acute events overnight.     11/28: Agitated. Does not want to talk.     11/29: No acute events     11/30: has no new complaints.     Vital Signs Last 24 Hrs  T(C): 36.5 (30 Nov 2022 15:46), Max: 36.5 (30 Nov 2022 15:46)  T(F): 97.7 (30 Nov 2022 15:46), Max: 97.7 (30 Nov 2022 15:46)  HR: 57 (30 Nov 2022 15:46) (57 - 64)  BP: 121/59 (30 Nov 2022 15:46) (121/59 - 140/69)  RR: 18 (30 Nov 2022 15:46) (18 - 18)  SpO2: 99% (30 Nov 2022 15:46) (97% - 99%)    Parameters below as of 30 Nov 2022 15:46  Patient On (Oxygen Delivery Method): room air        PHYSICAL EXAM   refused     PT/INR - ( 30 Nov 2022 08:36 )   PT: 38.6 sec;   INR: 3.29 ratio         PTT - ( 30 Nov 2022 08:36 )  PTT:44.1 sec    MEDICATIONS  (STANDING):  aspirin  chewable 81 milliGRAM(s) Oral daily  atorvastatin 80 milliGRAM(s) Oral at bedtime  carvedilol 3.125 milliGRAM(s) Oral every 12 hours  melatonin 3 milliGRAM(s) Oral once  nicotine -   7 mG/24Hr(s) Patch 1 Patch Transdermal daily  OLANZapine Injectable 2.5 milliGRAM(s) IntraMuscular once    MEDICATIONS  (PRN):  acetaminophen     Tablet .. 650 milliGRAM(s) Oral every 6 hours PRN Mild Pain (1 - 3)  albuterol    90 MICROgram(s) HFA Inhaler 2 Puff(s) Inhalation every 6 hours PRN Bronchospasm  ondansetron Injectable 4 milliGRAM(s) IV Push every 6 hours PRN Nausea and/or Vomiting      
Patient seen and examine  feels well  vitals stable  neuro/cards/pulm pending  labs stable  Review of Systems:      sub: pt extremely belligerent wont allow for exam or lab draws  ICU Vital Signs Last 24 Hrs  T(C): 36.9 (23 Nov 2022 21:33), Max: 36.9 (23 Nov 2022 15:05)  T(F): 98.4 (23 Nov 2022 21:33), Max: 98.4 (23 Nov 2022 15:05)  HR: 70 (24 Nov 2022 09:23) (70 - 79)  BP: 144/69 (24 Nov 2022 09:23) (127/62 - 153/84)  BP(mean): --  ABP: --  ABP(mean): --  RR: 19 (24 Nov 2022 09:23) (18 - 19)  SpO2: 97% (24 Nov 2022 09:23) (96% - 100%)    O2 Parameters below as of 24 Nov 2022 09:23  Patient On (Oxygen Delivery Method): room air          General:denies fever chills, headache, weakness  HEENT: denies blurry vision,diffculty swallowing, difficulty hearing, tinnitus  Cardiovascular: denies chest pain  ,palpitations  Pulmonary:denies shortness of breath, cough, wheezing, hemoptysis  Gastrointestinal: denies abdominal pain, constipation, diarrhea,nausea , vomiting, hematochezia  : denies hematuria, dysuria, or incontinence  Neurological: denies weakness, numbness , tingling, dizziness, tremors  MSK: denies muscle pain, difficulty ambulating, swelling, back pain  skin: denies skin rash, itching, burning, or  skin lesions  Psychiatrical: denies mood disturbances, anxierty, feeling depressed, depression , or difficulty sleeping    Objective:  Vitals  T(C): 36.8 (11-23-22 @ 08:38), Max: 36.8 (11-23-22 @ 08:38)  HR: 72 (11-23-22 @ 08:38) (62 - 81)  BP: 150/79 (11-23-22 @ 08:38) (112/58 - 155/131)  RR: 18 (11-23-22 @ 08:38) (15 - 20)  SpO2: 98% (11-23-22 @ 08:38) (95% - 100%)    Physical Exam:  General: comfortable, no acute distress, well nourished  HEENT: Atraumatic, no LAD, trachea midline, PERRLA  Cardiovascular: normal s1s2, no murmurs, gallops or fricition rubs  Pulmonary: clear to ausculation Bilaterally, no wheezing , rhonchi  Gastrointestinal: soft non tender non distended, no masses felt, no organomegally  Muscloskeletal: no lower extremity edema, intact bilateral lower extremity pulses  Neurological: CN II-12 intact. No focal weakness  Psychiatrical: normal mood, cooperative  SKIN: no rash, lesions or ulcers    Labs:                          14.0   8.30  )-----------( 141      ( 23 Nov 2022 08:07 )             41.2     11-23    139  |  107  |  15  ----------------------------<  87  4.1   |  28  |  0.79    Ca    8.7      23 Nov 2022 08:07    TPro  6.4  /  Alb  2.7<L>  /  TBili  1.0  /  DBili  x   /  AST  16  /  ALT  15  /  AlkPhos  95  11-23    LIVER FUNCTIONS - ( 23 Nov 2022 08:07 )  Alb: 2.7 g/dL / Pro: 6.4 gm/dL / ALK PHOS: 95 U/L / ALT: 15 U/L / AST: 16 U/L / GGT: x           PT/INR - ( 23 Nov 2022 08:07 )   PT: 13.4 sec;   INR: 1.15 ratio         PTT - ( 23 Nov 2022 08:07 )  PTT:34.6 sec      Active Medications  MEDICATIONS  (STANDING):  aspirin  chewable 81 milliGRAM(s) Oral daily  atorvastatin 80 milliGRAM(s) Oral at bedtime  dextrose 5%. 1000 milliLiter(s) (50 mL/Hr) IV Continuous <Continuous>  dextrose 5%. 1000 milliLiter(s) (100 mL/Hr) IV Continuous <Continuous>  dextrose 50% Injectable 25 Gram(s) IV Push once  dextrose 50% Injectable 12.5 Gram(s) IV Push once  dextrose 50% Injectable 25 Gram(s) IV Push once  glucagon  Injectable 1 milliGRAM(s) IntraMuscular once  insulin lispro (ADMELOG) corrective regimen sliding scale   SubCutaneous three times a day before meals  insulin lispro (ADMELOG) corrective regimen sliding scale   SubCutaneous at bedtime    MEDICATIONS  (PRN):  acetaminophen     Tablet .. 650 milliGRAM(s) Oral every 6 hours PRN Mild Pain (1 - 3)  albuterol    90 MICROgram(s) HFA Inhaler 2 Puff(s) Inhalation every 6 hours PRN Bronchospasm  dextrose Oral Gel 15 Gram(s) Oral once PRN Blood Glucose LESS THAN 70 milliGRAM(s)/deciliter  ondansetron Injectable 4 milliGRAM(s) IV Push every 6 hours PRN Nausea and/or Vomiting    
Patient seen and examine  feels well  vitals stable  neuro/cards/pulm pending  labs stable  Review of Systems:  General:denies fever chills, headache, weakness  HEENT: denies blurry vision,diffculty swallowing, difficulty hearing, tinnitus  Cardiovascular: denies chest pain  ,palpitations  Pulmonary:denies shortness of breath, cough, wheezing, hemoptysis  Gastrointestinal: denies abdominal pain, constipation, diarrhea,nausea , vomiting, hematochezia  : denies hematuria, dysuria, or incontinence  Neurological: denies weakness, numbness , tingling, dizziness, tremors  MSK: denies muscle pain, difficulty ambulating, swelling, back pain  skin: denies skin rash, itching, burning, or  skin lesions  Psychiatrical: denies mood disturbances, anxierty, feeling depressed, depression , or difficulty sleeping    Objective:  Vitals  T(C): 36.8 (11-23-22 @ 08:38), Max: 36.8 (11-23-22 @ 08:38)  HR: 72 (11-23-22 @ 08:38) (62 - 81)  BP: 150/79 (11-23-22 @ 08:38) (112/58 - 155/131)  RR: 18 (11-23-22 @ 08:38) (15 - 20)  SpO2: 98% (11-23-22 @ 08:38) (95% - 100%)    Physical Exam:  General: comfortable, no acute distress, well nourished  HEENT: Atraumatic, no LAD, trachea midline, PERRLA  Cardiovascular: normal s1s2, no murmurs, gallops or fricition rubs  Pulmonary: clear to ausculation Bilaterally, no wheezing , rhonchi  Gastrointestinal: soft non tender non distended, no masses felt, no organomegally  Muscloskeletal: no lower extremity edema, intact bilateral lower extremity pulses  Neurological: CN II-12 intact. No focal weakness  Psychiatrical: normal mood, cooperative  SKIN: no rash, lesions or ulcers    Labs:                          14.0   8.30  )-----------( 141      ( 23 Nov 2022 08:07 )             41.2     11-23    139  |  107  |  15  ----------------------------<  87  4.1   |  28  |  0.79    Ca    8.7      23 Nov 2022 08:07    TPro  6.4  /  Alb  2.7<L>  /  TBili  1.0  /  DBili  x   /  AST  16  /  ALT  15  /  AlkPhos  95  11-23    LIVER FUNCTIONS - ( 23 Nov 2022 08:07 )  Alb: 2.7 g/dL / Pro: 6.4 gm/dL / ALK PHOS: 95 U/L / ALT: 15 U/L / AST: 16 U/L / GGT: x           PT/INR - ( 23 Nov 2022 08:07 )   PT: 13.4 sec;   INR: 1.15 ratio         PTT - ( 23 Nov 2022 08:07 )  PTT:34.6 sec      Active Medications  MEDICATIONS  (STANDING):  aspirin  chewable 81 milliGRAM(s) Oral daily  atorvastatin 80 milliGRAM(s) Oral at bedtime  dextrose 5%.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

## 2022-11-30 NOTE — PHYSICAL THERAPY INITIAL EVALUATION ADULT - PLANNED THERAPY INTERVENTIONS, PT EVAL
PATIENT ANGRILY REFUSING FURTHER ASSISTANCE AND PHYSICAL THERAPY SERVICES. WILL D/C PATIENT OFF PHYSICAL THERAPY AND SIGN OFF AT THIS TIME AT PATIENT'S REQUEST. SAFE TO AMBULATE WITH FLOOR STAFF PRN.
stair training ,home safety/fall prevention/balance training/bed mobility training/gait training/postural re-education/strengthening/transfer training

## 2022-11-30 NOTE — PHYSICAL THERAPY INITIAL EVALUATION ADULT - GENERAL OBSERVATIONS, REHAB EVAL
pt is sitting up at EOB feeding himself lunch meal , waited for him to finish ; pt is dressed in street clothes with boat shoes with tie laces that are on loosly with heelcounters compressed under heels ,pt is [place], there is a amplifier at the bedside loaned to patient to foster effective communications , pt smells of urine , he was mostly pleasant & cooperative on this encounter
Pt rec'd sitting on EOB in NAD. Pt denied any pain or discomfort

## 2022-11-30 NOTE — PHYSICAL THERAPY INITIAL EVALUATION ADULT - LIVES WITH, PROFILE
presently residing in 2nd floor apt with daughter in [place]; dtr works 7 days/week as  and is home in the evenings primarily/children
Unable to obtain accurate home living situation information due to patient's confusion, anger and agitation

## 2022-11-30 NOTE — PROGRESS NOTE ADULT - PROVIDER SPECIALTY LIST ADULT
Hospitalist
Internal Medicine
Internal Medicine
Neurology
Cardiology
Hospitalist
Cardiology
Cardiology

## 2022-11-30 NOTE — PHYSICAL THERAPY INITIAL EVALUATION ADULT - ADDITIONAL COMMENTS
daughter reports resistance to personal care, taking a shower 1x/week ,refusing to wear clean clothes often ,has not taken advice to stop driving
Unable to obtain accurate PLOF due to patient's confusion, anger and agitation

## 2022-11-30 NOTE — PHYSICAL THERAPY INITIAL EVALUATION ADULT - GAIT DEVIATIONS NOTED, PT EVAL
mild FHP; decreased step height B with decrease foot clearance ,2-3 episodes of perturbation of balance ; would benefit from gait training with cane R hand if receptive which I spoke with him about
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
## 2022-11-30 NOTE — PROGRESS NOTE ADULT - ASSESSMENT
81 y/o M w/ PMH of DM2, HTN, CAD s/p PCI, dyslipidemia, CHF, COPD, CVA, a-fib, PPM, p/w questionable syncope    *Questionable Syncope  -Tele monitoring  -CTH - chronic lacunar infarcts (has a previous h/o CVA) -> ASA / Statin   -Echo: nl ef  -EKG - NSR 71bpm, non-specific ST / T-wave abnormality   -Cardio consult: no ischemic w/u planned  -Neuro consult : eeg negative  -EP consult: PPM interrogation: no arrythmias noted       *L sided loculated pleural effusion w/ pleural thickening and possible fibrosis noted on CT   -F/u pulm : d/w dr romero, no tap indicated    *Cholelithiasis /  enlarged prostate - incidentally noted on CT  -F/u outpatient     *DM2  -Humalog ISS  -Diabetic diet     *H/o A-fib  -Presently NSR  -inr subtx: subtx->reviewed all of his HIE -> had cabg 2017 with AVR/MVR (T)  -hold Coumadin   -coreg    *H/o CHF / COPD / CVA / PPM placement  -C/w home meds and f/u outpatient for further management if conditions remain stable during hospitalization     *Med reconciliation  -[facility]    *DVT ppx   -On coumadin     Dispo  -HCP: Manjula  [phone].   -unsafe discharge as pt does not seem to have insight   -CM/SW following

## 2022-12-01 NOTE — DISCHARGE NOTE NURSING/CASE MANAGEMENT/SOCIAL WORK - NSDCPEFALRISK_GEN_ALL_CORE
For information on Fall & Injury Prevention, visit: [email or web address] OR  [email or web address] OR  https://www.cdc.gov/steadi/patient.html

## 2022-12-01 NOTE — DISCHARGE NOTE PROVIDER - NSDCMRMEDTOKEN_GEN_ALL_CORE_FT
albuterol: 2 puff(s) inhaled every 8 hours, As Needed  aspirin 81 mg oral tablet: 1 tab(s) orally once a day  atorvastatin 80 mg oral tablet: 1 tab(s) orally once a day (at bedtime)  carvedilol 3.125 mg oral tablet: 1 tab(s) orally every 12 hours  docusate sodium 100 mg oral capsule: 1 cap(s) orally 3 times a day  furosemide 20 mg oral tablet: 1 tab(s) orally once a day  tiotropium 18 mcg inhalation capsule: 1 cap(s) inhaled once a day  warfarin 4 mg oral tablet: 1 tab(s) orally once a day

## 2022-12-01 NOTE — DISCHARGE NOTE PROVIDER - HOSPITAL COURSE
81 y/o M w/ PMH of DM2, HTN, CAD s/p PCI, dyslipidemia, CHF, COPD, CVA, a-fib, PPM, p/w questionable syncope    *Questionable Syncope  -Tele monitoring  -CTH - chronic lacunar infarcts (has a previous h/o CVA) -> ASA / Statin   -Echo: nl ef  -EKG - NSR 71bpm, non-specific ST / T-wave abnormality   -Cardio consult: no ischemic w/u planned  -Neuro consult : eeg negative  -EP consult: PPM interrogation: no arrhythmias noted       *L sided loculated pleural effusion w/ pleural thickening and possible fibrosis noted on CT   -F/u pulm : d/w dr romero, no tap indicated    *Cholelithiasis /  enlarged prostate - incidentally noted on CT  -F/u outpatient     *DM2  -Humalog ISS  -Diabetic diet Metformin at home I entered on med rec but pt if v slim, a1c is 5.8; I  will dc Metformin pls do BGM at rehab; I think just diet is enough for him    *H/o A-fib  -Presently NSR  -INR 2.2 today restart  Coumadin ar 4mg  -coreg    *H/o CHF / COPD / CVA / PPM placement  -C/w home meds and f/u outpatient for further management if conditions remain stable during hospitalization     *Med reconciliation  -[facility]    *DVT ppx   -On coumadin

## 2022-12-01 NOTE — DISCHARGE NOTE NURSING/CASE MANAGEMENT/SOCIAL WORK - PATIENT PORTAL LINK FT
You can access the FollowMyHealth Patient Portal offered by [facility] by registering at the following website: [email or web address]. By joining [facility]’s FollowMyHealth portal, you will also be able to view your health information using other applications (apps) compatible with our system.

## 2022-12-01 NOTE — DISCHARGE NOTE PROVIDER - NSDCCPCAREPLAN_GEN_ALL_CORE_FT
PRINCIPAL DISCHARGE DIAGNOSIS  Diagnosis: Pleural effusion, left  Assessment and Plan of Treatment: stable no intervention per Pulm, f/up as outpt

## 2022-12-09 NOTE — ED PROVIDER NOTE - PROGRESS NOTE DETAILS
feels much better on bipap, appears calm, comfortable d/w pt and wife that will need to admit pt, request transfer to [place] if possible, as cardiologist there and supposed to have surgery there in a few days Awake/Alert/Cooperative bipap mask removed to give dose amio and coreg, began having worsening SOB again, bipap replaced with improvement

## 2023-01-01 ENCOUNTER — INPATIENT (INPATIENT)
Facility: HOSPITAL | Age: 83
LOS: 8 days | End: 2023-03-11
Attending: STUDENT IN AN ORGANIZED HEALTH CARE EDUCATION/TRAINING PROGRAM | Admitting: STUDENT IN AN ORGANIZED HEALTH CARE EDUCATION/TRAINING PROGRAM
Payer: MEDICARE

## 2023-01-01 ENCOUNTER — EMERGENCY (EMERGENCY)
Facility: HOSPITAL | Age: 83
LOS: 0 days | Discharge: OTHER INSTITUTION | End: 2023-03-01
Attending: EMERGENCY MEDICINE
Payer: MEDICARE

## 2023-01-01 VITALS
SYSTOLIC BLOOD PRESSURE: 134 MMHG | OXYGEN SATURATION: 98 % | DIASTOLIC BLOOD PRESSURE: 59 MMHG | TEMPERATURE: 98 F | HEART RATE: 64 BPM | RESPIRATION RATE: 16 BRPM

## 2023-01-01 VITALS
RESPIRATION RATE: 16 BRPM | TEMPERATURE: 98 F | OXYGEN SATURATION: 100 % | DIASTOLIC BLOOD PRESSURE: 72 MMHG | HEART RATE: 69 BPM | SYSTOLIC BLOOD PRESSURE: 128 MMHG

## 2023-01-01 VITALS
TEMPERATURE: 98 F | OXYGEN SATURATION: 96 % | HEART RATE: 69 BPM | SYSTOLIC BLOOD PRESSURE: 102 MMHG | RESPIRATION RATE: 19 BRPM | DIASTOLIC BLOOD PRESSURE: 61 MMHG

## 2023-01-01 VITALS
HEART RATE: 75 BPM | OXYGEN SATURATION: 100 % | RESPIRATION RATE: 16 BRPM | SYSTOLIC BLOOD PRESSURE: 161 MMHG | TEMPERATURE: 98 F | DIASTOLIC BLOOD PRESSURE: 98 MMHG

## 2023-01-01 DIAGNOSIS — Z95.0 PRESENCE OF CARDIAC PACEMAKER: ICD-10-CM

## 2023-01-01 DIAGNOSIS — Z95.0 PRESENCE OF CARDIAC PACEMAKER: Chronic | ICD-10-CM

## 2023-01-01 DIAGNOSIS — H70.90 UNSPECIFIED MASTOIDITIS, UNSPECIFIED EAR: ICD-10-CM

## 2023-01-01 DIAGNOSIS — Z98.890 OTHER SPECIFIED POSTPROCEDURAL STATES: Chronic | ICD-10-CM

## 2023-01-01 DIAGNOSIS — R53.2 FUNCTIONAL QUADRIPLEGIA: ICD-10-CM

## 2023-01-01 DIAGNOSIS — I11.0 HYPERTENSIVE HEART DISEASE WITH HEART FAILURE: ICD-10-CM

## 2023-01-01 DIAGNOSIS — N39.0 URINARY TRACT INFECTION, SITE NOT SPECIFIED: ICD-10-CM

## 2023-01-01 DIAGNOSIS — R06.00 DYSPNEA, UNSPECIFIED: ICD-10-CM

## 2023-01-01 DIAGNOSIS — Z51.5 ENCOUNTER FOR PALLIATIVE CARE: ICD-10-CM

## 2023-01-01 DIAGNOSIS — Z91.018 ALLERGY TO OTHER FOODS: ICD-10-CM

## 2023-01-01 DIAGNOSIS — I48.91 UNSPECIFIED ATRIAL FIBRILLATION: ICD-10-CM

## 2023-01-01 DIAGNOSIS — H66.90 OTITIS MEDIA, UNSPECIFIED, UNSPECIFIED EAR: ICD-10-CM

## 2023-01-01 DIAGNOSIS — F17.210 NICOTINE DEPENDENCE, CIGARETTES, UNCOMPLICATED: ICD-10-CM

## 2023-01-01 DIAGNOSIS — J18.9 PNEUMONIA, UNSPECIFIED ORGANISM: ICD-10-CM

## 2023-01-01 DIAGNOSIS — Z79.82 LONG TERM (CURRENT) USE OF ASPIRIN: ICD-10-CM

## 2023-01-01 DIAGNOSIS — E11.9 TYPE 2 DIABETES MELLITUS WITHOUT COMPLICATIONS: ICD-10-CM

## 2023-01-01 DIAGNOSIS — G93.49 OTHER ENCEPHALOPATHY: ICD-10-CM

## 2023-01-01 DIAGNOSIS — J44.9 CHRONIC OBSTRUCTIVE PULMONARY DISEASE, UNSPECIFIED: ICD-10-CM

## 2023-01-01 DIAGNOSIS — R41.82 ALTERED MENTAL STATUS, UNSPECIFIED: ICD-10-CM

## 2023-01-01 DIAGNOSIS — I25.10 ATHEROSCLEROTIC HEART DISEASE OF NATIVE CORONARY ARTERY WITHOUT ANGINA PECTORIS: ICD-10-CM

## 2023-01-01 DIAGNOSIS — Z88.0 ALLERGY STATUS TO PENICILLIN: ICD-10-CM

## 2023-01-01 DIAGNOSIS — I25.2 OLD MYOCARDIAL INFARCTION: ICD-10-CM

## 2023-01-01 DIAGNOSIS — Z20.822 CONTACT WITH AND (SUSPECTED) EXPOSURE TO COVID-19: ICD-10-CM

## 2023-01-01 DIAGNOSIS — I48.92 UNSPECIFIED ATRIAL FLUTTER: ICD-10-CM

## 2023-01-01 DIAGNOSIS — Z79.01 LONG TERM (CURRENT) USE OF ANTICOAGULANTS: ICD-10-CM

## 2023-01-01 DIAGNOSIS — Z95.5 PRESENCE OF CORONARY ANGIOPLASTY IMPLANT AND GRAFT: ICD-10-CM

## 2023-01-01 DIAGNOSIS — Z86.73 PERSONAL HISTORY OF TRANSIENT ISCHEMIC ATTACK (TIA), AND CEREBRAL INFARCTION WITHOUT RESIDUAL DEFICITS: ICD-10-CM

## 2023-01-01 DIAGNOSIS — Z87.09 PERSONAL HISTORY OF OTHER DISEASES OF THE RESPIRATORY SYSTEM: ICD-10-CM

## 2023-01-01 DIAGNOSIS — I50.9 HEART FAILURE, UNSPECIFIED: ICD-10-CM

## 2023-01-01 DIAGNOSIS — E78.5 HYPERLIPIDEMIA, UNSPECIFIED: ICD-10-CM

## 2023-01-01 DIAGNOSIS — Z29.9 ENCOUNTER FOR PROPHYLACTIC MEASURES, UNSPECIFIED: ICD-10-CM

## 2023-01-01 LAB
-  BLOOD PCR PANEL: SIGNIFICANT CHANGE UP
ACANTHOCYTES BLD QL SMEAR: SLIGHT — SIGNIFICANT CHANGE UP
ALBUMIN SERPL ELPH-MCNC: 3.1 G/DL — LOW (ref 3.3–5)
ALBUMIN SERPL ELPH-MCNC: 3.5 G/DL — SIGNIFICANT CHANGE UP (ref 3.3–5)
ALP SERPL-CCNC: 80 U/L — SIGNIFICANT CHANGE UP (ref 40–120)
ALP SERPL-CCNC: 85 U/L — SIGNIFICANT CHANGE UP (ref 40–120)
ALT FLD-CCNC: 10 U/L — SIGNIFICANT CHANGE UP (ref 4–41)
ALT FLD-CCNC: 16 U/L — SIGNIFICANT CHANGE UP (ref 12–78)
ANION GAP SERPL CALC-SCNC: 10 MMOL/L — SIGNIFICANT CHANGE UP (ref 7–14)
ANION GAP SERPL CALC-SCNC: 10 MMOL/L — SIGNIFICANT CHANGE UP (ref 7–14)
ANION GAP SERPL CALC-SCNC: 12 MMOL/L — SIGNIFICANT CHANGE UP (ref 7–14)
ANION GAP SERPL CALC-SCNC: 3 MMOL/L — LOW (ref 5–17)
APPEARANCE UR: CLEAR — SIGNIFICANT CHANGE UP
APTT BLD: 31.1 SEC — SIGNIFICANT CHANGE UP (ref 27.5–35.5)
AST SERPL-CCNC: 16 U/L — SIGNIFICANT CHANGE UP (ref 4–40)
AST SERPL-CCNC: 17 U/L — SIGNIFICANT CHANGE UP (ref 15–37)
BACTERIA # UR AUTO: ABNORMAL
BASOPHILS # BLD AUTO: 0.02 K/UL — SIGNIFICANT CHANGE UP (ref 0–0.2)
BASOPHILS # BLD AUTO: 0.03 K/UL — SIGNIFICANT CHANGE UP (ref 0–0.2)
BASOPHILS NFR BLD AUTO: 0.2 % — SIGNIFICANT CHANGE UP (ref 0–2)
BASOPHILS NFR BLD AUTO: 0.4 % — SIGNIFICANT CHANGE UP (ref 0–2)
BILIRUB SERPL-MCNC: 1.4 MG/DL — HIGH (ref 0.2–1.2)
BILIRUB SERPL-MCNC: 1.9 MG/DL — HIGH (ref 0.2–1.2)
BILIRUB UR-MCNC: ABNORMAL
BLOOD GAS ARTERIAL COMPREHENSIVE RESULT: SIGNIFICANT CHANGE UP
BUN SERPL-MCNC: 16 MG/DL — SIGNIFICANT CHANGE UP (ref 7–23)
BUN SERPL-MCNC: 20 MG/DL — SIGNIFICANT CHANGE UP (ref 7–23)
BUN SERPL-MCNC: 24 MG/DL — HIGH (ref 7–23)
BUN SERPL-MCNC: 30 MG/DL — HIGH (ref 7–23)
CALCIUM SERPL-MCNC: 8.8 MG/DL — SIGNIFICANT CHANGE UP (ref 8.4–10.5)
CALCIUM SERPL-MCNC: 9 MG/DL — SIGNIFICANT CHANGE UP (ref 8.5–10.1)
CALCIUM SERPL-MCNC: 9.1 MG/DL — SIGNIFICANT CHANGE UP (ref 8.4–10.5)
CALCIUM SERPL-MCNC: 9.6 MG/DL — SIGNIFICANT CHANGE UP (ref 8.4–10.5)
CHLORIDE SERPL-SCNC: 101 MMOL/L — SIGNIFICANT CHANGE UP (ref 98–107)
CHLORIDE SERPL-SCNC: 103 MMOL/L — SIGNIFICANT CHANGE UP (ref 98–107)
CHLORIDE SERPL-SCNC: 104 MMOL/L — SIGNIFICANT CHANGE UP (ref 96–108)
CHLORIDE SERPL-SCNC: 96 MMOL/L — LOW (ref 98–107)
CO2 SERPL-SCNC: 21 MMOL/L — LOW (ref 22–31)
CO2 SERPL-SCNC: 23 MMOL/L — SIGNIFICANT CHANGE UP (ref 22–31)
CO2 SERPL-SCNC: 26 MMOL/L — SIGNIFICANT CHANGE UP (ref 22–31)
CO2 SERPL-SCNC: 27 MMOL/L — SIGNIFICANT CHANGE UP (ref 22–31)
COLOR SPEC: ABNORMAL
CREAT SERPL-MCNC: 0.95 MG/DL — SIGNIFICANT CHANGE UP (ref 0.5–1.3)
CREAT SERPL-MCNC: 1 MG/DL — SIGNIFICANT CHANGE UP (ref 0.5–1.3)
CREAT SERPL-MCNC: 1.05 MG/DL — SIGNIFICANT CHANGE UP (ref 0.5–1.3)
CREAT SERPL-MCNC: 1.46 MG/DL — HIGH (ref 0.5–1.3)
CULTURE RESULTS: SIGNIFICANT CHANGE UP
DIFF PNL FLD: NEGATIVE — SIGNIFICANT CHANGE UP
EGFR: 48 ML/MIN/1.73M2 — LOW
EGFR: 71 ML/MIN/1.73M2 — SIGNIFICANT CHANGE UP
EGFR: 75 ML/MIN/1.73M2 — SIGNIFICANT CHANGE UP
EGFR: 80 ML/MIN/1.73M2 — SIGNIFICANT CHANGE UP
EOSINOPHIL # BLD AUTO: 0.03 K/UL — SIGNIFICANT CHANGE UP (ref 0–0.5)
EOSINOPHIL # BLD AUTO: 0.07 K/UL — SIGNIFICANT CHANGE UP (ref 0–0.5)
EOSINOPHIL NFR BLD AUTO: 0.3 % — SIGNIFICANT CHANGE UP (ref 0–6)
EOSINOPHIL NFR BLD AUTO: 0.9 % — SIGNIFICANT CHANGE UP (ref 0–6)
EPI CELLS # UR: SIGNIFICANT CHANGE UP
FOLATE SERPL-MCNC: 12 NG/ML — SIGNIFICANT CHANGE UP (ref 3.1–17.5)
GLUCOSE BLDC GLUCOMTR-MCNC: 161 MG/DL — HIGH (ref 70–99)
GLUCOSE SERPL-MCNC: 104 MG/DL — HIGH (ref 70–99)
GLUCOSE SERPL-MCNC: 105 MG/DL — HIGH (ref 70–99)
GLUCOSE SERPL-MCNC: 150 MG/DL — HIGH (ref 70–99)
GLUCOSE SERPL-MCNC: 69 MG/DL — LOW (ref 70–99)
GLUCOSE UR QL: NEGATIVE — SIGNIFICANT CHANGE UP
GRAM STN FLD: SIGNIFICANT CHANGE UP
HCT VFR BLD CALC: 46.1 % — SIGNIFICANT CHANGE UP (ref 39–50)
HCT VFR BLD CALC: 48.5 % — SIGNIFICANT CHANGE UP (ref 39–50)
HCT VFR BLD CALC: 51.3 % — HIGH (ref 39–50)
HCT VFR BLD CALC: 54.1 % — HIGH (ref 39–50)
HGB BLD-MCNC: 15.5 G/DL — SIGNIFICANT CHANGE UP (ref 13–17)
HGB BLD-MCNC: 16.5 G/DL — SIGNIFICANT CHANGE UP (ref 13–17)
HGB BLD-MCNC: 16.8 G/DL — SIGNIFICANT CHANGE UP (ref 13–17)
HGB BLD-MCNC: 18.3 G/DL — HIGH (ref 13–17)
HYALINE CASTS # UR AUTO: ABNORMAL /LPF
IANC: 5.73 K/UL — SIGNIFICANT CHANGE UP (ref 1.8–7.4)
IMM GRANULOCYTES NFR BLD AUTO: 0.3 % — SIGNIFICANT CHANGE UP (ref 0–0.9)
IMM GRANULOCYTES NFR BLD AUTO: 0.4 % — SIGNIFICANT CHANGE UP (ref 0–0.9)
INR BLD: 1.14 RATIO — SIGNIFICANT CHANGE UP (ref 0.88–1.16)
KETONES UR-MCNC: NEGATIVE — SIGNIFICANT CHANGE UP
LACTATE SERPL-SCNC: 1.5 MMOL/L — SIGNIFICANT CHANGE UP (ref 0.7–2)
LACTATE SERPL-SCNC: 2.3 MMOL/L — HIGH (ref 0.7–2)
LEUKOCYTE ESTERASE UR-ACNC: ABNORMAL
LYMPHOCYTES # BLD AUTO: 1.26 K/UL — SIGNIFICANT CHANGE UP (ref 1–3.3)
LYMPHOCYTES # BLD AUTO: 1.33 K/UL — SIGNIFICANT CHANGE UP (ref 1–3.3)
LYMPHOCYTES # BLD AUTO: 14.7 % — SIGNIFICANT CHANGE UP (ref 13–44)
LYMPHOCYTES # BLD AUTO: 16.6 % — SIGNIFICANT CHANGE UP (ref 13–44)
MACROCYTES BLD QL: SLIGHT — SIGNIFICANT CHANGE UP
MAGNESIUM SERPL-MCNC: 1.7 MG/DL — SIGNIFICANT CHANGE UP (ref 1.6–2.6)
MAGNESIUM SERPL-MCNC: 1.8 MG/DL — SIGNIFICANT CHANGE UP (ref 1.6–2.6)
MAGNESIUM SERPL-MCNC: 1.8 MG/DL — SIGNIFICANT CHANGE UP (ref 1.6–2.6)
MANUAL SMEAR VERIFICATION: SIGNIFICANT CHANGE UP
MCHC RBC-ENTMCNC: 30.9 PG — SIGNIFICANT CHANGE UP (ref 27–34)
MCHC RBC-ENTMCNC: 31.4 PG — SIGNIFICANT CHANGE UP (ref 27–34)
MCHC RBC-ENTMCNC: 31.8 PG — SIGNIFICANT CHANGE UP (ref 27–34)
MCHC RBC-ENTMCNC: 32.7 GM/DL — SIGNIFICANT CHANGE UP (ref 32–36)
MCHC RBC-ENTMCNC: 32.7 PG — SIGNIFICANT CHANGE UP (ref 27–34)
MCHC RBC-ENTMCNC: 33.6 GM/DL — SIGNIFICANT CHANGE UP (ref 32–36)
MCHC RBC-ENTMCNC: 33.8 GM/DL — SIGNIFICANT CHANGE UP (ref 32–36)
MCHC RBC-ENTMCNC: 34 GM/DL — SIGNIFICANT CHANGE UP (ref 32–36)
MCV RBC AUTO: 91.4 FL — SIGNIFICANT CHANGE UP (ref 80–100)
MCV RBC AUTO: 93.5 FL — SIGNIFICANT CHANGE UP (ref 80–100)
MCV RBC AUTO: 96.2 FL — SIGNIFICANT CHANGE UP (ref 80–100)
MCV RBC AUTO: 97.2 FL — SIGNIFICANT CHANGE UP (ref 80–100)
METHOD TYPE: SIGNIFICANT CHANGE UP
MONOCYTES # BLD AUTO: 0.72 K/UL — SIGNIFICANT CHANGE UP (ref 0–0.9)
MONOCYTES # BLD AUTO: 0.81 K/UL — SIGNIFICANT CHANGE UP (ref 0–0.9)
MONOCYTES NFR BLD AUTO: 10.1 % — SIGNIFICANT CHANGE UP (ref 2–14)
MONOCYTES NFR BLD AUTO: 8.4 % — SIGNIFICANT CHANGE UP (ref 2–14)
MRSA PCR RESULT.: SIGNIFICANT CHANGE UP
NEUTROPHILS # BLD AUTO: 5.73 K/UL — SIGNIFICANT CHANGE UP (ref 1.8–7.4)
NEUTROPHILS # BLD AUTO: 6.52 K/UL — SIGNIFICANT CHANGE UP (ref 1.8–7.4)
NEUTROPHILS NFR BLD AUTO: 71.6 % — SIGNIFICANT CHANGE UP (ref 43–77)
NEUTROPHILS NFR BLD AUTO: 76.1 % — SIGNIFICANT CHANGE UP (ref 43–77)
NITRITE UR-MCNC: NEGATIVE — SIGNIFICANT CHANGE UP
NRBC # BLD: 0 /100 WBCS — SIGNIFICANT CHANGE UP (ref 0–0)
NRBC # FLD: 0 K/UL — SIGNIFICANT CHANGE UP (ref 0–0)
PH UR: 5 — SIGNIFICANT CHANGE UP (ref 5–8)
PHOSPHATE SERPL-MCNC: 2.4 MG/DL — LOW (ref 2.5–4.5)
PHOSPHATE SERPL-MCNC: 2.6 MG/DL — SIGNIFICANT CHANGE UP (ref 2.5–4.5)
PHOSPHATE SERPL-MCNC: 2.7 MG/DL — SIGNIFICANT CHANGE UP (ref 2.5–4.5)
PLAT MORPH BLD: NORMAL — SIGNIFICANT CHANGE UP
PLATELET # BLD AUTO: 100 K/UL — LOW (ref 150–400)
PLATELET # BLD AUTO: 103 K/UL — LOW (ref 150–400)
PLATELET # BLD AUTO: 112 K/UL — LOW (ref 150–400)
PLATELET # BLD AUTO: 116 K/UL — LOW (ref 150–400)
POTASSIUM SERPL-MCNC: 3.6 MMOL/L — SIGNIFICANT CHANGE UP (ref 3.5–5.3)
POTASSIUM SERPL-MCNC: 4.1 MMOL/L — SIGNIFICANT CHANGE UP (ref 3.5–5.3)
POTASSIUM SERPL-MCNC: 4.4 MMOL/L — SIGNIFICANT CHANGE UP (ref 3.5–5.3)
POTASSIUM SERPL-MCNC: 4.4 MMOL/L — SIGNIFICANT CHANGE UP (ref 3.5–5.3)
POTASSIUM SERPL-SCNC: 3.6 MMOL/L — SIGNIFICANT CHANGE UP (ref 3.5–5.3)
POTASSIUM SERPL-SCNC: 4.1 MMOL/L — SIGNIFICANT CHANGE UP (ref 3.5–5.3)
POTASSIUM SERPL-SCNC: 4.4 MMOL/L — SIGNIFICANT CHANGE UP (ref 3.5–5.3)
POTASSIUM SERPL-SCNC: 4.4 MMOL/L — SIGNIFICANT CHANGE UP (ref 3.5–5.3)
PROT SERPL-MCNC: 6.4 G/DL — SIGNIFICANT CHANGE UP (ref 6–8.3)
PROT SERPL-MCNC: 6.9 GM/DL — SIGNIFICANT CHANGE UP (ref 6–8.3)
PROT UR-MCNC: 30 MG/DL
PROTHROM AB SERPL-ACNC: 13.3 SEC — SIGNIFICANT CHANGE UP (ref 10.5–13.4)
RAPID RVP RESULT: SIGNIFICANT CHANGE UP
RBC # BLD: 4.93 M/UL — SIGNIFICANT CHANGE UP (ref 4.2–5.8)
RBC # BLD: 5.04 M/UL — SIGNIFICANT CHANGE UP (ref 4.2–5.8)
RBC # BLD: 5.28 M/UL — SIGNIFICANT CHANGE UP (ref 4.2–5.8)
RBC # BLD: 5.92 M/UL — HIGH (ref 4.2–5.8)
RBC # FLD: 12.8 % — SIGNIFICANT CHANGE UP (ref 10.3–14.5)
RBC # FLD: 13.2 % — SIGNIFICANT CHANGE UP (ref 10.3–14.5)
RBC # FLD: 13.3 % — SIGNIFICANT CHANGE UP (ref 10.3–14.5)
RBC # FLD: 13.6 % — SIGNIFICANT CHANGE UP (ref 10.3–14.5)
RBC BLD AUTO: ABNORMAL
RBC CASTS # UR COMP ASSIST: ABNORMAL /HPF (ref 0–4)
S AUREUS DNA NOSE QL NAA+PROBE: SIGNIFICANT CHANGE UP
S PNEUM AG UR QL: NEGATIVE — SIGNIFICANT CHANGE UP
SARS-COV-2 RNA SPEC QL NAA+PROBE: SIGNIFICANT CHANGE UP
SARS-COV-2 RNA SPEC QL NAA+PROBE: SIGNIFICANT CHANGE UP
SCHISTOCYTES BLD QL AUTO: SLIGHT — SIGNIFICANT CHANGE UP
SODIUM SERPL-SCNC: 132 MMOL/L — LOW (ref 135–145)
SODIUM SERPL-SCNC: 134 MMOL/L — LOW (ref 135–145)
SODIUM SERPL-SCNC: 134 MMOL/L — LOW (ref 135–145)
SODIUM SERPL-SCNC: 136 MMOL/L — SIGNIFICANT CHANGE UP (ref 135–145)
SP GR SPEC: 1.02 — SIGNIFICANT CHANGE UP (ref 1.01–1.02)
SPECIMEN SOURCE: SIGNIFICANT CHANGE UP
T PALLIDUM AB TITR SER: NEGATIVE — SIGNIFICANT CHANGE UP
TROPONIN I, HIGH SENSITIVITY RESULT: 10.86 NG/L — SIGNIFICANT CHANGE UP
UROBILINOGEN FLD QL: 4
VANCOMYCIN TROUGH SERPL-MCNC: 11.8 UG/ML — SIGNIFICANT CHANGE UP (ref 10–20)
VIT B12 SERPL-MCNC: 435 PG/ML — SIGNIFICANT CHANGE UP (ref 200–900)
WBC # BLD: 10.34 K/UL — SIGNIFICANT CHANGE UP (ref 3.8–10.5)
WBC # BLD: 8 K/UL — SIGNIFICANT CHANGE UP (ref 3.8–10.5)
WBC # BLD: 8.58 K/UL — SIGNIFICANT CHANGE UP (ref 3.8–10.5)
WBC # BLD: 8.59 K/UL — SIGNIFICANT CHANGE UP (ref 3.8–10.5)
WBC # FLD AUTO: 10.34 K/UL — SIGNIFICANT CHANGE UP (ref 3.8–10.5)
WBC # FLD AUTO: 8 K/UL — SIGNIFICANT CHANGE UP (ref 3.8–10.5)
WBC # FLD AUTO: 8.58 K/UL — SIGNIFICANT CHANGE UP (ref 3.8–10.5)
WBC # FLD AUTO: 8.59 K/UL — SIGNIFICANT CHANGE UP (ref 3.8–10.5)
WBC UR QL: SIGNIFICANT CHANGE UP /HPF (ref 0–5)

## 2023-01-01 PROCEDURE — 96374 THER/PROPH/DIAG INJ IV PUSH: CPT

## 2023-01-01 PROCEDURE — 99233 SBSQ HOSP IP/OBS HIGH 50: CPT

## 2023-01-01 PROCEDURE — 87086 URINE CULTURE/COLONY COUNT: CPT

## 2023-01-01 PROCEDURE — 82962 GLUCOSE BLOOD TEST: CPT

## 2023-01-01 PROCEDURE — 71045 X-RAY EXAM CHEST 1 VIEW: CPT | Mod: 26

## 2023-01-01 PROCEDURE — 99233 SBSQ HOSP IP/OBS HIGH 50: CPT | Mod: GC

## 2023-01-01 PROCEDURE — 36415 COLL VENOUS BLD VENIPUNCTURE: CPT

## 2023-01-01 PROCEDURE — 81001 URINALYSIS AUTO W/SCOPE: CPT

## 2023-01-01 PROCEDURE — 99232 SBSQ HOSP IP/OBS MODERATE 35: CPT

## 2023-01-01 PROCEDURE — 96375 TX/PRO/DX INJ NEW DRUG ADDON: CPT

## 2023-01-01 PROCEDURE — 71250 CT THORAX DX C-: CPT | Mod: 26

## 2023-01-01 PROCEDURE — 99285 EMERGENCY DEPT VISIT HI MDM: CPT

## 2023-01-01 PROCEDURE — 70450 CT HEAD/BRAIN W/O DYE: CPT | Mod: 26,MA

## 2023-01-01 PROCEDURE — 71045 X-RAY EXAM CHEST 1 VIEW: CPT

## 2023-01-01 PROCEDURE — 85730 THROMBOPLASTIN TIME PARTIAL: CPT

## 2023-01-01 PROCEDURE — 70450 CT HEAD/BRAIN W/O DYE: CPT | Mod: MA

## 2023-01-01 PROCEDURE — 87077 CULTURE AEROBIC IDENTIFY: CPT

## 2023-01-01 PROCEDURE — 99285 EMERGENCY DEPT VISIT HI MDM: CPT | Mod: 25

## 2023-01-01 PROCEDURE — 99222 1ST HOSP IP/OBS MODERATE 55: CPT | Mod: GC

## 2023-01-01 PROCEDURE — 83605 ASSAY OF LACTIC ACID: CPT

## 2023-01-01 PROCEDURE — 99223 1ST HOSP IP/OBS HIGH 75: CPT | Mod: GC

## 2023-01-01 PROCEDURE — 0225U NFCT DS DNA&RNA 21 SARSCOV2: CPT

## 2023-01-01 PROCEDURE — 93005 ELECTROCARDIOGRAM TRACING: CPT

## 2023-01-01 PROCEDURE — 87040 BLOOD CULTURE FOR BACTERIA: CPT

## 2023-01-01 PROCEDURE — 99223 1ST HOSP IP/OBS HIGH 75: CPT

## 2023-01-01 PROCEDURE — 99232 SBSQ HOSP IP/OBS MODERATE 35: CPT | Mod: GC

## 2023-01-01 PROCEDURE — 93010 ELECTROCARDIOGRAM REPORT: CPT

## 2023-01-01 PROCEDURE — 85610 PROTHROMBIN TIME: CPT

## 2023-01-01 PROCEDURE — 85025 COMPLETE CBC W/AUTO DIFF WBC: CPT

## 2023-01-01 PROCEDURE — 87150 DNA/RNA AMPLIFIED PROBE: CPT

## 2023-01-01 PROCEDURE — 84484 ASSAY OF TROPONIN QUANT: CPT

## 2023-01-01 PROCEDURE — 80053 COMPREHEN METABOLIC PANEL: CPT

## 2023-01-01 RX ORDER — CARVEDILOL PHOSPHATE 80 MG/1
3.12 CAPSULE, EXTENDED RELEASE ORAL EVERY 12 HOURS
Refills: 0 | Status: DISCONTINUED | OUTPATIENT
Start: 2023-01-01 | End: 2023-01-01

## 2023-01-01 RX ORDER — AZITHROMYCIN 500 MG/1
500 TABLET, FILM COATED ORAL EVERY 24 HOURS
Refills: 0 | Status: DISCONTINUED | OUTPATIENT
Start: 2023-01-01 | End: 2023-01-01

## 2023-01-01 RX ORDER — HYDROMORPHONE HYDROCHLORIDE 2 MG/ML
0.2 INJECTION INTRAMUSCULAR; INTRAVENOUS; SUBCUTANEOUS
Refills: 0 | Status: DISCONTINUED | OUTPATIENT
Start: 2023-01-01 | End: 2023-01-01

## 2023-01-01 RX ORDER — LANOLIN ALCOHOL/MO/W.PET/CERES
3 CREAM (GRAM) TOPICAL AT BEDTIME
Refills: 0 | Status: DISCONTINUED | OUTPATIENT
Start: 2023-01-01 | End: 2023-01-01

## 2023-01-01 RX ORDER — CEFEPIME 1 G/1
1000 INJECTION, POWDER, FOR SOLUTION INTRAMUSCULAR; INTRAVENOUS ONCE
Refills: 0 | Status: COMPLETED | OUTPATIENT
Start: 2023-01-01 | End: 2023-01-01

## 2023-01-01 RX ORDER — METFORMIN HYDROCHLORIDE 850 MG/1
0 TABLET ORAL
Qty: 0 | Refills: 0 | DISCHARGE

## 2023-01-01 RX ORDER — IPRATROPIUM/ALBUTEROL SULFATE 18-103MCG
3 AEROSOL WITH ADAPTER (GRAM) INHALATION EVERY 4 HOURS
Refills: 0 | Status: DISCONTINUED | OUTPATIENT
Start: 2023-01-01 | End: 2023-01-01

## 2023-01-01 RX ORDER — INFLUENZA VIRUS VACCINE 15; 15; 15; 15 UG/.5ML; UG/.5ML; UG/.5ML; UG/.5ML
0.7 SUSPENSION INTRAMUSCULAR ONCE
Refills: 0 | Status: DISCONTINUED | OUTPATIENT
Start: 2023-01-01 | End: 2023-01-01

## 2023-01-01 RX ORDER — CEFTRIAXONE 500 MG/1
1000 INJECTION, POWDER, FOR SOLUTION INTRAMUSCULAR; INTRAVENOUS ONCE
Refills: 0 | Status: COMPLETED | OUTPATIENT
Start: 2023-01-01 | End: 2023-01-01

## 2023-01-01 RX ORDER — LISINOPRIL 2.5 MG/1
1 TABLET ORAL
Qty: 0 | Refills: 0 | DISCHARGE

## 2023-01-01 RX ORDER — HYDROMORPHONE HYDROCHLORIDE 2 MG/ML
0.5 INJECTION INTRAMUSCULAR; INTRAVENOUS; SUBCUTANEOUS
Refills: 0 | Status: DISCONTINUED | OUTPATIENT
Start: 2023-01-01 | End: 2023-01-01

## 2023-01-01 RX ORDER — ENOXAPARIN SODIUM 100 MG/ML
40 INJECTION SUBCUTANEOUS EVERY 24 HOURS
Refills: 0 | Status: DISCONTINUED | OUTPATIENT
Start: 2023-01-01 | End: 2023-01-01

## 2023-01-01 RX ORDER — METRONIDAZOLE 500 MG
500 TABLET ORAL ONCE
Refills: 0 | Status: COMPLETED | OUTPATIENT
Start: 2023-01-01 | End: 2023-01-01

## 2023-01-01 RX ORDER — PIPERACILLIN AND TAZOBACTAM 4; .5 G/20ML; G/20ML
3.38 INJECTION, POWDER, LYOPHILIZED, FOR SOLUTION INTRAVENOUS ONCE
Refills: 0 | Status: DISCONTINUED | OUTPATIENT
Start: 2023-01-01 | End: 2023-01-01

## 2023-01-01 RX ORDER — SODIUM CHLORIDE 9 MG/ML
1000 INJECTION, SOLUTION INTRAVENOUS
Refills: 0 | Status: DISCONTINUED | OUTPATIENT
Start: 2023-01-01 | End: 2023-01-01

## 2023-01-01 RX ORDER — ATORVASTATIN CALCIUM 80 MG/1
80 TABLET, FILM COATED ORAL AT BEDTIME
Refills: 0 | Status: DISCONTINUED | OUTPATIENT
Start: 2023-01-01 | End: 2023-01-01

## 2023-01-01 RX ORDER — CEFTRIAXONE 500 MG/1
1000 INJECTION, POWDER, FOR SOLUTION INTRAMUSCULAR; INTRAVENOUS EVERY 24 HOURS
Refills: 0 | Status: DISCONTINUED | OUTPATIENT
Start: 2023-01-01 | End: 2023-01-01

## 2023-01-01 RX ORDER — VANCOMYCIN HCL 1 G
1000 VIAL (EA) INTRAVENOUS EVERY 12 HOURS
Refills: 0 | Status: DISCONTINUED | OUTPATIENT
Start: 2023-01-01 | End: 2023-01-01

## 2023-01-01 RX ORDER — POTASSIUM CHLORIDE 20 MEQ
10 PACKET (EA) ORAL
Refills: 0 | Status: COMPLETED | OUTPATIENT
Start: 2023-01-01 | End: 2023-01-01

## 2023-01-01 RX ORDER — GLUCAGON INJECTION, SOLUTION 0.5 MG/.1ML
1 INJECTION, SOLUTION SUBCUTANEOUS ONCE
Refills: 0 | Status: DISCONTINUED | OUTPATIENT
Start: 2023-01-01 | End: 2023-01-01

## 2023-01-01 RX ORDER — SODIUM CHLORIDE 0.65 %
1 AEROSOL, SPRAY (ML) NASAL
Refills: 0 | Status: DISCONTINUED | OUTPATIENT
Start: 2023-01-01 | End: 2023-01-01

## 2023-01-01 RX ORDER — SODIUM CHLORIDE 9 MG/ML
4 INJECTION INTRAMUSCULAR; INTRAVENOUS; SUBCUTANEOUS EVERY 12 HOURS
Refills: 0 | Status: DISCONTINUED | OUTPATIENT
Start: 2023-01-01 | End: 2023-01-01

## 2023-01-01 RX ORDER — DEXTROSE 50 % IN WATER 50 %
25 SYRINGE (ML) INTRAVENOUS ONCE
Refills: 0 | Status: DISCONTINUED | OUTPATIENT
Start: 2023-01-01 | End: 2023-01-01

## 2023-01-01 RX ORDER — HYDROMORPHONE HYDROCHLORIDE 2 MG/ML
0.2 INJECTION INTRAMUSCULAR; INTRAVENOUS; SUBCUTANEOUS EVERY 8 HOURS
Refills: 0 | Status: DISCONTINUED | OUTPATIENT
Start: 2023-01-01 | End: 2023-01-01

## 2023-01-01 RX ORDER — ACETAMINOPHEN 500 MG
650 TABLET ORAL EVERY 6 HOURS
Refills: 0 | Status: DISCONTINUED | OUTPATIENT
Start: 2023-01-01 | End: 2023-01-01

## 2023-01-01 RX ORDER — OLANZAPINE 15 MG/1
2.5 TABLET, FILM COATED ORAL ONCE
Refills: 0 | Status: COMPLETED | OUTPATIENT
Start: 2023-01-01 | End: 2023-01-01

## 2023-01-01 RX ORDER — SODIUM CHLORIDE 9 MG/ML
1000 INJECTION INTRAMUSCULAR; INTRAVENOUS; SUBCUTANEOUS ONCE
Refills: 0 | Status: COMPLETED | OUTPATIENT
Start: 2023-01-01 | End: 2023-01-01

## 2023-01-01 RX ORDER — METRONIDAZOLE 500 MG
TABLET ORAL
Refills: 0 | Status: DISCONTINUED | OUTPATIENT
Start: 2023-01-01 | End: 2023-01-01

## 2023-01-01 RX ORDER — OLANZAPINE 15 MG/1
2.5 TABLET, FILM COATED ORAL ONCE
Refills: 0 | Status: DISCONTINUED | OUTPATIENT
Start: 2023-01-01 | End: 2023-01-01

## 2023-01-01 RX ORDER — METRONIDAZOLE 500 MG
500 TABLET ORAL EVERY 8 HOURS
Refills: 0 | Status: DISCONTINUED | OUTPATIENT
Start: 2023-01-01 | End: 2023-01-01

## 2023-01-01 RX ORDER — DEXTROSE 50 % IN WATER 50 %
12.5 SYRINGE (ML) INTRAVENOUS ONCE
Refills: 0 | Status: DISCONTINUED | OUTPATIENT
Start: 2023-01-01 | End: 2023-01-01

## 2023-01-01 RX ORDER — MAGNESIUM SULFATE 500 MG/ML
2 VIAL (ML) INJECTION ONCE
Refills: 0 | Status: COMPLETED | OUTPATIENT
Start: 2023-01-01 | End: 2023-01-01

## 2023-01-01 RX ORDER — CEFEPIME 1 G/1
1000 INJECTION, POWDER, FOR SOLUTION INTRAMUSCULAR; INTRAVENOUS DAILY
Refills: 0 | Status: DISCONTINUED | OUTPATIENT
Start: 2023-01-01 | End: 2023-01-01

## 2023-01-01 RX ORDER — ALBUTEROL 90 UG/1
2 AEROSOL, METERED ORAL
Qty: 0 | Refills: 0 | DISCHARGE

## 2023-01-01 RX ORDER — CEFEPIME 1 G/1
1000 INJECTION, POWDER, FOR SOLUTION INTRAMUSCULAR; INTRAVENOUS ONCE
Refills: 0 | Status: DISCONTINUED | OUTPATIENT
Start: 2023-01-01 | End: 2023-01-01

## 2023-01-01 RX ORDER — NICOTINE POLACRILEX 2 MG
1 GUM BUCCAL EVERY 24 HOURS
Refills: 0 | Status: DISCONTINUED | OUTPATIENT
Start: 2023-01-01 | End: 2023-01-01

## 2023-01-01 RX ORDER — AZITHROMYCIN 500 MG/1
500 TABLET, FILM COATED ORAL DAILY
Refills: 0 | Status: DISCONTINUED | OUTPATIENT
Start: 2023-01-01 | End: 2023-01-01

## 2023-01-01 RX ORDER — ROBINUL 0.2 MG/ML
0.4 INJECTION INTRAMUSCULAR; INTRAVENOUS EVERY 6 HOURS
Refills: 0 | Status: DISCONTINUED | OUTPATIENT
Start: 2023-01-01 | End: 2023-01-01

## 2023-01-01 RX ORDER — INSULIN LISPRO 100/ML
VIAL (ML) SUBCUTANEOUS AT BEDTIME
Refills: 0 | Status: DISCONTINUED | OUTPATIENT
Start: 2023-01-01 | End: 2023-01-01

## 2023-01-01 RX ORDER — VANCOMYCIN HCL 1 G
1000 VIAL (EA) INTRAVENOUS ONCE
Refills: 0 | Status: COMPLETED | OUTPATIENT
Start: 2023-01-01 | End: 2023-01-01

## 2023-01-01 RX ORDER — BUDESONIDE AND FORMOTEROL FUMARATE DIHYDRATE 160; 4.5 UG/1; UG/1
2 AEROSOL RESPIRATORY (INHALATION)
Refills: 0 | Status: DISCONTINUED | OUTPATIENT
Start: 2023-01-01 | End: 2023-01-01

## 2023-01-01 RX ORDER — CEFEPIME 1 G/1
1000 INJECTION, POWDER, FOR SOLUTION INTRAMUSCULAR; INTRAVENOUS EVERY 12 HOURS
Refills: 0 | Status: DISCONTINUED | OUTPATIENT
Start: 2023-01-01 | End: 2023-01-01

## 2023-01-01 RX ORDER — CARBAMIDE PEROXIDE 81.86 MG/ML
8 SOLUTION/ DROPS AURICULAR (OTIC)
Refills: 0 | Status: COMPLETED | OUTPATIENT
Start: 2023-01-01 | End: 2023-01-01

## 2023-01-01 RX ORDER — MAGNESIUM SULFATE 500 MG/ML
1 VIAL (ML) INJECTION ONCE
Refills: 0 | Status: COMPLETED | OUTPATIENT
Start: 2023-01-01 | End: 2023-01-01

## 2023-01-01 RX ORDER — CARVEDILOL PHOSPHATE 80 MG/1
0 CAPSULE, EXTENDED RELEASE ORAL
Qty: 0 | Refills: 0 | DISCHARGE

## 2023-01-01 RX ORDER — INSULIN LISPRO 100/ML
VIAL (ML) SUBCUTANEOUS
Refills: 0 | Status: DISCONTINUED | OUTPATIENT
Start: 2023-01-01 | End: 2023-01-01

## 2023-01-01 RX ORDER — DEXTROSE 50 % IN WATER 50 %
15 SYRINGE (ML) INTRAVENOUS ONCE
Refills: 0 | Status: DISCONTINUED | OUTPATIENT
Start: 2023-01-01 | End: 2023-01-01

## 2023-01-01 RX ADMIN — Medication 3 MILLIGRAM(S): at 21:29

## 2023-01-01 RX ADMIN — CEFEPIME 100 MILLIGRAM(S): 1 INJECTION, POWDER, FOR SOLUTION INTRAMUSCULAR; INTRAVENOUS at 18:12

## 2023-01-01 RX ADMIN — Medication 100 MILLIGRAM(S): at 22:18

## 2023-01-01 RX ADMIN — ENOXAPARIN SODIUM 40 MILLIGRAM(S): 100 INJECTION SUBCUTANEOUS at 17:14

## 2023-01-01 RX ADMIN — Medication 1 PATCH: at 07:28

## 2023-01-01 RX ADMIN — Medication 100 MILLIGRAM(S): at 22:19

## 2023-01-01 RX ADMIN — HYDROMORPHONE HYDROCHLORIDE 0.2 MILLIGRAM(S): 2 INJECTION INTRAMUSCULAR; INTRAVENOUS; SUBCUTANEOUS at 05:35

## 2023-01-01 RX ADMIN — HYDROMORPHONE HYDROCHLORIDE 0.2 MILLIGRAM(S): 2 INJECTION INTRAMUSCULAR; INTRAVENOUS; SUBCUTANEOUS at 15:04

## 2023-01-01 RX ADMIN — CARBAMIDE PEROXIDE 8 DROP(S): 81.86 SOLUTION/ DROPS AURICULAR (OTIC) at 05:41

## 2023-01-01 RX ADMIN — Medication 1 PATCH: at 08:13

## 2023-01-01 RX ADMIN — Medication 1 PATCH: at 17:05

## 2023-01-01 RX ADMIN — Medication 250 MILLIGRAM(S): at 20:13

## 2023-01-01 RX ADMIN — CEFEPIME 100 MILLIGRAM(S): 1 INJECTION, POWDER, FOR SOLUTION INTRAMUSCULAR; INTRAVENOUS at 17:31

## 2023-01-01 RX ADMIN — HYDROMORPHONE HYDROCHLORIDE 0.2 MILLIGRAM(S): 2 INJECTION INTRAMUSCULAR; INTRAVENOUS; SUBCUTANEOUS at 20:46

## 2023-01-01 RX ADMIN — Medication 1 SPRAY(S): at 17:13

## 2023-01-01 RX ADMIN — AZITHROMYCIN 255 MILLIGRAM(S): 500 TABLET, FILM COATED ORAL at 17:58

## 2023-01-01 RX ADMIN — CEFEPIME 1000 MILLIGRAM(S): 1 INJECTION, POWDER, FOR SOLUTION INTRAMUSCULAR; INTRAVENOUS at 21:31

## 2023-01-01 RX ADMIN — Medication 1 PATCH: at 17:25

## 2023-01-01 RX ADMIN — Medication 1 SPRAY(S): at 06:17

## 2023-01-01 RX ADMIN — Medication 3 MILLILITER(S): at 10:57

## 2023-01-01 RX ADMIN — Medication 1 PATCH: at 19:37

## 2023-01-01 RX ADMIN — Medication 1 PATCH: at 07:29

## 2023-01-01 RX ADMIN — Medication 1 PATCH: at 19:17

## 2023-01-01 RX ADMIN — CEFTRIAXONE 100 MILLIGRAM(S): 500 INJECTION, POWDER, FOR SOLUTION INTRAMUSCULAR; INTRAVENOUS at 18:17

## 2023-01-01 RX ADMIN — Medication 1 PATCH: at 17:15

## 2023-01-01 RX ADMIN — Medication 1 PATCH: at 19:10

## 2023-01-01 RX ADMIN — Medication 250 MILLIGRAM(S): at 09:18

## 2023-01-01 RX ADMIN — HYDROMORPHONE HYDROCHLORIDE 0.2 MILLIGRAM(S): 2 INJECTION INTRAMUSCULAR; INTRAVENOUS; SUBCUTANEOUS at 05:19

## 2023-01-01 RX ADMIN — CARVEDILOL PHOSPHATE 3.12 MILLIGRAM(S): 80 CAPSULE, EXTENDED RELEASE ORAL at 17:30

## 2023-01-01 RX ADMIN — Medication 250 MILLIGRAM(S): at 21:31

## 2023-01-01 RX ADMIN — Medication 1 PATCH: at 17:12

## 2023-01-01 RX ADMIN — SODIUM CHLORIDE 1000 MILLILITER(S): 9 INJECTION INTRAMUSCULAR; INTRAVENOUS; SUBCUTANEOUS at 19:30

## 2023-01-01 RX ADMIN — HYDROMORPHONE HYDROCHLORIDE 0.2 MILLIGRAM(S): 2 INJECTION INTRAMUSCULAR; INTRAVENOUS; SUBCUTANEOUS at 20:31

## 2023-01-01 RX ADMIN — HYDROMORPHONE HYDROCHLORIDE 0.5 MILLIGRAM(S): 2 INJECTION INTRAMUSCULAR; INTRAVENOUS; SUBCUTANEOUS at 14:18

## 2023-01-01 RX ADMIN — HYDROMORPHONE HYDROCHLORIDE 0.5 MILLIGRAM(S): 2 INJECTION INTRAMUSCULAR; INTRAVENOUS; SUBCUTANEOUS at 14:00

## 2023-01-01 RX ADMIN — AZITHROMYCIN 255 MILLIGRAM(S): 500 TABLET, FILM COATED ORAL at 17:16

## 2023-01-01 RX ADMIN — Medication 1: at 13:41

## 2023-01-01 RX ADMIN — SODIUM CHLORIDE 4 MILLILITER(S): 9 INJECTION INTRAMUSCULAR; INTRAVENOUS; SUBCUTANEOUS at 19:43

## 2023-01-01 RX ADMIN — OLANZAPINE 2.5 MILLIGRAM(S): 15 TABLET, FILM COATED ORAL at 03:47

## 2023-01-01 RX ADMIN — Medication 250 MILLIGRAM(S): at 08:50

## 2023-01-01 RX ADMIN — Medication 250 MILLIGRAM(S): at 00:07

## 2023-01-01 RX ADMIN — CEFEPIME 100 MILLIGRAM(S): 1 INJECTION, POWDER, FOR SOLUTION INTRAMUSCULAR; INTRAVENOUS at 05:29

## 2023-01-01 RX ADMIN — Medication 100 MILLIGRAM(S): at 13:45

## 2023-01-01 RX ADMIN — ENOXAPARIN SODIUM 40 MILLIGRAM(S): 100 INJECTION SUBCUTANEOUS at 17:06

## 2023-01-01 RX ADMIN — Medication 1 SPRAY(S): at 17:11

## 2023-01-01 RX ADMIN — Medication 100 MILLIGRAM(S): at 05:33

## 2023-01-01 RX ADMIN — Medication 100 MILLIGRAM(S): at 05:19

## 2023-01-01 RX ADMIN — Medication 1 PATCH: at 17:22

## 2023-01-01 RX ADMIN — Medication 250 MILLIGRAM(S): at 13:13

## 2023-01-01 RX ADMIN — CEFEPIME 100 MILLIGRAM(S): 1 INJECTION, POWDER, FOR SOLUTION INTRAMUSCULAR; INTRAVENOUS at 05:06

## 2023-01-01 RX ADMIN — Medication 3 MILLILITER(S): at 16:23

## 2023-01-01 RX ADMIN — Medication 1 SPRAY(S): at 17:06

## 2023-01-01 RX ADMIN — Medication 100 MILLIGRAM(S): at 06:35

## 2023-01-01 RX ADMIN — CEFEPIME 100 MILLIGRAM(S): 1 INJECTION, POWDER, FOR SOLUTION INTRAMUSCULAR; INTRAVENOUS at 19:26

## 2023-01-01 RX ADMIN — Medication 1 PATCH: at 17:30

## 2023-01-01 RX ADMIN — Medication 1 PATCH: at 07:45

## 2023-01-01 RX ADMIN — CEFEPIME 100 MILLIGRAM(S): 1 INJECTION, POWDER, FOR SOLUTION INTRAMUSCULAR; INTRAVENOUS at 12:19

## 2023-01-01 RX ADMIN — Medication 3 MILLILITER(S): at 19:43

## 2023-01-01 RX ADMIN — Medication 1: at 12:20

## 2023-01-01 RX ADMIN — Medication 1 PATCH: at 17:06

## 2023-01-01 RX ADMIN — CARBAMIDE PEROXIDE 8 DROP(S): 81.86 SOLUTION/ DROPS AURICULAR (OTIC) at 17:30

## 2023-01-01 RX ADMIN — Medication 1 PATCH: at 07:39

## 2023-01-01 RX ADMIN — CARBAMIDE PEROXIDE 8 DROP(S): 81.86 SOLUTION/ DROPS AURICULAR (OTIC) at 05:05

## 2023-01-01 RX ADMIN — Medication 25 GRAM(S): at 09:31

## 2023-01-01 RX ADMIN — Medication 100 MILLIGRAM(S): at 22:25

## 2023-01-01 RX ADMIN — Medication 100 GRAM(S): at 10:58

## 2023-01-01 RX ADMIN — HYDROMORPHONE HYDROCHLORIDE 0.5 MILLIGRAM(S): 2 INJECTION INTRAMUSCULAR; INTRAVENOUS; SUBCUTANEOUS at 14:16

## 2023-01-01 RX ADMIN — Medication 1 SPRAY(S): at 05:05

## 2023-01-01 RX ADMIN — Medication 3 MILLILITER(S): at 03:29

## 2023-01-01 RX ADMIN — Medication 250 MILLIGRAM(S): at 09:20

## 2023-01-01 RX ADMIN — Medication 100 MILLIGRAM(S): at 05:28

## 2023-01-01 RX ADMIN — Medication 100 MILLIEQUIVALENT(S): at 13:40

## 2023-01-01 RX ADMIN — HYDROMORPHONE HYDROCHLORIDE 0.5 MILLIGRAM(S): 2 INJECTION INTRAMUSCULAR; INTRAVENOUS; SUBCUTANEOUS at 14:08

## 2023-01-01 RX ADMIN — CARBAMIDE PEROXIDE 8 DROP(S): 81.86 SOLUTION/ DROPS AURICULAR (OTIC) at 17:12

## 2023-01-01 RX ADMIN — Medication 3 MILLILITER(S): at 08:52

## 2023-01-01 RX ADMIN — Medication 100 MILLIGRAM(S): at 22:04

## 2023-01-01 RX ADMIN — ENOXAPARIN SODIUM 40 MILLIGRAM(S): 100 INJECTION SUBCUTANEOUS at 17:12

## 2023-01-01 RX ADMIN — CEFTRIAXONE 100 MILLIGRAM(S): 500 INJECTION, POWDER, FOR SOLUTION INTRAMUSCULAR; INTRAVENOUS at 03:45

## 2023-01-01 RX ADMIN — Medication 100 MILLIEQUIVALENT(S): at 12:26

## 2023-01-01 RX ADMIN — SODIUM CHLORIDE 4 MILLILITER(S): 9 INJECTION INTRAMUSCULAR; INTRAVENOUS; SUBCUTANEOUS at 08:52

## 2023-01-01 RX ADMIN — CARVEDILOL PHOSPHATE 3.12 MILLIGRAM(S): 80 CAPSULE, EXTENDED RELEASE ORAL at 06:14

## 2023-01-01 RX ADMIN — ATORVASTATIN CALCIUM 80 MILLIGRAM(S): 80 TABLET, FILM COATED ORAL at 22:52

## 2023-01-01 RX ADMIN — Medication 3 MILLILITER(S): at 22:21

## 2023-01-01 RX ADMIN — Medication 1 PATCH: at 19:24

## 2023-01-01 RX ADMIN — Medication 100 MILLIGRAM(S): at 13:17

## 2023-01-01 RX ADMIN — CARVEDILOL PHOSPHATE 3.12 MILLIGRAM(S): 80 CAPSULE, EXTENDED RELEASE ORAL at 05:20

## 2023-01-01 RX ADMIN — ENOXAPARIN SODIUM 40 MILLIGRAM(S): 100 INJECTION SUBCUTANEOUS at 17:30

## 2023-01-01 RX ADMIN — Medication 1 PATCH: at 18:12

## 2023-01-01 RX ADMIN — Medication 1 PATCH: at 19:58

## 2023-01-01 RX ADMIN — Medication 100 MILLIGRAM(S): at 13:10

## 2023-01-01 RX ADMIN — CEFTRIAXONE 100 MILLIGRAM(S): 500 INJECTION, POWDER, FOR SOLUTION INTRAMUSCULAR; INTRAVENOUS at 03:24

## 2023-01-01 RX ADMIN — HYDROMORPHONE HYDROCHLORIDE 0.2 MILLIGRAM(S): 2 INJECTION INTRAMUSCULAR; INTRAVENOUS; SUBCUTANEOUS at 11:35

## 2023-01-01 RX ADMIN — Medication 1 SPRAY(S): at 05:28

## 2023-01-01 RX ADMIN — Medication 1 PATCH: at 19:06

## 2023-01-01 RX ADMIN — Medication 1 PATCH: at 19:25

## 2023-01-01 RX ADMIN — CEFEPIME 100 MILLIGRAM(S): 1 INJECTION, POWDER, FOR SOLUTION INTRAMUSCULAR; INTRAVENOUS at 05:17

## 2023-01-01 RX ADMIN — Medication 100 MILLIGRAM(S): at 14:06

## 2023-01-01 RX ADMIN — Medication 1 SPRAY(S): at 05:41

## 2023-01-01 RX ADMIN — HYDROMORPHONE HYDROCHLORIDE 0.5 MILLIGRAM(S): 2 INJECTION INTRAMUSCULAR; INTRAVENOUS; SUBCUTANEOUS at 18:18

## 2023-01-01 RX ADMIN — Medication 100 MILLIEQUIVALENT(S): at 11:07

## 2023-01-01 RX ADMIN — HYDROMORPHONE HYDROCHLORIDE 0.2 MILLIGRAM(S): 2 INJECTION INTRAMUSCULAR; INTRAVENOUS; SUBCUTANEOUS at 17:55

## 2023-01-01 RX ADMIN — Medication 1 SPRAY(S): at 17:29

## 2023-01-01 RX ADMIN — HYDROMORPHONE HYDROCHLORIDE 0.5 MILLIGRAM(S): 2 INJECTION INTRAMUSCULAR; INTRAVENOUS; SUBCUTANEOUS at 13:08

## 2023-01-01 RX ADMIN — ATORVASTATIN CALCIUM 80 MILLIGRAM(S): 80 TABLET, FILM COATED ORAL at 21:35

## 2023-01-01 RX ADMIN — Medication 1 PATCH: at 18:00

## 2023-01-01 RX ADMIN — CARBAMIDE PEROXIDE 8 DROP(S): 81.86 SOLUTION/ DROPS AURICULAR (OTIC) at 17:14

## 2023-01-01 RX ADMIN — CARBAMIDE PEROXIDE 8 DROP(S): 81.86 SOLUTION/ DROPS AURICULAR (OTIC) at 06:11

## 2023-01-01 RX ADMIN — CEFTRIAXONE 100 MILLIGRAM(S): 500 INJECTION, POWDER, FOR SOLUTION INTRAMUSCULAR; INTRAVENOUS at 17:15

## 2023-01-01 RX ADMIN — CARVEDILOL PHOSPHATE 3.12 MILLIGRAM(S): 80 CAPSULE, EXTENDED RELEASE ORAL at 17:19

## 2023-01-01 RX ADMIN — CARBAMIDE PEROXIDE 8 DROP(S): 81.86 SOLUTION/ DROPS AURICULAR (OTIC) at 06:48

## 2023-01-01 RX ADMIN — Medication 250 MILLIGRAM(S): at 20:26

## 2023-01-01 RX ADMIN — Medication 250 MILLIGRAM(S): at 19:55

## 2023-01-01 RX ADMIN — HYDROMORPHONE HYDROCHLORIDE 0.2 MILLIGRAM(S): 2 INJECTION INTRAMUSCULAR; INTRAVENOUS; SUBCUTANEOUS at 16:39

## 2023-01-01 RX ADMIN — CARBAMIDE PEROXIDE 8 DROP(S): 81.86 SOLUTION/ DROPS AURICULAR (OTIC) at 17:07

## 2023-01-01 RX ADMIN — HYDROMORPHONE HYDROCHLORIDE 0.2 MILLIGRAM(S): 2 INJECTION INTRAMUSCULAR; INTRAVENOUS; SUBCUTANEOUS at 05:20

## 2023-01-01 RX ADMIN — CEFEPIME 100 MILLIGRAM(S): 1 INJECTION, POWDER, FOR SOLUTION INTRAMUSCULAR; INTRAVENOUS at 06:14

## 2023-01-01 RX ADMIN — Medication 3 MILLILITER(S): at 15:13

## 2023-01-01 RX ADMIN — BUDESONIDE AND FORMOTEROL FUMARATE DIHYDRATE 2 PUFF(S): 160; 4.5 AEROSOL RESPIRATORY (INHALATION) at 23:43

## 2023-01-01 RX ADMIN — HYDROMORPHONE HYDROCHLORIDE 0.2 MILLIGRAM(S): 2 INJECTION INTRAMUSCULAR; INTRAVENOUS; SUBCUTANEOUS at 23:31

## 2023-01-01 RX ADMIN — Medication 1 SPRAY(S): at 06:10

## 2023-01-01 RX ADMIN — Medication 1 SPRAY(S): at 18:13

## 2023-01-01 RX ADMIN — HYDROMORPHONE HYDROCHLORIDE 0.2 MILLIGRAM(S): 2 INJECTION INTRAMUSCULAR; INTRAVENOUS; SUBCUTANEOUS at 11:58

## 2023-03-01 NOTE — ED PROVIDER NOTE - CLINICAL SUMMARY MEDICAL DECISION MAKING FREE TEXT BOX
Pt with AMS going on for a few days, will do sepsis labs, CT head, troponin, reeeval. Pt with AMS going on for a few days, will do sepsis labs, CT head, troponin, reeval.    see progress notes for further MDM details

## 2023-03-01 NOTE — ED PROVIDER NOTE - WR ORDER STATUS 1
[facility]    PATIENT'S NAME: Alexis SALINAS   ATTENDING PHYSICIAN: Alice Domínguez M.D. OPERATING PHYSICIAN: Alice Domínguez M.D.    PATIENT ACCOUNT#:   [de-identified]    LOCATION:  PACU [address] [place] [address] PACU 2 EDWP 10  MEDICAL RECORD #:   [id number]       DATE OF BIRTH:  [birth year]
Performed

## 2023-03-01 NOTE — ED PROVIDER NOTE - PROGRESS NOTE DETAILS
hospitalist recommends transfer given diagnosis of mastoiditis and no ENT at . d/w transfer lline and ENT at [facility]. D/W daughter. agreeable with transfer. MD HA

## 2023-03-01 NOTE — ED PROVIDER NOTE - CARE PLAN
1 Principal Discharge DX:	AMS (altered mental status)  Secondary Diagnosis:	Mastoiditis  Secondary Diagnosis:	Otitis media
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
## 2023-03-01 NOTE — ED PROVIDER NOTE - NS ED ROS FT
Constitutional: No fever or chills  Eyes: No visual changes  HEENT: No throat pain  CV: No chest pain  Resp: No SOB no cough  GI: No abd pain, nausea or vomiting  : No dysuria  MSK: No musculoskeletal pain  Skin: No rash  Neuro: No headache. +AMS

## 2023-03-01 NOTE — ED ADULT NURSE NOTE - OBJECTIVE STATEMENT
Pt reports to ED with new onset altered mental status. Pt confused and combative drawing labs. Pt VSS on monitor. Daughter states that "he has not been himself lately".

## 2023-03-01 NOTE — ED ADULT NURSE NOTE - NS PRO PASSIVE SMOKE EXP
Problem: Falls - Risk of  Goal: *Absence of Falls  Description: Document Gloria Albarran Fall Risk and appropriate interventions in the flowsheet.   Outcome: Progressing Towards Goal  Note: Fall Risk Interventions:            Medication Interventions: Teach patient to arise slowly Unknown

## 2023-03-01 NOTE — ED ADULT TRIAGE NOTE - CHIEF COMPLAINT QUOTE
pt presents to ED brought in by ambulance from home due to Altered mental status since Sunday worsening

## 2023-03-02 NOTE — H&P ADULT - PROBLEM SELECTOR PLAN 4
- s/p ppm, currently on rate control with carvedilol   - as per last cardiology note no longer on ac
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
## 2023-03-02 NOTE — ED ADULT NURSE NOTE - INTERVENTIONS DEFINITIONS
Call bell, personal items and telephone within reach/Non-slip footwear when patient is off stretcher/Physically safe environment: no spills, clutter or unnecessary equipment/Stretcher in lowest position, wheels locked, appropriate side rails in place/Provide visual cue, wrist band, yellow gown, etc./Monitor gait and stability/Monitor for mental status changes and reorient to person, place, and time/Reinforce activity limits and safety measures with patient and family/Provide visual clues: red socks

## 2023-03-02 NOTE — H&P ADULT - PROBLEM SELECTOR PLAN 1
- currently AOx0, at baseline patient is AOx3   - most likely related to a metabolic cause currently treating an occult infection   - continue with ceftriaxone   - f/u blood and urine cultures   - will check B12, folate, TSH, and syphilis  - strict NPO until swallow eval completed  - RVP negative  - f/u CT chest non-con

## 2023-03-02 NOTE — ED ADULT NURSE NOTE - CHIEF COMPLAINT QUOTE
transfer from [facility] for ENT consult for bilateral Mastoiditis. Currently receiving 1g of vancomycin, 20g on left forearm. pmhx HTN, DMII, A.fib

## 2023-03-02 NOTE — ED PROVIDER NOTE - CARE PLAN
1 Principal Discharge DX:	UTI (urinary tract infection)  Secondary Diagnosis:	AMS (altered mental status)

## 2023-03-02 NOTE — ED ADULT NURSE NOTE - OBJECTIVE STATEMENT
Patient presents to ED as transfer from [facility] for ENT evaluation of Mastoiditis. Patient resting in stretcher, respirations even and unlabored, patient refusing assessment questions, states "leave me alone". Daughter at bedside states patient has been deteriorating for months. States she brought him to ED for AMS, difficulty walking (with frequent falls), poor appetite and doing normal ADLs. States at baseline he knows who she is and where he is however now doesn't. At baseline he ambulates with device. Daughter states patient lives with her and has been requiring more assistance and care at home for last few days worsening. Patient presents with IV line to left arm, wrapped, intact and patent. Patient admitted to hospital. Stretcher in lowest position, wheels locked, appropriate side rails in place.

## 2023-03-02 NOTE — H&P ADULT - ASSESSMENT
83y male with  hypertension, hyperlipidemia, previous stroke, CHF, CAD status post MI and stent, AF (no longer on AC), with PPM, COPD and active smoker who was transferred from [facility] with AMS for ENT evaluation of mastoiditis.

## 2023-03-02 NOTE — H&P ADULT - NSHPPHYSICALEXAM_GEN_ALL_CORE
General: NAD, well groomed  Head: atraumatic, normocephalic  Eyes: pupils, equal, round, reactive to light, EOMI, anicteric sclera   ENMT: moist mucous membranes   Neck: supple, no JVD, no tender lymphadenopathy   Lungs: clear lung fields bilaterally, no wheezes, no rales, or rhonchi   Heart: regular rate and rhythm, normal s1,s2, no murmurs, rubs, or gallops  Abdomen: soft, non-tender, non-distended, normoactive bowel sounds   Extremities: warm, well perfused, no lower extremity edema bilaterally   Neuro: AOx0, opens eyes to voice and tactile stimulation but does not respond to commands

## 2023-03-02 NOTE — H&P ADULT - PROBLEM SELECTOR PLAN 5
- asymptomatic, patient saturating well on room air and uses albuterol at home as needed  - will provide albuterol inhaler prn q6h

## 2023-03-02 NOTE — ED ADULT NURSE REASSESSMENT NOTE - NS ED NURSE REASSESS COMMENT FT1
Report given to floor RN Ewelina. Patient resting in stretcher, respirations even and unlabored, let staff members complete vital signs and FS. FS 88. Called ACP, spoke with Guille. Patient ok to go to floor after apple juice provided, dysphagia screening documented in chart. Daughter with patient and transporter.

## 2023-03-02 NOTE — ED PROVIDER NOTE - OBJECTIVE STATEMENT
83-year-old male with a history of hypertension, hyperlipidemia, previous stroke, CHF, CAD status post MI and stent, AF (no longer on AC), with PPM, COPD and active smoker transferred from [facility] for ENT evaluation of mastoiditis.  History mostly obtained from daughter at bedside who reports that patient was with 2 to 3 days of worsening mental status.  He has been acting more confused and unable to attend to his normal ADLs.  At baseline patient has been reported to be "decompensating" over the past several months and has been requiring increased assistance at home, but since Monday he has not been recognizing certain items (such as his cigarettes) and requiring assistance with feeding and going to the bathroom, which he never used to before.      Of note [facility] for which she was started on medication with resolution of swelling.  At the time patient was incidentally found to have a pneumonia and started on a course of oral antibiotics (daughter believes he completed the course).  At [facility], pt found to have e/o otitis vs mastoiditis on CT head; he received cefepime and transferred to [facility] for ENT evaluation.  No reported fevers, pain, n/v, fall or injuries.

## 2023-03-02 NOTE — ED ADULT TRIAGE NOTE - CHIEF COMPLAINT QUOTE
transfer from [facility] for ENT consult for bilateral Mastoiditis. Currently receiving 1g of vancomycin, 20g on left forearm. pmhx HTN, DMII, A.fib

## 2023-03-02 NOTE — H&P ADULT - NSHPLABSRESULTS_GEN_ALL_CORE
LABS:                          15.5   8.59  )-----------( 100      ( 02 Mar 2023 06:30 )             46.1     03-02    136  |  103  |  24<H>  ----------------------------<  104<H>  3.6   |  23  |  1.05    Ca    8.8      02 Mar 2023 06:30  Phos  2.4     03-02  Mg     1.70     03-02    TPro  6.4  /  Alb  3.5  /  TBili  1.4<H>  /  DBili  x   /  AST  16  /  ALT  10  /  AlkPhos  80  03-02    LIVER FUNCTIONS - ( 02 Mar 2023 06:30 )  Alb: 3.5 g/dL / Pro: 6.4 g/dL / ALK PHOS: 80 U/L / ALT: 10 U/L / AST: 16 U/L / GGT: x           PT/INR - ( 01 Mar 2023 18:54 )   PT: 13.3 sec;   INR: 1.14 ratio         PTT - ( 01 Mar 2023 18:54 )  PTT:31.1 sec  Urinalysis Basic - ( 01 Mar 2023 19:59 )    Color: Latoya / Appearance: Clear / S.025 / pH: x  Gluc: x / Ketone: Negative  / Bili: Small / Urobili: 4   Blood: x / Protein: 30 mg/dL / Nitrite: Negative   Leuk Esterase: Trace / RBC: 3-5 /HPF / WBC 3-5 /HPF   Sq Epi: x / Non Sq Epi: Few / Bacteria: Occasional

## 2023-03-02 NOTE — H&P ADULT - PROBLEM SELECTOR PLAN 2
- patient was transferred to [facility] for ENT evaluation of mastoiditis   - ENT stated low concern for acute otitis or mastoiditis at this time  - will continue with debrox drops 5-10 drops BID x 4 days and a saline nasal spray as per ENT recommendations

## 2023-03-02 NOTE — PATIENT PROFILE ADULT - NSPROIMPLANTSMEDDEV_GEN_A_NUR
left pacemaker - interrogation due this week as per daughter ; with stents/Automatic Implantable Cardioverter Defibrillator

## 2023-03-02 NOTE — PHYSICAL THERAPY INITIAL EVALUATION ADULT - ADDITIONAL COMMENTS
Pt confused and agitated at this time.  Unable to give PLOF due to AMS and agitation.  H&P reporting " Patient currently lives with daughter and can perform most of his ADLs independently prior to this sudden change in mental status.  Since 2/27, patient's daughter states that he could not ambulate, would not get and out of bed, and could not control his bladder, soiling himself multiple times.  Also has had difficulty ambulating.

## 2023-03-02 NOTE — PATIENT PROFILE ADULT - FALL HARM RISK - HARM RISK INTERVENTIONS
Assistance with ambulation/Assistance OOB with selected safe patient handling equipment/Communicate Risk of Fall with Harm to all staff/Discuss with provider need for PT consult/Monitor gait and stability/Reinforce activity limits and safety measures with patient and family/Tailored Fall Risk Interventions/Visual Cue: Yellow wristband and red socks/Bed in lowest position, wheels locked, appropriate side rails in place/Call bell, personal items and telephone in reach/Instruct patient to call for assistance before getting out of bed or chair/Non-slip footwear when patient is out of bed/[place] to call system/Physically safe environment - no spills, clutter or unnecessary equipment/Purposeful Proactive Rounding/Room/bathroom lighting operational, light cord in reach

## 2023-03-02 NOTE — PHYSICAL THERAPY INITIAL EVALUATION ADULT - PERTINENT HX OF CURRENT PROBLEM, REHAB EVAL
Patient currently lives with daughter and can perform most of his ADLs independently prior to this sudden change in mental status

## 2023-03-02 NOTE — ED PROVIDER NOTE - CLINICAL SUMMARY MEDICAL DECISION MAKING FREE TEXT BOX
84 y/o M with mult chronic medical issues here with worsening AMS and ?mastoiditis on CT head.  Pt without clinical e/o mastoiditis, rec'd cefepime prior to transfer.  ENT consulted, labs reviewed, noted BENIGNO, bacturia - ?uti.  Will req med admission.

## 2023-03-02 NOTE — H&P ADULT - PROBLEM SELECTOR PLAN 3
- stable, history of CABG in 2018  - will hold atorvastatin. lisinopril, and carvedilol until swallow eval completed

## 2023-03-02 NOTE — H&P ADULT - ATTENDING COMMENTS
83M w/ HTN, HLD, CAD s/p PCI, CVA, CHF, Afib off A/C, PPM, COPD and active smoker tranferred from [facility] for ENT evaluation of possible mastoiditis  -apprec ENT eval, no clinical signs of acute otitis or mastoiditis at this time, rec Debrox and saline nasal spray  -Acute AMS: r/o infectious etiology, UA equivocal, c/w CTX for now, f/u blood cultures and urine culture, check B12, folate, TSH, awaiting bedside dysphagia screen and S&S eval   Will obtain CT chest (h/o small loculated L pleural effusion, pleural plaques),  f/u CXR final read   May require MRI brain if infectious w/u unrevealing

## 2023-03-02 NOTE — H&P ADULT - NSHPSOCIALHISTORY_GEN_ALL_CORE
patient's daughter reports that he is an active smoker. Patient has been smoking approximately 1 PPD for 70 years. Patient does not drink alcohol or use illicit substances. Patient currently lives with daughter and can perform most of his ADLs independently prior to this sudden change in mental status

## 2023-03-02 NOTE — CONSULT NOTE ADULT - SUBJECTIVE AND OBJECTIVE BOX
HPI:  Patient is a 83y Male with  hypertension, hyperlipidemia, previous stroke, CHF, CAD status post MI and stent, AF (no longer on AC), with PPM, COPD and active smoker who p/w to OSH with AMS. Head CT demonstrated no acute intracranial process but detected middle ear and mastoid cavity fluid. Patient was transferred for further evaluation. Patient poor historian and uncooperative. History primarily obtained from daughter. Daughter states that patient has been altered for a bout a week now. He is forgetful, disoriented, confused, disinhibited, has slurred speech and lethargic. Unable to complete activities of daily living. Denies fevers. Denies ear pain or drainage, denies changes to appearance of ear, denies tugging or rubbing ear. Does have chronic hearing loss and wears hearing aids in both ears. Also has a history of ear wax requiring disimpaction on several occasions. No prior ear surgeries.    Physical Exam  T(C): 36.7 (03-02-23 @ 00:20), Max: 36.7 (03-01-23 @ 23:09)  HR: 64 (03-02-23 @ 00:20) (64 - 75)  BP: 134/59 (03-02-23 @ 00:20) (128/72 - 161/98)  RR: 16 (03-02-23 @ 00:20) (16 - 16)  SpO2: 98% (03-02-23 @ 00:20) (98% - 100%)  General: NAD, disheveled, disoriented  Resp: No respiratory distress, stridor, or stertor  Voice quality: normal  Face:  Symmetric without masses or lesions  OU: EOMI  AD: Pinna wnl, + impacted cerumen - did not tolerate disimpaction  AS: Pinna wnl, + impacted cerumen - did not tolerate disimpaction  Nose: dry mucosa  OC/OP: tongue normal, floor of mouth wnl, no masses or lesions, OP clear  Neck: soft/flat, no LAD  CNII-XII intact    IMAGING:    ACC: [id number] EXAM:  CT BRAIN   ORDERED BY: ÁNGELA CHRISTOPHER   PROCEDURE DATE:  03/01/2023    INTERPRETATION:  CT BRAIN WITHOUT CONTRAST  INDICATIONS:  Altered mental status  TECHNIQUE:  Serial axial images were obtained from the skull base to the   vertex without the use of contrast.  COMPARISON EXAM: Noncontrast CT brain dated 11/22/2022. Chest x-ray dated   3/1/2023.  FINDINGS:  Ventricles and sulci: Parenchymal volume loss is present which is   commensurate with patient age.  Intra-axial: Periventricular small vessel white matter ischemic changes   are appreciated. No intracranial mass, acute hemorrhage, or midline shift   is present. Old left cerebellar infarct. Old right cerebellar lacunar   infarct. Right frontal encephalomalacia and gliosis, may represent an old   right frontal infarct.  Extra-axial: No extra-axial fluid collection is identified. Deposition of   calcified plaques in association with the distal intracranial bilateral   vertebral arteries and bilateral carotid siphons.  Calvarium: Intact.  Bilateral optic globes are intact. Imaged paranasal sinuses are   predominantly clear. Fluid within bilateral mastoid air cells, bilateral   mastoid antra and bilateral middle ear cavities, not present on prior.   Correlate clinically for bilateral mastoiditis and/or otitis media.  IMPRESSION:  1. Evidence of nonacute supratentorial and infratentorial ischemic   events, as described in detail above.  2. No large arterial distribution acute infarct. No acute intercranial   hemorrhage.  3. Fluid within bilateral mastoid air cells, bilateral mastoid antra and   bilateral middle ear cavities, not present on prior. Correlate clinically   for bilateral mastoiditis and/or otitis media. Patient has a cardiac   pacemaker device. As such, follow-up MR imaging is likely contraindicated.  Findings were communicated by Dr. Behr Ventura to Dr. Ángela Christopher in   the emergency department at approximately 7:56 PM on 3/1/2023.  --- End of Report ---  DAWN BEHR-VENTURA MD; Attending Radiologist  This document has been electronically signed. Mar  1 2023  7:59PM    A/P: 83y Male hypertension, hyperlipidemia, previous stroke, CHF, CAD status post MI and stent, AF (no longer on AC), with PPM, COPD and active smoker who p/w to OSH with AMS with incidental finding of middle ear and mastoid fluid. Transferred for ENT evaluation to r/o otomastoiditis.     - Low concern for acute otitis or mastoiditis at this time based on clinical appearance but unable to fully examine middle ear for effusion. Review of CT head does not demonstrate significant middle ear fluid and mastoids appear only partially opacified. Patient does have impacted cerumen which could be causing acute worsening of hearing but unlikely to be major cause of his recent cognitive decline/delirium.  - Recommend debrox drops: 5-10 drops BID x 4 days. Apply to ear with ear tilted upward. Allow drops to sit for 5 minutes prior to draining ear in downward facing position.   - Can start nasal saline spray to help drain middle ear and mastoids  - Recommend outpatient follow up with ENT for hearing evaluation and ear cleaning. [facility] [place], [address] [place] [address], [place] [address], [phone]       --------------------------------------------------------------  Thank you for the consult,    Resident  Department of Otolaryngology - Head and Neck Surgery  Peds Page #[phone]  Adult Page #[phone]  ---------------------------------------------------------------

## 2023-03-03 NOTE — PROGRESS NOTE ADULT - SUBJECTIVE AND OBJECTIVE BOX
Patient is a 83y old  Male who presents with a chief complaint of AMS, r/o mastoiditis (02 Mar 2023 07:18)     INTERVAL HPI/OVERNIGHT EVENTS:  - No acute events overnight    SUBJECTIVE  - Patient seen and evaluated at bedside  - Patient reports presence of   - Patient reports absence of fevers, chills, HA, lightheadedness, dizziness, nausea, emesis, chest pain, dyspnea, palpitations, abd pain, diarrhea, urinary symptoms, skin color changes or rashes, or LE edema     MEDICATIONS  (STANDING):  carbamide peroxide Otic Solution 8 Drop(s) Both Ears two times a day  cefTRIAXone   IVPB 1000 milliGRAM(s) IV Intermittent every 24 hours  dextrose 5%. 1000 milliLiter(s) (50 mL/Hr) IV Continuous <Continuous>  dextrose 5%. 1000 milliLiter(s) (100 mL/Hr) IV Continuous <Continuous>  dextrose 50% Injectable 25 Gram(s) IV Push once  dextrose 50% Injectable 12.5 Gram(s) IV Push once  dextrose 50% Injectable 25 Gram(s) IV Push once  enoxaparin Injectable 40 milliGRAM(s) SubCutaneous every 24 hours  glucagon  Injectable 1 milliGRAM(s) IntraMuscular once  influenza  Vaccine (HIGH DOSE) 0.7 milliLiter(s) IntraMuscular once  insulin lispro (ADMELOG) corrective regimen sliding scale   SubCutaneous three times a day before meals  insulin lispro (ADMELOG) corrective regimen sliding scale   SubCutaneous at bedtime  nicotine -   7 mG/24Hr(s) Patch 1 Patch Transdermal every 24 hours  sodium chloride 0.65% Nasal 1 Spray(s) Both Nostrils two times a day    MEDICATIONS  (PRN):  acetaminophen     Tablet .. 650 milliGRAM(s) Oral every 6 hours PRN Temp greater or equal to 38C (100.4F), Mild Pain (1 - 3)  dextrose Oral Gel 15 Gram(s) Oral once PRN Blood Glucose LESS THAN 70 milliGRAM(s)/deciliter  melatonin 3 milliGRAM(s) Oral at bedtime PRN Insomnia        REVIEW OF SYSTEMS: As indicated above; otherwise, negative    VITAL SIGNS:  T(F): 97.3 (03-03-23 @ 05:04), Max: 98.7 (03-02-23 @ 09:25)  HR: 61 (03-03-23 @ 05:04) (60 - 62)  BP: 166/88 (03-03-23 @ 05:04) (130/68 - 166/88)  RR: 17 (03-03-23 @ 05:04) (17 - 18)  SpO2: 98% (03-03-23 @ 05:04) (98% - 100%)    PHYSICAL EXAM:  General: NAD, well groomed  Head: atraumatic, normocephalic  Eyes: pupils, equal, round, reactive to light, EOMI, anicteric sclera   ENMT: moist mucous membranes   Neck: supple, no JVD, no tender lymphadenopathy   Lungs: clear lung fields bilaterally, no wheezes, no rales, or rhonchi   Heart: regular rate and rhythm, normal s1,s2, no murmurs, rubs, or gallops  Abdomen: soft, non-tender, non-distended, normoactive bowel sounds   Extremities: warm, well perfused, no lower extremity edema bilaterally   Neuro: AOx0, opens eyes to voice and tactile stimulation but does not respond to commands    LABS:      Ca    8.8        02 Mar 2023 06:30      CAPILLARY BLOOD GLUCOSE      POCT Blood Glucose.: 96 mg/dL (02 Mar 2023 22:52)  POCT Blood Glucose.: 90 mg/dL (02 Mar 2023 17:56)  POCT Blood Glucose.: 90 mg/dL (02 Mar 2023 08:42)    BLOOD CULTURE  03-01 @ 19:37   No growth to date.  --  --  03-01 @ 18:15   No growth to date.  --  --    RADIOLOGY & ADDITIONAL TESTS:    Imaging Personally Reviewed:  [X ] YES     Consultant(s) Notes Reviewed:  Yes    Care Discussed with Consultants/Other Providers: Yes Patient is a 83y old  Male who presents with a chief complaint of AMS, r/o mastoiditis (02 Mar 2023 07:18)     INTERVAL HPI/OVERNIGHT EVENTS:  - No acute events overnight    SUBJECTIVE  - Patient seen and evaluated at bedside. Patient is more alert today, but still unable to verbalize how he is feeling. Therefore, unable to obtain ROS.    MEDICATIONS  (STANDING):  carbamide peroxide Otic Solution 8 Drop(s) Both Ears two times a day  cefTRIAXone   IVPB 1000 milliGRAM(s) IV Intermittent every 24 hours  dextrose 5%. 1000 milliLiter(s) (50 mL/Hr) IV Continuous <Continuous>  dextrose 5%. 1000 milliLiter(s) (100 mL/Hr) IV Continuous <Continuous>  dextrose 50% Injectable 25 Gram(s) IV Push once  dextrose 50% Injectable 12.5 Gram(s) IV Push once  dextrose 50% Injectable 25 Gram(s) IV Push once  enoxaparin Injectable 40 milliGRAM(s) SubCutaneous every 24 hours  glucagon  Injectable 1 milliGRAM(s) IntraMuscular once  influenza  Vaccine (HIGH DOSE) 0.7 milliLiter(s) IntraMuscular once  insulin lispro (ADMELOG) corrective regimen sliding scale   SubCutaneous three times a day before meals  insulin lispro (ADMELOG) corrective regimen sliding scale   SubCutaneous at bedtime  nicotine -   7 mG/24Hr(s) Patch 1 Patch Transdermal every 24 hours  sodium chloride 0.65% Nasal 1 Spray(s) Both Nostrils two times a day    MEDICATIONS  (PRN):  acetaminophen     Tablet .. 650 milliGRAM(s) Oral every 6 hours PRN Temp greater or equal to 38C (100.4F), Mild Pain (1 - 3)  dextrose Oral Gel 15 Gram(s) Oral once PRN Blood Glucose LESS THAN 70 milliGRAM(s)/deciliter  melatonin 3 milliGRAM(s) Oral at bedtime PRN Insomnia        REVIEW OF SYSTEMS: As indicated above; otherwise, negative    VITAL SIGNS:  T(F): 97.3 (03-03-23 @ 05:04), Max: 98.7 (03-02-23 @ 09:25)  HR: 61 (03-03-23 @ 05:04) (60 - 62)  BP: 166/88 (03-03-23 @ 05:04) (130/68 - 166/88)  RR: 17 (03-03-23 @ 05:04) (17 - 18)  SpO2: 98% (03-03-23 @ 05:04) (98% - 100%)    PHYSICAL EXAM:  General: NAD, well groomed  Eyes: pupils, equal, round, reactive to light, EOMI, anicteric sclera   ENMT: moist mucous membranes   Neck: supple, no JVD, no tender lymphadenopathy   Lungs: clear lung fields bilaterally, no wheezes, no rales, or rhonchi   Heart: regular rate and rhythm, normal s1,s2, no murmurs, rubs, or gallops  Abdomen: soft, non-tender, non-distended, normoactive bowel sounds   Extremities: warm, well perfused, no lower extremity edema bilaterally   Neuro: AOx2, opens eyes to voice and can respond to simple verbal commands     LABS:      Ca    8.8        02 Mar 2023 06:30      CAPILLARY BLOOD GLUCOSE      POCT Blood Glucose.: 96 mg/dL (02 Mar 2023 22:52)  POCT Blood Glucose.: 90 mg/dL (02 Mar 2023 17:56)  POCT Blood Glucose.: 90 mg/dL (02 Mar 2023 08:42)    BLOOD CULTURE  03-01 @ 19:37   No growth to date.  --  --  03-01 @ 18:15   No growth to date.  --  --    RADIOLOGY & ADDITIONAL TESTS:    Imaging Personally Reviewed:  [X ] YES     Consultant(s) Notes Reviewed:  Yes    Care Discussed with Consultants/Other Providers: Yes

## 2023-03-03 NOTE — DIETITIAN INITIAL EVALUATION ADULT - ORAL INTAKE PTA/DIET HISTORY
Patient seen for assessment. Unable to obtain information from patient 2/2 cognitive status. Information obtained from PCA and chart review.

## 2023-03-03 NOTE — DIETITIAN INITIAL EVALUATION ADULT - PERTINENT LABORATORY DATA
03-03    134<L>  |  101  |  20  ----------------------------<  69<L>  4.4   |  21<L>  |  1.00    Ca    9.1      03 Mar 2023 07:40  Phos  2.7     03-03  Mg     1.80     03-03    TPro  6.4  /  Alb  3.5  /  TBili  1.4<H>  /  DBili  x   /  AST  16  /  ALT  10  /  AlkPhos  80  03-02  POCT Blood Glucose.: 89 mg/dL (03-03-23 @ 12:37)  A1C with Estimated Average Glucose Result: 6.1 % (03-03-23 @ 07:40)  A1C with Estimated Average Glucose Result: 5.8 % (11-23-22 @ 08:07)

## 2023-03-03 NOTE — DIETITIAN INITIAL EVALUATION ADULT - PERTINENT MEDS FT
MEDICATIONS  (STANDING):  azithromycin  IVPB 500 milliGRAM(s) IV Intermittent every 24 hours  carbamide peroxide Otic Solution 8 Drop(s) Both Ears two times a day  cefepime   IVPB 1000 milliGRAM(s) IV Intermittent daily  dextrose 5%. 1000 milliLiter(s) (50 mL/Hr) IV Continuous <Continuous>  dextrose 5%. 1000 milliLiter(s) (100 mL/Hr) IV Continuous <Continuous>  dextrose 50% Injectable 25 Gram(s) IV Push once  dextrose 50% Injectable 12.5 Gram(s) IV Push once  dextrose 50% Injectable 25 Gram(s) IV Push once  enoxaparin Injectable 40 milliGRAM(s) SubCutaneous every 24 hours  glucagon  Injectable 1 milliGRAM(s) IntraMuscular once  influenza  Vaccine (HIGH DOSE) 0.7 milliLiter(s) IntraMuscular once  insulin lispro (ADMELOG) corrective regimen sliding scale   SubCutaneous three times a day before meals  insulin lispro (ADMELOG) corrective regimen sliding scale   SubCutaneous at bedtime  nicotine -   7 mG/24Hr(s) Patch 1 Patch Transdermal every 24 hours  sodium chloride 0.65% Nasal 1 Spray(s) Both Nostrils two times a day  vancomycin  IVPB 1000 milliGRAM(s) IV Intermittent every 12 hours    MEDICATIONS  (PRN):  acetaminophen     Tablet .. 650 milliGRAM(s) Oral every 6 hours PRN Temp greater or equal to 38C (100.4F), Mild Pain (1 - 3)  dextrose Oral Gel 15 Gram(s) Oral once PRN Blood Glucose LESS THAN 70 milliGRAM(s)/deciliter  melatonin 3 milliGRAM(s) Oral at bedtime PRN Insomnia

## 2023-03-03 NOTE — PROGRESS NOTE ADULT - PROBLEM SELECTOR PLAN 3
- stable, history of CABG in 2018  - will hold atorvastatin. lisinopril, and carvedilol until swallow eval completed - stable, history of CABG in 2018  - will hold atorvastatin. lisinopril, and carvedilol until formal swallow eval completed

## 2023-03-03 NOTE — PROGRESS NOTE ADULT - PROBLEM SELECTOR PLAN 1
- currently AOx0, at baseline patient is AOx3   - most likely related to a metabolic cause currently treating an occult infection   - continue with ceftriaxone   - f/u blood and urine cultures   - will check B12, folate, TSH, and syphilis  - strict NPO until swallow eval completed  - RVP negative  - f/u CT chest non-con - currently AOx2, at baseline patient is AOx3   - most likely related to a metabolic cause currently treating an occult infection   - continue with ceftriaxone   - f/u blood and urine cultures   - f/u B12, folate, and syphilis\  - TSH wnl   - passed bedside swallow eval, but pending formal speech and swallow eval as daughter had concerns about his eating ability at home   - RVP negative  - f/u CT chest non-con - currently AOx2, at baseline patient is AOx3   - most likely related to a metabolic cause currently treating an occult infection   - continue with ceftriaxone   - blood cultures from 3/1 show NGTD, urine cultures negative   - f/u B12, folate, and syphilis   - TSH wnl   - passed bedside swallow eval, but pending formal speech and swallow eval as daughter had concerns about his eating ability at home   - RVP negative  - f/u CT chest non-con

## 2023-03-03 NOTE — PROGRESS NOTE ADULT - ASSESSMENT
83y male with  hypertension, hyperlipidemia, previous stroke, CHF, CAD status post MI and stent, AF (no longer on AC), with PPM, COPD and active smoker who was transferred from [facility] with AMS for ENT evaluation of mastoiditis.

## 2023-03-03 NOTE — DIETITIAN INITIAL EVALUATION ADULT - OTHER INFO
83 year old male with a PMH of hypertension, hyperlipidemia, previous stroke, CHF, COPD who was transferred from [facility] with AMS for ENT evaluation of mastoiditis per chart.    Patient currently pending swallow evaluation per chart. PCA reports patient did well with breakfast. No GI distress reported. Has no food allergies per chart. Unable to obtain UBW at this time. ABW is 147.7 lbs. (3/2) per chart. No edema or pressure injuries per RN flow sheet.

## 2023-03-03 NOTE — PROGRESS NOTE ADULT - PROBLEM SELECTOR PLAN 2
- patient was transferred to [facility] for ENT evaluation of mastoiditis   - ENT stated low concern for acute otitis or mastoiditis at this time  - will continue with debrox drops 5-10 drops BID x 4 days and a saline nasal spray as per ENT recommendations

## 2023-03-03 NOTE — DIETITIAN INITIAL EVALUATION ADULT - ADD RECOMMEND
Continue w/ consistent carbohydrate, defer consistencies to team. Multivitamin for micronutrient repletion. Document PO intake to monitor trend.

## 2023-03-03 NOTE — PROGRESS NOTE ADULT - PROBLEM SELECTOR PLAN 6
Diet: strict NPO, pending swallow eval  DVT ppx: lovenox   Dispo: medically active Diet: strict pureed diet, will advance following formal swallow eval   DVT ppx: lovenox   Dispo: medically active Diet: pureed diet, will advance following formal swallow eval   DVT ppx: lovenox   Dispo: medically active, eventually ANDRÉS

## 2023-03-04 NOTE — CONSULT NOTE ADULT - ASSESSMENT
WORK UP      ANTIBIOTIC      DIAGNOSIS and IMPRESSION        RECOMMENDATIONS        PT TO BE SEEN. PRELIM NOTE  PENDING RECS. PLEASE WAIT FOR FINAL RECS AFTER DISCUSSION WITH ATTENDING#    Xavi Muñiz DO, PGY-4   ID fellow  [employer] Preferred  After 5pm/weekends call [phone]   83M with hypertension, hyperlipidemia, previous stroke, CHF, CAD status post MI and stent, AF (no longer on AC), with PPM, COPD and active smoker who was transferred from [facility] with AMS and ENT evaluation of mastoiditis, low concern for acute otitis or mastoiditis per ENT, unclear etiology of AMS. ID consulted for bacteremia.     Patient is AAOx2, poor historian, limited history  Reports feeling "good" without any acute complaints  Denies cough, abdominal pain, sputum production, dyspnea, n, v, diarrhea, dysuria  Has known PPM    WORK UP  No leukocyotis  Cr 0.9  UA negative  UCx normal [place]  CTH negative  CXR clear  CT Chest with patchy bilateral UL opacity  MRSA negative  RVP negative  BCx (3/1) 1 out of 4 GPC, PCR negative    ANTIBIOTIC  azithromycin  IVPB 500 every 24 hours  cefepime   IVPB 1000 daily  vancomycin  IVPB 1000 every 12 hours    DIAGNOSIS and IMPRESSION  #Altered Mental Status  #1 out of 4 GPC in Blood, PCR negative  #Abnormal CT Chest    - unclear etiology of AMS  - unclear if GPC is true pathogen vs contaminant  - denies respiratory symptoms, remains afebrile without leukocytosis     RECOMMENDATIONS  - d/c empiric antibiotic, unclear indication for treatment  - monitor fever  - trend WBC  - obtain Blood Culture x2 STAT  - noninfectious work up per primary team      PT TO BE SEEN. PRELIM NOTE  PENDING RECS. PLEASE WAIT FOR FINAL RECS AFTER DISCUSSION WITH ATTENDINGJesusita Muñiz DO, PGY-4   ID fellow  [employer] Preferred  After 5pm/weekends call [phone]   83M with hypertension, hyperlipidemia, previous stroke, CHF, CAD status post MI and stent, AF (no longer on AC), with PPM, COPD and active smoker who was transferred from [facility] with AMS with CTH showing fluid in middle ear and mastoid cavity, ENT evaluation stating low concern for acute otitis or mastoiditis, unclear etiology of AMS, course complicated by BCx GPC in 1 out of 4 bottles, PCR negative. ID consulted for bacteremia.     Patient is AAOx2, poor historian, limited history  Reports feeling "good" without any acute complaints  Denies cough, abdominal pain, sputum production, dyspnea, n, v, diarrhea, dysuria  Has known PPM    WORK UP  No leukocyotis  Cr 0.9  UA negative  UCx normal [place]  CTH negative  CXR clear  CT Chest with patchy bilateral UL opacity  MRSA negative  RVP negative  BCx (3/1) 1 out of 4 GPC, PCR negative    ANTIBIOTIC  azithromycin  IVPB 500 every 24 hours  cefepime   IVPB 1000 daily  vancomycin  IVPB 1000 every 12 hours    DIAGNOSIS and IMPRESSION  #Altered Mental Status  #1 out of 4 GPC in Blood, PCR negative  #Abnormal CT Chest    - unclear etiology of AMS  - unclear if GPC is true pathogen vs contaminant  - denies respiratory symptoms, remains afebrile without leukocytosis     RECOMMENDATIONS  - d/c empiric antibiotic, unclear indication for treatment  - monitor fever  - trend WBC  - obtain Blood Culture x2 STAT  - noninfectious work up per primary team      PT TO BE SEEN. PRELIM NOTE  PENDING RECS. PLEASE WAIT FOR FINAL RECS AFTER DISCUSSION WITH ATTENDING#    Xavi Muñiz DO, PGY-4   ID fellow  [employer] Teams Preferred  After 5pm/weekends call [phone]   83M with hypertension, hyperlipidemia, previous stroke, CHF, CAD status post MI and stent, AF (no longer on AC), with PPM, COPD and active smoker who was transferred from [facility] with AMS with CTH showing fluid in middle ear and mastoid cavity, ENT evaluation stating low concern for acute otitis or mastoiditis, unclear etiology of AMS, course complicated by BCx GPC in 1 out of 4 bottles, PCR negative. ID consulted for bacteremia.     Patient is AAOx2, poor historian, limited history  Reports feeling "good" without any acute complaints  Denies cough, abdominal pain, sputum production, dyspnea, n, v, diarrhea, dysuria  Has known PPM    WORK UP  No leukocyotis  Cr 0.9  UA negative  UCx normal [place]  CTH negative  CXR clear  CT Chest with patchy bilateral UL opacity  MRSA negative  RVP negative  BCx (3/1) 1 out of 4 GPC, PCR negative    ANTIBIOTIC  azithromycin  IVPB 500 every 24 hours  cefepime   IVPB 1000 daily  vancomycin  IVPB 1000 every 12 hours    DIAGNOSIS and IMPRESSION  #Altered Mental Status  #1 out of 4 GPC in Blood, PCR negative  #Abnormal CT Chest    - unclear etiology of AMS  - unclear if GPC is true pathogen vs contaminant  - denies respiratory symptoms, remains afebrile without leukocytosis     RECOMMENDATIONS  - c/w empiric vanco and cefepime for now pending repeat cultures  - d/c azithro  - monitor fever  - trend WBC  - obtain Blood Culture x2 STAT  - noninfectious work up per primary team    Case d/w attending and primary team      Xavi Muñiz DO, PGY-4   ID fellow  [employer] Preferred  After 5pm/weekends call [phone]

## 2023-03-04 NOTE — PROGRESS NOTE ADULT - PROBLEM SELECTOR PLAN 7
Diet: pureed diet, will advance following formal swallow eval   DVT ppx: lovenox   Dispo: medically active, eventually ANDRÉS Diet: pureed diet, speech recommends cineesophagogram   DVT ppx: lovenox   Dispo: medically active, eventually ANDRÉS

## 2023-03-04 NOTE — PROGRESS NOTE ADULT - PROBLEM SELECTOR PLAN 5
- asymptomatic, patient saturating well on room air and uses albuterol at home as needed  - will provide albuterol inhaler prn q6h - s/p ppm, currently on rate control with carvedilol   - as per last cardiology note no longer on ac

## 2023-03-04 NOTE — PROGRESS NOTE ADULT - ASSESSMENT
83y male with  hypertension, hyperlipidemia, previous stroke, CHF, CAD status post MI and stent, AF (no longer on AC), with PPM, COPD and active smoker who was transferred from [facility] with AMS for ENT evaluation of mastoiditis.

## 2023-03-04 NOTE — PROGRESS NOTE ADULT - SUBJECTIVE AND OBJECTIVE BOX
Patient is a 83y old  Male who presents with a chief complaint of Urinary tract infection     (03 Mar 2023 16:02)     INTERVAL HPI/OVERNIGHT EVENTS:  - No acute events overnight    SUBJECTIVE  - Patient seen and evaluated at bedside  - Patient reports presence of   - Patient reports absence of fevers, chills, HA, lightheadedness, dizziness, nausea, emesis, chest pain, dyspnea, palpitations, abd pain, diarrhea, urinary symptoms, skin color changes or rashes, or LE edema     MEDICATIONS  (STANDING):  azithromycin  IVPB 500 milliGRAM(s) IV Intermittent every 24 hours  carbamide peroxide Otic Solution 8 Drop(s) Both Ears two times a day  cefepime   IVPB 1000 milliGRAM(s) IV Intermittent daily  dextrose 5%. 1000 milliLiter(s) (50 mL/Hr) IV Continuous <Continuous>  dextrose 5%. 1000 milliLiter(s) (100 mL/Hr) IV Continuous <Continuous>  dextrose 50% Injectable 25 Gram(s) IV Push once  dextrose 50% Injectable 12.5 Gram(s) IV Push once  dextrose 50% Injectable 25 Gram(s) IV Push once  enoxaparin Injectable 40 milliGRAM(s) SubCutaneous every 24 hours  glucagon  Injectable 1 milliGRAM(s) IntraMuscular once  influenza  Vaccine (HIGH DOSE) 0.7 milliLiter(s) IntraMuscular once  insulin lispro (ADMELOG) corrective regimen sliding scale   SubCutaneous three times a day before meals  insulin lispro (ADMELOG) corrective regimen sliding scale   SubCutaneous at bedtime  nicotine -   7 mG/24Hr(s) Patch 1 Patch Transdermal every 24 hours  sodium chloride 0.65% Nasal 1 Spray(s) Both Nostrils two times a day  vancomycin  IVPB 1000 milliGRAM(s) IV Intermittent every 12 hours    MEDICATIONS  (PRN):  acetaminophen     Tablet .. 650 milliGRAM(s) Oral every 6 hours PRN Temp greater or equal to 38C (100.4F), Mild Pain (1 - 3)  dextrose Oral Gel 15 Gram(s) Oral once PRN Blood Glucose LESS THAN 70 milliGRAM(s)/deciliter  melatonin 3 milliGRAM(s) Oral at bedtime PRN Insomnia        REVIEW OF SYSTEMS: As indicated above; otherwise, negative    VITAL SIGNS:  T(F): 97.3 (03-04-23 @ 05:00), Max: 97.5 (03-03-23 @ 12:20)  HR: 70 (03-04-23 @ 05:00) (66 - 80)  BP: 154/99 (03-04-23 @ 05:00) (150/75 - 155/91)  RR: 17 (03-04-23 @ 05:00) (17 - 18)  SpO2: 100% (03-04-23 @ 05:00) (99% - 100%)    PHYSICAL EXAM:  General: NAD, well-groomed, well-developed  Eyes: Conjunctiva and sclera clear  ENMT: Moist mucous membranes  Neck: No palpable pre-auricular, post-auricular, occipital, mandibular, submental, supra-clavicular, or infra-clavicular lymph nodes   Chest: Clear to auscultation bilaterally; no rales, rhonchi, or wheezing  Heart: Regular rate and rhythm; normal S1 and S2; no murmurs, rubs, or gallops  Abd: Soft, nontender, nondistended  Nervous System: AAOX3  Psych: Appropriate affect  Ext: no peripheral LE edema bilaterally    LABS:                        18.3   10.34 )-----------( 112      ( 04 Mar 2023 05:34 )             54.1     04 Mar 2023 05:34    132    |  96     |  16     ----------------------------<  105    4.1     |  26     |  0.95     Ca    9.6        04 Mar 2023 05:34  Phos  2.6       04 Mar 2023 05:34  Mg     1.80      04 Mar 2023 05:34      CAPILLARY BLOOD GLUCOSE      POCT Blood Glucose.: 191 mg/dL (03 Mar 2023 21:33)  POCT Blood Glucose.: 102 mg/dL (03 Mar 2023 17:31)  POCT Blood Glucose.: 89 mg/dL (03 Mar 2023 12:37)  POCT Blood Glucose.: 75 mg/dL (03 Mar 2023 08:36)    BLOOD CULTURE  03-01 @ 19:59   <10,000 CFU/mL Normal Urogenital Halie  --  --  03-01 @ 19:37   No growth to date.  --  --  03-01 @ 18:15   Growth in aerobic bottle: Gram positive cocci in pairs  ***Blood Panel PCR results on this specimen are available  approximately 3 hours after the Gram stain result.***  Gram stain, PCR, and/or culture results may not always  correspond due to difference in methodologies.  ************************************************************  This PCR assay was performed by multiplex PCR. This  Assay tests for 66 bacterial and resistance gene targets.  Please refer to the [facility] test directory  at [email or web address] for details.  --  Blood Culture PCR    RADIOLOGY & ADDITIONAL TESTS:    Imaging Personally Reviewed:  [X ] YES     Consultant(s) Notes Reviewed:  Yes    Care Discussed with Consultants/Other Providers: Yes Patient is a 83y old  Male who presents with a chief complaint of Urinary tract infection     (03 Mar 2023 16:02)     INTERVAL HPI/OVERNIGHT EVENTS:  - No acute events overnight    SUBJECTIVE  - Patient seen and evaluated at bedside. Patient states he is feeling well and is not in any pain or discomfort. Patient denies any fever, chills, chest pain, shortness of breath, nausea, vomiting, or abdominal pain     MEDICATIONS  (STANDING):  azithromycin  IVPB 500 milliGRAM(s) IV Intermittent every 24 hours  carbamide peroxide Otic Solution 8 Drop(s) Both Ears two times a day  cefepime   IVPB 1000 milliGRAM(s) IV Intermittent daily  dextrose 5%. 1000 milliLiter(s) (50 mL/Hr) IV Continuous <Continuous>  dextrose 5%. 1000 milliLiter(s) (100 mL/Hr) IV Continuous <Continuous>  dextrose 50% Injectable 25 Gram(s) IV Push once  dextrose 50% Injectable 12.5 Gram(s) IV Push once  dextrose 50% Injectable 25 Gram(s) IV Push once  enoxaparin Injectable 40 milliGRAM(s) SubCutaneous every 24 hours  glucagon  Injectable 1 milliGRAM(s) IntraMuscular once  influenza  Vaccine (HIGH DOSE) 0.7 milliLiter(s) IntraMuscular once  insulin lispro (ADMELOG) corrective regimen sliding scale   SubCutaneous three times a day before meals  insulin lispro (ADMELOG) corrective regimen sliding scale   SubCutaneous at bedtime  nicotine -   7 mG/24Hr(s) Patch 1 Patch Transdermal every 24 hours  sodium chloride 0.65% Nasal 1 Spray(s) Both Nostrils two times a day  vancomycin  IVPB 1000 milliGRAM(s) IV Intermittent every 12 hours    MEDICATIONS  (PRN):  acetaminophen     Tablet .. 650 milliGRAM(s) Oral every 6 hours PRN Temp greater or equal to 38C (100.4F), Mild Pain (1 - 3)  dextrose Oral Gel 15 Gram(s) Oral once PRN Blood Glucose LESS THAN 70 milliGRAM(s)/deciliter  melatonin 3 milliGRAM(s) Oral at bedtime PRN Insomnia        REVIEW OF SYSTEMS: As indicated above; otherwise, negative    VITAL SIGNS:  T(F): 97.3 (03-04-23 @ 05:00), Max: 97.5 (03-03-23 @ 12:20)  HR: 70 (03-04-23 @ 05:00) (66 - 80)  BP: 154/99 (03-04-23 @ 05:00) (150/75 - 155/91)  RR: 17 (03-04-23 @ 05:00) (17 - 18)  SpO2: 100% (03-04-23 @ 05:00) (99% - 100%)    PHYSICAL EXAM:  General: NAD, well-groomed, well-developed  Eyes: Conjunctiva and sclera clear  ENMT: Moist mucous membranes  Neck: No palpable pre-auricular, post-auricular, occipital, mandibular, submental, supra-clavicular, or infra-clavicular lymph nodes   Chest: Clear to auscultation bilaterally; no rales, rhonchi, or wheezing  Heart: Regular rate and rhythm; normal S1 and S2; no murmurs, rubs, or gallops  Abd: Soft, nontender, nondistended  Nervous System: AAOX3  Psych: Appropriate affect  Ext: no peripheral LE edema bilaterally    LABS:                        18.3   10.34 )-----------( 112      ( 04 Mar 2023 05:34 )             54.1     04 Mar 2023 05:34    132    |  96     |  16     ----------------------------<  105    4.1     |  26     |  0.95     Ca    9.6        04 Mar 2023 05:34  Phos  2.6       04 Mar 2023 05:34  Mg     1.80      04 Mar 2023 05:34      CAPILLARY BLOOD GLUCOSE      POCT Blood Glucose.: 191 mg/dL (03 Mar 2023 21:33)  POCT Blood Glucose.: 102 mg/dL (03 Mar 2023 17:31)  POCT Blood Glucose.: 89 mg/dL (03 Mar 2023 12:37)  POCT Blood Glucose.: 75 mg/dL (03 Mar 2023 08:36)    BLOOD CULTURE  03-01 @ 19:59   <10,000 CFU/mL Normal Urogenital Halie  --  --  03-01 @ 19:37   No growth to date.  --  --  03-01 @ 18:15   Growth in aerobic bottle: Gram positive cocci in pairs  ***Blood Panel PCR results on this specimen are available  approximately 3 hours after the Gram stain result.***  Gram stain, PCR, and/or culture results may not always  correspond due to difference in methodologies.  ************************************************************  This PCR assay was performed by multiplex PCR. This  Assay tests for 66 bacterial and resistance gene targets.  Please refer to the [facility] Labs test directory  at [email or web address] for details.  --  Blood Culture PCR    RADIOLOGY & ADDITIONAL TESTS:    Imaging Personally Reviewed:  [X ] YES     Consultant(s) Notes Reviewed:  Yes    Care Discussed with Consultants/Other Providers: Yes

## 2023-03-04 NOTE — CONSULT NOTE ADULT - SUBJECTIVE AND OBJECTIVE BOX
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  Final Report (03 Mar 2023 09:50):    <10,000 CFU/mL Normal Urogenital [place]    Culture - Blood (collected 01 Mar 2023 19:37)  Source: .Blood None  Preliminary Report (03 Mar 2023 05:01):    No growth to date.    Culture - Blood (collected 01 Mar 2023 18:15)  Source: .Blood Blood-Peripheral  Gram Stain (03 Mar 2023 17:55):    Growth in aerobic bottle: Gram positive cocci in pairs  Preliminary Report (03 Mar 2023 17:55):    Growth in aerobic bottle: Gram positive cocci in pairs    ***Blood Panel PCR results on this specimen are available    approximately 3 hours after the Gram stain result.***    Gram stain, PCR, and/or culture results may not always    correspond due to difference in methodologies.    ************************************************************    This PCR assay was performed by multiplex PCR. This    Assay tests for 66 bacterial and resistance gene targets.    Please refer to the [facility] test directory    at [email or web address] for details.  Organism: Blood Culture PCR (03 Mar 2023 20:12)  Organism: Blood Culture PCR (03 Mar 2023 20:12)      -  Blood PCR Panel: NEG      Method Type: PCR    Rapid RVP Result: NotDetec (03-01 @ 18:54)      RADIOLOGY:  imaging below personally reviewed and agree with findings  < from: CT Chest No Cont (03.03.23 @ 11:51) >  IMPRESSION:  Clustered and patchy bilateral upper lobe opacities, greater on the left   may be infectious or inflammatory.    < end of copied text >  < from: Xray Chest 1 View- PORTABLE-Urgent (03.01.23 @ 21:13) >  No acute pulmonary disease.    < end of copied text >  < from: CT Head No Cont (03.01.23 @ 19:33) >  IMPRESSION:  1. Evidence of nonacute supratentorial and infratentorial ischemic   events, as described in detail above.  2. No large arterial distribution acute infarct. Noacute intercranial   hemorrhage.  3. Fluid within bilateral mastoid air cells, bilateral mastoid antra and   bilateral middle ear cavities, not present on prior. Correlate clinically   for bilateral mastoiditis and/or otitis media. Patient has a cardiac   pacemaker device. As such, follow-up MR imaging is likely contraindicated.    Findings were communicated by Dr. Behr Ventura to Dr. Ángela Almaguer in   the emergency department at approximately 7:56 PM on 3/1/2023.    < end of copied text >     Patient is a 83y old  Male who presents with a chief complaint of AMS, r/o mastoiditis (04 Mar 2023 07:16)    HPI:  83M with hypertension, hyperlipidemia, previous stroke, CHF, CAD status post MI and stent, AF (no longer on AC), with PPM, COPD and active smoker who was transferred from [facility] with AMS with CTH showing fluid in middle ear and mastoid cavity, ENT evaluation stating low concern for acute otitis or mastoiditis, unclear etiology of AMS, course complicated by BCx GPC in 1 out of 4 bottles, PCR negative. ID consulted for bacteremia.     Patient is AAOx2, poor historian, limited history  Reports feeling "good" without any acute complaints  Denies cough, abdominal pain, sputum production, dyspnea, n, v, diarrhea, dysuria  Has known PPM    No leukocyotis  Cr 0.9  UA negative  UCx normal [place]  CTH negative  CXR clear  CT Chest with patchy bilateral UL opacity  MRSA negative  RVP negative  BCx (3/1) 1 out of 4 GPC, PCR negative    PAST MEDICAL & SURGICAL HISTORY:  Cardiac arrhythmia      COPD (chronic obstructive pulmonary disease)      Pacemaker      Hyperlipidemia      Bronchitis      Diabetes      Atrial fibrillation      Asthma      MI, old      Stented coronary artery      CHF (congestive heart failure)      Stroke      Hypertension      Cardiac pacemaker      H/O hernia repair          Allergies  penicillin (Anaphylaxis)    ANTIMICROBIALS (past 90 days)  MEDICATIONS  (STANDING):    azithromycin  IVPB   255 mL/Hr IV Intermittent (03-03-23 @ 17:58)    cefTRIAXone   IVPB   100 mL/Hr IV Intermittent (03-02-23 @ 03:45)    cefTRIAXone   IVPB   100 mL/Hr IV Intermittent (03-03-23 @ 03:24)    vancomycin  IVPB   250 mL/Hr IV Intermittent (03-04-23 @ 09:18)   250 mL/Hr IV Intermittent (03-03-23 @ 20:13)        azithromycin  IVPB 500 every 24 hours  cefepime   IVPB 1000 daily  vancomycin  IVPB 1000 every 12 hours    MEDICATIONS  (STANDING):  acetaminophen     Tablet .. 650 every 6 hours PRN  carvedilol 3.125 every 12 hours  dextrose 50% Injectable 25 once  dextrose 50% Injectable 12.5 once  dextrose 50% Injectable 25 once  dextrose Oral Gel 15 once PRN  enoxaparin Injectable 40 every 24 hours  glucagon  Injectable 1 once  influenza  Vaccine (HIGH DOSE) 0.7 once  insulin lispro (ADMELOG) corrective regimen sliding scale  three times a day before meals  insulin lispro (ADMELOG) corrective regimen sliding scale  at bedtime  melatonin 3 at bedtime PRN    SOCIAL HISTORY:    unable to full obtain due to mental status      FAMILY HISTORY: unable to full obtain due to mental status    REVIEW OF SYSTEMS  [  ] ROS unobtainable because:    [x  ] All other systems negative except as noted below:	    Constitutional:  [ ] fever [ ] chills  [ ] weight loss  [ ] weakness  Skin:  [ ] rash [ ] phlebitis	  Eyes: [ ] icterus [ ] pain  [ ] discharge	  ENMT: [ ] sore throat  [ ] thrush [ ] ulcers [ ] exudates  Respiratory: [ ] dyspnea [ ] hemoptysis [ ] cough [ ] sputum	  Cardiovascular:  [ ] chest pain [ ] palpitations [ ] edema	  Gastrointestinal:  [ ] nausea [ ] vomiting [ ] diarrhea [ ] constipation [ ] pain	  Genitourinary:  [ ] dysuria [ ] frequency [ ] hematuria [ ] discharge [ ] flank pain  [ ] incontinence  Musculoskeletal:  [ ] myalgias [ ] arthralgias [ ] arthritis  [ ] back pain  Neurological:  [ ] headache [ ] seizures  [ ] confusion/altered mental status  Psychiatric:  [ ] anxiety [ ] depression	  Hematology/Lymphatics:  [ ] lymphadenopathy  Endocrine:  [ ] adrenal [ ] thyroid  Allergic/Immunologic:	 [ ] transplant [ ] seasonal    Vital Signs Last 24 Hrs  T(F): 97.3 (03-04-23 @ 05:00), Max: 98.7 (03-02-23 @ 09:25)  Vital Signs Last 24 Hrs  HR: 70 (03-04-23 @ 05:00) (66 - 80)  BP: 154/99 (03-04-23 @ 05:00) (150/75 - 155/91)  RR: 17 (03-04-23 @ 05:00)  SpO2: 100% (03-04-23 @ 05:00) (99% - 100%)  Wt(kg): --    Physical Exam:  Constitutional:  well preserved, comfortable  Head/Eyes: no icterus  ENT:  supple, no cervical lymphadenopathy   LUNGS:  CTA  CVS:  regular rhythm, no murmur  Abd:  soft, non-tender; non-distended  Ext:  b/l skin abrasions in UE  Vascular:  IV site no erythema tenderness or discharge  Neuro: AAO X 2, non- focal                            18.3   10.34 )-----------( 112      ( 04 Mar 2023 05:34 )             54.1   03-04    132<L>  |  96<L>  |  16  ----------------------------<  105<H>  4.1   |  26  |  0.95    Ca    9.6      04 Mar 2023 05:34  Phos  2.6     03-04  Mg     1.80     03-04      MICROBIOLOGY:  Culture - Urine (collected 01 Mar 2023 19:59)  Source: Clean Catch None  Final Report (03 Mar 2023 09:50):    <10,000 CFU/mL Normal Urogenital [place]    Culture - Blood (collected 01 Mar 2023 19:37)  Source: .Blood None  Preliminary Report (03 Mar 2023 05:01):    No growth to date.    Culture - Blood (collected 01 Mar 2023 18:15)  Source: .Blood Blood-Peripheral  Gram Stain (03 Mar 2023 17:55):    Growth in aerobic bottle: Gram positive cocci in pairs  Preliminary Report (03 Mar 2023 17:55):    Growth in aerobic bottle: Gram positive cocci in pairs    ***Blood Panel PCR results on this specimen are available    approximately 3 hours after the Gram stain result.***    Gram stain, PCR, and/or culture results may not always    correspond due to difference in methodologies.    ************************************************************    This PCR assay was performed by multiplex PCR. This    Assay tests for 66 bacterial and resistance gene targets.    Please refer to the [facility] test directory    at [email or web address] for details.  Organism: Blood Culture PCR (03 Mar 2023 20:12)  Organism: Blood Culture PCR (03 Mar 2023 20:12)      -  Blood PCR Panel: NEG      Method Type: PCR    Rapid RVP Result: NotDetec (03-01 @ 18:54)      RADIOLOGY:  imaging below personally reviewed and agree with findings  < from: CT Chest No Cont (03.03.23 @ 11:51) >  IMPRESSION:  Clustered and patchy bilateral upper lobe opacities, greater on the left   may be infectious or inflammatory.    < end of copied text >  < from: Xray Chest 1 View- PORTABLE-Urgent (03.01.23 @ 21:13) >  No acute pulmonary disease.    < end of copied text >  < from: CT Head No Cont (03.01.23 @ 19:33) >  IMPRESSION:  1. Evidence of nonacute supratentorial and infratentorial ischemic   events, as described in detail above.  2. No large arterial distribution acute infarct. Noacute intercranial   hemorrhage.  3. Fluid within bilateral mastoid air cells, bilateral mastoid antra and   bilateral middle ear cavities, not present on prior. Correlate clinically   for bilateral mastoiditis and/or otitis media. Patient has a cardiac   pacemaker device. As such, follow-up MR imaging is likely contraindicated.    Findings were communicated by Dr. Behr Ventura to Dr. Ángela Almaguer in   the emergency department at approximately 7:56 PM on 3/1/2023.    < end of copied text >

## 2023-03-04 NOTE — PROGRESS NOTE ADULT - PROBLEM SELECTOR PLAN 6
Diet: pureed diet, will advance following formal swallow eval   DVT ppx: lovenox   Dispo: medically active, eventually ANDRÉS - asymptomatic, patient saturating well on room air and uses albuterol at home as needed  - will provide albuterol inhaler prn q6h

## 2023-03-04 NOTE — SWALLOW BEDSIDE ASSESSMENT ADULT - ASR SWALLOW RECOMMEND DIAG
Cinesophagram to objectively assess swallow function given inconsistent coughing across trials to determine if cough is dysphagia related and given CT Chest results

## 2023-03-04 NOTE — CONSULT NOTE ADULT - ATTENDING COMMENTS
82 yo man with CAD, PPM, COPD transferred from [place] for ENT eval with c/f mastoiditis   Blood culture 3/1 with gpc in 1/4 bottles   On exam extensive ecchymosis arms, skin tear right arm   oriented x 2   CT chest patchy opacities upper lobes possilbe pneumonia   woul c/w empiric vanco and cefepime   increase cefepime 1 gm iv q 12 h   follow final blood culture identification

## 2023-03-04 NOTE — PROGRESS NOTE ADULT - PROBLEM SELECTOR PLAN 3
- stable, history of CABG in 2018  - will hold atorvastatin. lisinopril, and carvedilol until formal swallow eval completed - patient was transferred to [facility] for ENT evaluation of mastoiditis   - ENT stated low concern for acute otitis or mastoiditis at this time  - will continue with debrox drops 5-10 drops BID x 4 days and a saline nasal spray as per ENT recommendations

## 2023-03-04 NOTE — PROGRESS NOTE ADULT - PROBLEM SELECTOR PLAN 2
- patient was transferred to [facility] for ENT evaluation of mastoiditis   - ENT stated low concern for acute otitis or mastoiditis at this time  - will continue with debrox drops 5-10 drops BID x 4 days and a saline nasal spray as per ENT recommendations - CT chest without contrast showed clustered and patchy bilateral upper lobe opacities, greater on the left   may be infectious or inflammatory.  - treating with vanc, cefepime, and azithromycin   - f/u MRSA  - f/u urine strep pneumoniae   - satting well on room air

## 2023-03-04 NOTE — PROGRESS NOTE ADULT - PROBLEM SELECTOR PLAN 4
- s/p ppm, currently on rate control with carvedilol   - as per last cardiology note no longer on ac - stable, history of CABG in 2018  - will hold atorvastatin. lisinopril, and carvedilol until formal swallow eval completed - stable, history of CABG in 2018  - will hold atorvastatin. lisinopril, and carvedilol until formal swallow eval completed  - resumed atorvastatin  - will restart carvedilol at 3.125mg BID, and uptitrate as needed   - will hold lisinopril in setting on infection

## 2023-03-04 NOTE — PROGRESS NOTE ADULT - PROBLEM SELECTOR PLAN 1
- currently AOx2, at baseline patient is AOx3   - most likely related to a metabolic cause currently treating an occult infection   - continue with ceftriaxone   - blood cultures from 3/1 show NGTD, urine cultures negative   - f/u B12, folate, and syphilis   - TSH wnl   - passed bedside swallow eval, but pending formal speech and swallow eval as daughter had concerns about his eating ability at home   - RVP negative  - f/u CT chest non-con - currently AOx2, at baseline patient is AOx3   - most likely related to a metabolic cause currently treating an occult infection   - continue with ceftriaxone   - blood cultures from 3/1 show NGTD, urine cultures negative   - B12, folate, TSH wnl   - f/u syphilis   - RVP negative - currently AOx2, at baseline patient is AOx3   - most likely related to a occult infection   - blood cultures from 3/1 show gram positive cocci in pairs  - currently on vanc, cefepime, and azithromycin   - ID consulted, will f/u   - urine cultures negative   - B12, folate, TSH wnl   - f/u syphilis   - RVP negative - currently AOx2, at baseline patient is AOx3   - most likely related to a occult infection   - blood cultures from 3/1 show gram positive cocci in pairs  - repeat blood cultures sent on 3/4  - currently on vanc, cefepime, and azithromycin   - ID consulted, will f/u   - urine cultures negative   - B12, folate, TSH wnl   - f/u syphilis   - RVP negative

## 2023-03-04 NOTE — ED POST DISCHARGE NOTE - RESULT SUMMARY
+ BC aerobic and anerobic bottles.  I spoke with CHAIM Gonzalez on 9L at [facility] and she is aware.  Rebecca CLEMENS

## 2023-03-04 NOTE — SWALLOW BEDSIDE ASSESSMENT ADULT - COMMENTS
Internal Medicine note 3/4  "83y male with  hypertension, hyperlipidemia, previous stroke, CHF, CAD status post MI and stent, AF (no longer on AC), with PPM, COPD and active smoker who was transferred from [facility] with AMS for ENT evaluation of mastoiditis."    CT Chest "Tracheobronchial secretions  Bilateral calcified pleural plaques. Left-sided pleural thickening. Small loculated left pleural effusion. Partial atelectasis left lower lobe. Clustered and patchy bilateral upper lobe opacities, greater on the left.."    Patient seen at bedside this AM for an initial assessment of the swallow function, at which time patient was alert. Patient is Hard of Hearing (per RN) and required frequent repetition of directives. Patient is able to verbalize wants/needs.

## 2023-03-04 NOTE — SWALLOW BEDSIDE ASSESSMENT ADULT - ADDITIONAL RECOMMENDATIONS
1. this service to follow up as schedule permits for diet tolerance. 2. medical team advised to reconsult this service if change in medical status and/or patient's tolerance to recommended PO diet.

## 2023-03-04 NOTE — SWALLOW BEDSIDE ASSESSMENT ADULT - SWALLOW EVAL: DIAGNOSIS
1. Functional oral stage for puree, moderately thick liquids, mildly thick liquids and thin liquids marked by adequate oral acceptance, collection and transfer. 2. Functional pharyngeal phase for puree, moderately thick liquids and mildly thick liquids marked by a present pharyngeal swallow trigger with hyolaryngeal elevation noted upon digital palpation without evidence of impaired airway protection. 3. Moderate pharyngeal dysphagia for thin liquids marked by a suspected delayed pharyngeal swallow trigger with hyolaryngeal elevation noted upon digital palpation with immediate weak coughing suggestive of impaired airway protection. of Note: Patient exhibited intermittent weak coughing throughout assessment.

## 2023-03-05 NOTE — DISCHARGE NOTE PROVIDER - NSDCCPCAREPLAN_GEN_ALL_CORE_FT
PRINCIPAL DISCHARGE DIAGNOSIS  Diagnosis: AMS (altered mental status)  Assessment and Plan of Treatment: When you came to the hospital you were confused. The reason for you confusion was most likely due to an infection in your blood. As a result, you were treated woth antibiotics and your mental status greatly improved. Please continue to pascual you antibiotics as precribed to ensure your infection clears.

## 2023-03-05 NOTE — PROGRESS NOTE ADULT - PROBLEM SELECTOR PLAN 3
- patient was transferred to [facility] for ENT evaluation of mastoiditis   - ENT stated low concern for acute otitis or mastoiditis at this time  - will continue with debrox drops 5-10 drops BID x 4 days and a saline nasal spray as per ENT recommendations

## 2023-03-05 NOTE — DISCHARGE NOTE PROVIDER - NSDCMRMEDTOKEN_GEN_ALL_CORE_FT
albuterol: 2 puff(s) inhaled every 8 hours, As Needed  atorvastatin 80 mg oral tablet: 1 tab(s) orally once a day (at bedtime)  carvedilol 12.5 mg oral tablet: orally once a day  lisinopril 40 mg oral tablet: 1 tab(s) orally once a day  metFORMIN 500 mg oral tablet: orally once a day

## 2023-03-05 NOTE — PROGRESS NOTE ADULT - PROBLEM SELECTOR PLAN 4
- stable, history of CABG in 2018  - will hold atorvastatin. lisinopril, and carvedilol until formal swallow eval completed  - resumed atorvastatin  - will restart carvedilol at 3.125mg BID, and uptitrate as needed   - will hold lisinopril in setting on infection
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
## 2023-03-05 NOTE — PROGRESS NOTE ADULT - PROBLEM SELECTOR PLAN 7
Diet: pureed diet, speech recommends cineesophagogram   DVT ppx: lovenox   Dispo: medically active, eventually ANDRÉS

## 2023-03-05 NOTE — PROGRESS NOTE ADULT - ASSESSMENT
83M with hypertension, hyperlipidemia, previous stroke, CHF, CAD status post MI and stent, AF (no longer on AC), with PPM, COPD and active smoker who was transferred from [facility] with AMS with CTH showing fluid in middle ear and mastoid cavity, ENT evaluation stating low concern for acute otitis or mastoiditis, unclear etiology of AMS, course complicated by BCx GPC in 1 out of 4 bottles, PCR negative. ID consulted for bacteremia.     Patient is AAOx2, poor historian, limited history  Reports feeling "good" without any acute complaints  Denies cough, abdominal pain, sputum production, dyspnea, n, v, diarrhea, dysuria  Has known PPM    WORK UP  No leukocyotis  Cr 0.9  UA negative  UCx normal [place]  CTH negative  CXR clear  CT Chest with patchy bilateral UL opacity  MRSA negative  RVP negative  BCx (3/1) 1 out of 4 GPC, PCR negative    ANTIBIOTIC  azithromycin  IVPB 500 every 24 hours  cefepime   IVPB 1000 daily  vancomycin  IVPB 1000 every 12 hours    DIAGNOSIS and IMPRESSION  #Altered Mental Status  #1 out of 4 GPC in Blood, PCR negative  #Abnormal CT Chest ?Pneumonia    - unclear etiology of AMS  - unclear if GPC is true pathogen vs contaminant  - denies respiratory symptoms, remains afebrile without leukocytosis     RECOMMENDATIONS  - c/w empiric vanco and cefepime for now pending repeat cultures  - monitor fever  - trend WBC  - follow up Blood Culture x2  - noninfectious work up per primary team      PT TO BE SEEN. PRELIM NOTE  PENDING RECS. PLEASE WAIT FOR FINAL RECS AFTER DISCUSSION WITH ATTENDINGJesusita Muñiz DO, PGY-4   ID fellow  [employer] Teams Preferred  After 5pm/weekends call [phone]   83M with hypertension, hyperlipidemia, previous stroke, CHF, CAD status post MI and stent, AF (no longer on AC), with PPM, COPD and active smoker who was transferred from [facility] with AMS with CTH showing fluid in middle ear and mastoid cavity, ENT evaluation stating low concern for acute otitis or mastoiditis, unclear etiology of AMS, course complicated by BCx GPC in 1 out of 4 bottles, PCR negative. ID consulted for bacteremia.     Patient is AAOx2, poor historian, limited history  Reports feeling "good" without any acute complaints  Denies cough, abdominal pain, sputum production, dyspnea, n, v, diarrhea, dysuria  Has known PPM    WORK UP  No leukocyotis  Cr 0.9  UA negative  UCx normal [place]  CTH negative  CXR clear  CT Chest with patchy bilateral UL opacity  MRSA negative  RVP negative  BCx (3/1) 1 out of 4 GPC, PCR negative    ANTIBIOTIC  azithromycin  IVPB 500 every 24 hours  cefepime   IVPB 1000 daily  vancomycin  IVPB 1000 every 12 hours    DIAGNOSIS and IMPRESSION  #Altered Mental Status  #1 out of 4 GPC in Blood, PCR negative  #Abnormal CT Chest ?Pneumonia    - unclear etiology of AMS  - unclear if GPC is true pathogen vs contaminant  - denies respiratory symptoms, remains afebrile without leukocytosis     RECOMMENDATIONS  - c/w empiric vanco and cefepime for now pending repeat cultures  - monitor fever  - trend WBC  - follow up Blood Culture x2  - noninfectious work up per primary team    Case d/w attending and primary team      Xavi Muñiz DO, PGY-4   ID fellow  [employer] Teams Preferred  After 5pm/weekends call [phone]   83M with hypertension, hyperlipidemia, previous stroke, CHF, CAD status post MI and stent, AF (no longer on AC), with PPM, COPD and active smoker who was transferred from [facility] with AMS with CTH showing fluid in middle ear and mastoid cavity, ENT evaluation stating low concern for acute otitis or mastoiditis, unclear etiology of AMS, course complicated by BCx GPC in 1 out of 4 bottles, PCR negative. ID consulted for bacteremia.     Patient is AAOx2, poor historian, limited history  Reports feeling "good" without any acute complaints  Denies cough, abdominal pain, sputum production, dyspnea, n, v, diarrhea, dysuria  Has known PPM  CT aortic and mitral valve repair     WORK UP  No leukocyotis  Cr 0.9  UA negative  UCx normal [place]  CTH negative  CXR clear  CT Chest with patchy bilateral UL opacity  MRSA negative  RVP negative  BCx (3/1) 1 out of 4 GPC, PCR negative    ANTIBIOTIC  azithromycin  IVPB 500 every 24 hours  cefepime   IVPB 1000 daily  vancomycin  IVPB 1000 every 12 hours    DIAGNOSIS and IMPRESSION  #Altered Mental Status  #1 out of 4 GPC in Blood, PCR negative  #Abnormal CT Chest ?Pneumonia    - unclear etiology of AMS  - unclear if GPC is true pathogen vs contaminant  - denies respiratory symptoms, remains afebrile without leukocytosis     RECOMMENDATIONS  - c/w empiric vanco and cefepime for now pending repeat cultures  - monitor fever  - trend WBC  - follow up Blood Culture x2  - noninfectious work up per primary team    Case d/w attending and primary team      Xavi Muñiz DO, PGY-4   ID fellow  [employer] Teams Preferred  After 5pm/weekends call [phone]

## 2023-03-05 NOTE — PROGRESS NOTE ADULT - PROBLEM SELECTOR PLAN 1
- currently AOx2, at baseline patient is AOx3   - most likely related to a occult infection   - blood cultures from 3/1 show gram positive cocci in pairs  - repeat blood cultures sent on 3/4  - currently on vanc, cefepime, and azithromycin   - ID consulted, will f/u   - urine cultures negative   - B12, folate, TSH wnl   - f/u syphilis   - RVP negative - currently AOx1-2, at baseline patient is AOx3   - most likely related to a occult infection   - blood cultures from 3/1 show gram positive cocci in pairs  - ID consulted, will f/u   - urine cultures negative   - B12, folate, TSH wnl   - f/u syphilis, RVP negative    Plan:  - currently on vanc, cefepime  [ ] f/u repeat BCx 3/4 for whether GPCs true infection, in which case would need to eval source (and pt has PPM)

## 2023-03-05 NOTE — PROGRESS NOTE ADULT - PROBLEM SELECTOR PLAN 2
- CT chest without contrast showed clustered and patchy bilateral upper lobe opacities, greater on the left   may be infectious or inflammatory.  - treating with vanc, cefepime, and azithromycin   - f/u MRSA  - f/u urine strep pneumoniae   - satting well on room air - CT chest without contrast showed clustered and patchy bilateral upper lobe opacities, greater on the left   may be infectious or inflammatory.  - treating with vanc, cefepime  - MRSA neg  - f/u urine strep pneumoniae   - satting well on room air

## 2023-03-05 NOTE — PROGRESS NOTE ADULT - ASSESSMENT
83y male with  hypertension, hyperlipidemia, previous stroke, CHF, CAD status post MI and stent, AF (no longer on AC), with PPM, COPD and active smoker who was transferred from [facility] with AMS for ENT evaluation of mastoiditis. 83y male with  hypertension, hyperlipidemia, previous stroke, CHF, CAD status post MI and stent, AF (no longer on AC), with PPM, COPD and active smoker who was transferred from [facility] with AMS for ENT evaluation of mastoiditis. Does not have mastoiditis, but found to have AMS and PNA.

## 2023-03-05 NOTE — PROGRESS NOTE ADULT - SUBJECTIVE AND OBJECTIVE BOX
Follow Up:  1/4 GPC in blood    Interval History/ROS: Remains confused, limited ROS, but denies any acute complaints, denies pain fever or chills      REVIEW OF SYSTEMS  [  ] ROS unobtainable because:    [ x ] All other systems negative except as noted below    Constitutional:  [ ] fever [ ] chills  [ ] weight loss  [ ]night sweat  [ ]poor appetite/PO intake [ ]fatigue   Skin:  [ ] rash [ ] phlebitis	  Eyes: [ ] icterus [ ] pain  [ ] discharge	  ENMT: [ ] sore throat  [ ] thrush [ ] ulcers [ ] exudates [ ]anosmia  Respiratory: [ ] dyspnea [ ] hemoptysis [ ] cough [ ] sputum	  Cardiovascular:  [ ] chest pain [ ] palpitations [ ] edema	  Gastrointestinal:  [ ] nausea [ ] vomiting [ ] diarrhea [ ] constipation [ ] pain	  Genitourinary:  [ ] dysuria [ ] frequency [ ] hematuria [ ] discharge [ ] flank pain  [ ] incontinence  Musculoskeletal:  [ ] myalgias [ ] arthralgias [ ] arthritis  [ ] back pain  Neurological:  [ ] headache [ ] weakness [ ] seizures  [ ] confusion/altered mental status    Allergies  penicillin (Anaphylaxis)        ANTIMICROBIALS:    cefepime   IVPB 1000 every 12 hours  vancomycin  IVPB 1000 every 12 hours        OTHER MEDS: MEDICATIONS  (STANDING):  acetaminophen     Tablet .. 650 every 6 hours PRN  atorvastatin 80 at bedtime  carvedilol 3.125 every 12 hours  dextrose 50% Injectable 25 once  dextrose 50% Injectable 12.5 once  dextrose 50% Injectable 25 once  dextrose Oral Gel 15 once PRN  enoxaparin Injectable 40 every 24 hours  glucagon  Injectable 1 once  influenza  Vaccine (HIGH DOSE) 0.7 once  insulin lispro (ADMELOG) corrective regimen sliding scale  three times a day before meals  insulin lispro (ADMELOG) corrective regimen sliding scale  at bedtime  melatonin 3 at bedtime PRN      Vital Signs Last 24 Hrs  T(F): 98.5 (03-05-23 @ 05:00), Max: 98.7 (03-02-23 @ 09:25)    Vital Signs Last 24 Hrs  HR: 77 (03-05-23 @ 05:00) (73 - 79)  BP: 118/73 (03-05-23 @ 05:00) (118/73 - 123/76)  RR: 18 (03-05-23 @ 05:00)  SpO2: 100% (03-05-23 @ 05:00) (100% - 100%)  Wt(kg): --    EXAM:    Constitutional:  well preserved, comfortable  Head/Eyes: no icterus  ENT:  supple, no cervical lymphadenopathy   LUNGS:  CTA  CVS:  regular rhythm, no murmur  Abd:  soft, non-tender; non-distended  Ext:  b/l skin abrasions in UE  Vascular:  IV site no erythema tenderness or discharge  Neuro: AAO X 2, non- focal    Labs:                        18.3   10.34 )-----------( 112      ( 04 Mar 2023 05:34 )             54.1     03-04    132<L>  |  96<L>  |  16  ----------------------------<  105<H>  4.1   |  26  |  0.95    Ca    9.6      04 Mar 2023 05:34  Phos  2.6     03-04  Mg     1.80     03-04        WBC Trend:  WBC Count: 10.34 (03-04-23 @ 05:34)  WBC Count: 8.00 (03-03-23 @ 07:40)  WBC Count: 8.59 (03-02-23 @ 06:30)  WBC Count: 8.58 (03-01-23 @ 18:54)      Creatine Trend:  Creatinine, Serum: 0.95 (03-04)  Creatinine, Serum: 1.00 (03-03)  Creatinine, Serum: 1.05 (03-02)  Creatinine, Serum: 1.46 (03-01)      Liver Biochemical Testing Trend:  Alanine Aminotransferase (ALT/SGPT): 10 (03-02)  Alanine Aminotransferase (ALT/SGPT): 16 (03-01)  Alanine Aminotransferase (ALT/SGPT): 15 (11-23)  Alanine Aminotransferase (ALT/SGPT): 20 (11-22)  Aspartate Aminotransferase (AST/SGOT): 16 (03-02-23 @ 06:30)  Aspartate Aminotransferase (AST/SGOT): 17 (03-01-23 @ 18:54)  Aspartate Aminotransferase (AST/SGOT): 16 (11-23-22 @ 08:07)  Aspartate Aminotransferase (AST/SGOT): 18 (11-22-22 @ 20:42)  Bilirubin Total, Serum: 1.4 (03-02)  Bilirubin Total, Serum: 1.9 (03-01)  Bilirubin Total, Serum: 1.0 (11-23)  Bilirubin Total, Serum: 0.8 (11-22)      Trend LDH          MICROBIOLOGY:  Vancomycin Level, Trough: 11.8 (03-05 @ 07:32)    MRSA PCR Result.: NotDetec (03-03-23 @ 19:00)      Culture - Urine (collected 01 Mar 2023 19:59)  Source: Clean Catch None  Final Report:    <10,000 CFU/mL Normal Urogenital Halie    Culture - Blood (collected 01 Mar 2023 19:37)  Source: .Blood None  Preliminary Report:    No growth to date.    Culture - Blood (collected 01 Mar 2023 18:15)  Source: .Blood Blood-Peripheral  Preliminary Report:    Growth in aerobic bottle: Gram positive cocci in pairs    ***Blood Panel PCR results on this specimen are available    approximately 3 hours after the Gram stain result.***    Gram stain, PCR, and/or culture results may not always    correspond due to difference in methodologies.    ************************************************************    This PCR assay was performed by multiplex PCR. This    Assay tests for 66 bacterial and resistance gene targets.    Please refer to the [facility] test directory    at [email or web address] for details.  Organism: Blood Culture PCR  Organism: Blood Culture PCR    Sensitivities:      -  Blood PCR Panel: NEG      Method Type: PCR            Treponema Pallidum Antibody Interpretation: Negative (03-03-23 @ 07:40)              Rapid RVP Result: NotDetec (03-01 @ 18:54)        RADIOLOGY:  imaging below personally reviewed      CT Chest No Cont:   ACC: [id number] EXAM:  CT CHEST   ORDERED BY: IRENA CORRIGAN     PROCEDURE DATE:  03/03/2023          INTERPRETATION:  EXAMINATION: CT CHEST    CLINICAL INDICATION: Dyspnea.    TECHNIQUE: Noncontrast CT of the chest was obtained.    COMPARISON: 11/22/2022.    FINDINGS:    AIRWAYS AND LUNGS: Tracheobronchial secretions  Bilateral calcified   pleural plaques. Left-sided pleural thickening. Small loculated left   pleural effusion. Partial atelectasis left lower lobe. Clustered and   patchy bilateral upper lobe opacities, greater on the left..    MEDIASTINUM AND PLEURA: There are no enlarged mediastinal, hilar or   axillary lymph nodes. The visualized portion of the thyroid gland is   unremarkable. There is no pneumothorax.    HEART AND VESSELS: There is cardiomegaly.  There are atherosclerotic   calcifications of the aorta and coronary arteries.  There is no   pericardial effusion.  Sternotomy with aortic and mitral valve repair.   Nonunion sternal fragments. Left-sided pacemaker.    UPPERABDOMEN: Images of the upper abdomen demonstrate cholelithiasis.    BONES AND SOFT TISSUES: The bones are unremarkable.  The soft tissues are   unremarkable.    IMPRESSION:  Clustered and patchy bilateral upper lobe opacities, greater on the left   may be infectious or inflammatory.       Follow Up:  1/4 GPC in blood    Interval History/ROS: Remains confused, limited ROS, but denies any acute complaints, denies pain fever or chills      REVIEW OF SYSTEMS  [  ] ROS unobtainable because:    [ x ] All other systems negative except as noted below    Constitutional:  [ ] fever [ ] chills  [ ] weight loss  [ ]night sweat  [ ]poor appetite/PO intake [ ]fatigue   Skin:  [ ] rash [ ] phlebitis	  Eyes: [ ] icterus [ ] pain  [ ] discharge	  ENMT: [ ] sore throat  [ ] thrush [ ] ulcers [ ] exudates [ ]anosmia  Respiratory: [ ] dyspnea [ ] hemoptysis [ ] cough [ ] sputum	  Cardiovascular:  [ ] chest pain [ ] palpitations [ ] edema	  Gastrointestinal:  [ ] nausea [ ] vomiting [ ] diarrhea [ ] constipation [ ] pain	  Genitourinary:  [ ] dysuria [ ] frequency [ ] hematuria [ ] discharge [ ] flank pain  [ ] incontinence  Musculoskeletal:  [ ] myalgias [ ] arthralgias [ ] arthritis  [ ] back pain  Neurological:  [ ] headache [ ] weakness [ ] seizures  [ ] confusion/altered mental status    Allergies  penicillin (Anaphylaxis)        ANTIMICROBIALS:    cefepime   IVPB 1000 every 12 hours  vancomycin  IVPB 1000 every 12 hours        OTHER MEDS: MEDICATIONS  (STANDING):  acetaminophen     Tablet .. 650 every 6 hours PRN  atorvastatin 80 at bedtime  carvedilol 3.125 every 12 hours  dextrose 50% Injectable 25 once  dextrose 50% Injectable 12.5 once  dextrose 50% Injectable 25 once  dextrose Oral Gel 15 once PRN  enoxaparin Injectable 40 every 24 hours  glucagon  Injectable 1 once  influenza  Vaccine (HIGH DOSE) 0.7 once  insulin lispro (ADMELOG) corrective regimen sliding scale  three times a day before meals  insulin lispro (ADMELOG) corrective regimen sliding scale  at bedtime  melatonin 3 at bedtime PRN      Vital Signs Last 24 Hrs  T(F): 98.5 (03-05-23 @ 05:00), Max: 98.7 (03-02-23 @ 09:25)    Vital Signs Last 24 Hrs  HR: 77 (03-05-23 @ 05:00) (73 - 79)  BP: 118/73 (03-05-23 @ 05:00) (118/73 - 123/76)  RR: 18 (03-05-23 @ 05:00)  SpO2: 100% (03-05-23 @ 05:00) (100% - 100%)  Wt(kg): --    EXAM:    Constitutional:  well preserved, comfortable  Head/Eyes: no icterus  ENT:  supple, no cervical lymphadenopathy   LUNGS:  CTA  CVS:  regular rhythm, no murmur  PPM left   Abd:  soft, non-tender; non-distended  Ext:  b/l skin abrasions in UE  Vascular:  IV site no erythema tenderness or discharge  Neuro: AAO X 2, non- focal    Labs:                        18.3   10.34 )-----------( 112      ( 04 Mar 2023 05:34 )             54.1     03-04    132<L>  |  96<L>  |  16  ----------------------------<  105<H>  4.1   |  26  |  0.95    Ca    9.6      04 Mar 2023 05:34  Phos  2.6     03-04  Mg     1.80     03-04        WBC Trend:  WBC Count: 10.34 (03-04-23 @ 05:34)  WBC Count: 8.00 (03-03-23 @ 07:40)  WBC Count: 8.59 (03-02-23 @ 06:30)  WBC Count: 8.58 (03-01-23 @ 18:54)      Creatine Trend:  Creatinine, Serum: 0.95 (03-04)  Creatinine, Serum: 1.00 (03-03)  Creatinine, Serum: 1.05 (03-02)  Creatinine, Serum: 1.46 (03-01)      Liver Biochemical Testing Trend:  Alanine Aminotransferase (ALT/SGPT): 10 (03-02)  Alanine Aminotransferase (ALT/SGPT): 16 (03-01)  Alanine Aminotransferase (ALT/SGPT): 15 (11-23)  Alanine Aminotransferase (ALT/SGPT): 20 (11-22)  Aspartate Aminotransferase (AST/SGOT): 16 (03-02-23 @ 06:30)  Aspartate Aminotransferase (AST/SGOT): 17 (03-01-23 @ 18:54)  Aspartate Aminotransferase (AST/SGOT): 16 (11-23-22 @ 08:07)  Aspartate Aminotransferase (AST/SGOT): 18 (11-22-22 @ 20:42)  Bilirubin Total, Serum: 1.4 (03-02)  Bilirubin Total, Serum: 1.9 (03-01)  Bilirubin Total, Serum: 1.0 (11-23)  Bilirubin Total, Serum: 0.8 (11-22)        MICROBIOLOGY:  Vancomycin Level, Trough: 11.8 (03-05 @ 07:32)    MRSA PCR Result.: NotDetec (03-03-23 @ 19:00)      Culture - Urine (collected 01 Mar 2023 19:59)  Source: Clean Catch None  Final Report:    <10,000 CFU/mL Normal Urogenital Halie    Culture - Blood (collected 01 Mar 2023 19:37)  Source: .Blood None  Preliminary Report:    No growth to date.    Culture - Blood (collected 01 Mar 2023 18:15)  Source: .Blood Blood-Peripheral  Preliminary Report:    Growth in aerobic bottle: Gram positive cocci in pairs    ***Blood Panel PCR results on this specimen are available    approximately 3 hours after the Gram stain result.***    Gram stain, PCR, and/or culture results may not always    correspond due to difference in methodologies.    ************************************************************    This PCR assay was performed by multiplex PCR. This    Assay tests for 66 bacterial and resistance gene targets.    Please refer to the [facility] test directory    at [email or web address] for details.  Organism: Blood Culture PCR  Organism: Blood Culture PCR    Sensitivities:      -  Blood PCR Panel: NEG      Method Type: PCR            Treponema Pallidum Antibody Interpretation: Negative (03-03-23 @ 07:40)              Rapid RVP Result: NotDetec (03-01 @ 18:54)        RADIOLOGY:    CT Chest No Cont:   ACC: [id number] EXAM:  CT CHEST   ORDERED BY: IRENA CORRIGAN     PROCEDURE DATE:  03/03/2023          INTERPRETATION:  EXAMINATION: CT CHEST    CLINICAL INDICATION: Dyspnea.    TECHNIQUE: Noncontrast CT of the chest was obtained.    COMPARISON: 11/22/2022.    FINDINGS:    AIRWAYS AND LUNGS: Tracheobronchial secretions  Bilateral calcified   pleural plaques. Left-sided pleural thickening. Small loculated left   pleural effusion. Partial atelectasis left lower lobe. Clustered and   patchy bilateral upper lobe opacities, greater on the left..    MEDIASTINUM AND PLEURA: There are no enlarged mediastinal, hilar or   axillary lymph nodes. The visualized portion of the thyroid gland is   unremarkable. There is no pneumothorax.    HEART AND VESSELS: There is cardiomegaly.  There are atherosclerotic   calcifications of the aorta and coronary arteries.  There is no   pericardial effusion.  Sternotomy with aortic and mitral valve repair.   Nonunion sternal fragments. Left-sided pacemaker.    UPPERABDOMEN: Images of the upper abdomen demonstrate cholelithiasis.    BONES AND SOFT TISSUES: The bones are unremarkable.  The soft tissues are   unremarkable.    IMPRESSION:  Clustered and patchy bilateral upper lobe opacities, greater on the left   may be infectious or inflammatory.

## 2023-03-06 NOTE — PROVIDER CONTACT NOTE (OTHER) - ASSESSMENT
A&O x1 /101 Hear Rate 110 O2 97% on nonrebreather
A&O x1 /63 Heart rate 101 O2 sat 95% on nonrebreather

## 2023-03-06 NOTE — PROGRESS NOTE ADULT - SUBJECTIVE AND OBJECTIVE BOX
DATE OF SERVICE: 03-06-23 @ 06:47    Patient is a 83y old  Male who presents with a chief complaint of AMS, r/o mastoiditis (05 Mar 2023 14:40)      SUBJECTIVE / OVERNIGHT EVENTS:  No acute events overnight, patient reports doing well and all questions answered at this time.     Additional Review of Systems:  Denies any other acute sx including f/c, HA, cough, sore throat, eye/ear changes, rhinorrhea, CP, palpitations, SOB, n/v, abdominal pain, back pain, bowel/bladder changes, fatigue, numbness or tingling.    MEDICATIONS  (STANDING):  atorvastatin 80 milliGRAM(s) Oral at bedtime  carvedilol 3.125 milliGRAM(s) Oral every 12 hours  cefepime   IVPB 1000 milliGRAM(s) IV Intermittent every 12 hours  dextrose 5%. 1000 milliLiter(s) (50 mL/Hr) IV Continuous <Continuous>  dextrose 5%. 1000 milliLiter(s) (100 mL/Hr) IV Continuous <Continuous>  dextrose 50% Injectable 25 Gram(s) IV Push once  dextrose 50% Injectable 12.5 Gram(s) IV Push once  dextrose 50% Injectable 25 Gram(s) IV Push once  enoxaparin Injectable 40 milliGRAM(s) SubCutaneous every 24 hours  glucagon  Injectable 1 milliGRAM(s) IntraMuscular once  influenza  Vaccine (HIGH DOSE) 0.7 milliLiter(s) IntraMuscular once  insulin lispro (ADMELOG) corrective regimen sliding scale   SubCutaneous three times a day before meals  insulin lispro (ADMELOG) corrective regimen sliding scale   SubCutaneous at bedtime  nicotine -   7 mG/24Hr(s) Patch 1 Patch Transdermal every 24 hours  sodium chloride 0.65% Nasal 1 Spray(s) Both Nostrils two times a day  vancomycin  IVPB 1000 milliGRAM(s) IV Intermittent every 12 hours    MEDICATIONS  (PRN):  acetaminophen     Tablet .. 650 milliGRAM(s) Oral every 6 hours PRN Temp greater or equal to 38C (100.4F), Mild Pain (1 - 3)  dextrose Oral Gel 15 Gram(s) Oral once PRN Blood Glucose LESS THAN 70 milliGRAM(s)/deciliter  melatonin 3 milliGRAM(s) Oral at bedtime PRN Insomnia      Vital Signs Last 24 Hrs  T(C): 36.7 (06 Mar 2023 05:24), Max: 36.7 (06 Mar 2023 05:24)  T(F): 98 (06 Mar 2023 05:24), Max: 98 (06 Mar 2023 05:24)  HR: 91 (06 Mar 2023 05:24) (75 - 96)  BP: 149/77 (06 Mar 2023 05:24) (125/77 - 155/83)  BP(mean): --  RR: 18 (06 Mar 2023 05:24) (18 - 19)  SpO2: 100% (06 Mar 2023 05:24) (100% - 100%)    Parameters below as of 06 Mar 2023 05:24  Patient On (Oxygen Delivery Method): room air      CAPILLARY BLOOD GLUCOSE      POCT Blood Glucose.: 137 mg/dL (05 Mar 2023 21:32)  POCT Blood Glucose.: 126 mg/dL (05 Mar 2023 18:08)  POCT Blood Glucose.: 153 mg/dL (05 Mar 2023 12:10)  POCT Blood Glucose.: 121 mg/dL (05 Mar 2023 08:44)    I&O's Summary      PHYSICAL EXAM:  GENERAL: Clinically well-appearing and comfortable  HEAD:  Atraumatic, Normocephalic  EYES: EOMI, PERRLA, conjunctiva and sclera clear  NECK: Supple, No JVD  CHEST/LUNG: Clear to auscultation bilaterally; No wheeze  HEART: Regular rate and rhythm; No murmurs, rubs, or gallops  ABDOMEN: Soft, Nontender, Nondistended; Bowel sounds present  EXTREMITIES:  2+ Peripheral Pulses, No clubbing, cyanosis, or edema  PSYCH: Normal mood, Normal affect  NEUROLOGY: non-focal  SKIN: No rashes or lesions    LABS:                    RADIOLOGY & ADDITIONAL TESTS:    Imaging Personally Reviewed:    Consultant(s) Notes Reviewed:      Care Discussed with Consultants/Other Providers:   DATE OF SERVICE: 03-06-23 @ 06:47    Patient is a 83y old  Male who presents with a chief complaint of AMS, r/o mastoiditis (05 Mar 2023 14:40)      SUBJECTIVE / OVERNIGHT EVENTS: Limited 2/2 mental status  Pt seen and evaluated, denies any acute complaints.    Additional Review of Systems: Unable to perform 2/2 mental status    MEDICATIONS  (STANDING):  atorvastatin 80 milliGRAM(s) Oral at bedtime  carvedilol 3.125 milliGRAM(s) Oral every 12 hours  cefepime   IVPB 1000 milliGRAM(s) IV Intermittent every 12 hours  dextrose 5%. 1000 milliLiter(s) (50 mL/Hr) IV Continuous <Continuous>  dextrose 5%. 1000 milliLiter(s) (100 mL/Hr) IV Continuous <Continuous>  dextrose 50% Injectable 25 Gram(s) IV Push once  dextrose 50% Injectable 12.5 Gram(s) IV Push once  dextrose 50% Injectable 25 Gram(s) IV Push once  enoxaparin Injectable 40 milliGRAM(s) SubCutaneous every 24 hours  glucagon  Injectable 1 milliGRAM(s) IntraMuscular once  influenza  Vaccine (HIGH DOSE) 0.7 milliLiter(s) IntraMuscular once  insulin lispro (ADMELOG) corrective regimen sliding scale   SubCutaneous three times a day before meals  insulin lispro (ADMELOG) corrective regimen sliding scale   SubCutaneous at bedtime  nicotine -   7 mG/24Hr(s) Patch 1 Patch Transdermal every 24 hours  sodium chloride 0.65% Nasal 1 Spray(s) Both Nostrils two times a day  vancomycin  IVPB 1000 milliGRAM(s) IV Intermittent every 12 hours    MEDICATIONS  (PRN):  acetaminophen     Tablet .. 650 milliGRAM(s) Oral every 6 hours PRN Temp greater or equal to 38C (100.4F), Mild Pain (1 - 3)  dextrose Oral Gel 15 Gram(s) Oral once PRN Blood Glucose LESS THAN 70 milliGRAM(s)/deciliter  melatonin 3 milliGRAM(s) Oral at bedtime PRN Insomnia      Vital Signs Last 24 Hrs  T(C): 36.7 (06 Mar 2023 05:24), Max: 36.7 (06 Mar 2023 05:24)  T(F): 98 (06 Mar 2023 05:24), Max: 98 (06 Mar 2023 05:24)  HR: 91 (06 Mar 2023 05:24) (75 - 96)  BP: 149/77 (06 Mar 2023 05:24) (125/77 - 155/83)  BP(mean): --  RR: 18 (06 Mar 2023 05:24) (18 - 19)  SpO2: 100% (06 Mar 2023 05:24) (100% - 100%)    Parameters below as of 06 Mar 2023 05:24  Patient On (Oxygen Delivery Method): room air      CAPILLARY BLOOD GLUCOSE      POCT Blood Glucose.: 137 mg/dL (05 Mar 2023 21:32)  POCT Blood Glucose.: 126 mg/dL (05 Mar 2023 18:08)  POCT Blood Glucose.: 153 mg/dL (05 Mar 2023 12:10)  POCT Blood Glucose.: 121 mg/dL (05 Mar 2023 08:44)    I&O's Summary      PHYSICAL EXAM:  GENERAL: Clinically well-appearing and comfortable  HEAD:  Atraumatic, Normocephalic  EYES: EOMI, PERRLA, conjunctiva and sclera clear  NECK: Supple, No JVD  CHEST/LUNG: Clear to auscultation bilaterally; No wheeze  HEART: Regular rate and rhythm; No murmurs, rubs, or gallops  ABDOMEN: Soft, Nontender, Nondistended; Bowel sounds present  EXTREMITIES:  2+ Peripheral Pulses, No clubbing, cyanosis, or edema  PSYCH: Normal mood, Normal affect  NEUROLOGY: non-focal  SKIN: No rashes or lesions    LABS:                    RADIOLOGY & ADDITIONAL TESTS:    Imaging Personally Reviewed:    Consultant(s) Notes Reviewed:      Care Discussed with Consultants/Other Providers:   DATE OF SERVICE: 03-06-23 @ 06:47    Patient is a 83y old  Male who presents with a chief complaint of AMS, r/o mastoiditis (05 Mar 2023 14:40)      SUBJECTIVE / OVERNIGHT EVENTS: Limited 2/2 mental status  Pt seen and evaluated, denies any acute complaints.    Additional Review of Systems: Unable to perform 2/2 mental status    MEDICATIONS  (STANDING):  atorvastatin 80 milliGRAM(s) Oral at bedtime  carvedilol 3.125 milliGRAM(s) Oral every 12 hours  cefepime   IVPB 1000 milliGRAM(s) IV Intermittent every 12 hours  dextrose 5%. 1000 milliLiter(s) (50 mL/Hr) IV Continuous <Continuous>  dextrose 5%. 1000 milliLiter(s) (100 mL/Hr) IV Continuous <Continuous>  dextrose 50% Injectable 25 Gram(s) IV Push once  dextrose 50% Injectable 12.5 Gram(s) IV Push once  dextrose 50% Injectable 25 Gram(s) IV Push once  enoxaparin Injectable 40 milliGRAM(s) SubCutaneous every 24 hours  glucagon  Injectable 1 milliGRAM(s) IntraMuscular once  influenza  Vaccine (HIGH DOSE) 0.7 milliLiter(s) IntraMuscular once  insulin lispro (ADMELOG) corrective regimen sliding scale   SubCutaneous three times a day before meals  insulin lispro (ADMELOG) corrective regimen sliding scale   SubCutaneous at bedtime  nicotine -   7 mG/24Hr(s) Patch 1 Patch Transdermal every 24 hours  sodium chloride 0.65% Nasal 1 Spray(s) Both Nostrils two times a day  vancomycin  IVPB 1000 milliGRAM(s) IV Intermittent every 12 hours    MEDICATIONS  (PRN):  acetaminophen     Tablet .. 650 milliGRAM(s) Oral every 6 hours PRN Temp greater or equal to 38C (100.4F), Mild Pain (1 - 3)  dextrose Oral Gel 15 Gram(s) Oral once PRN Blood Glucose LESS THAN 70 milliGRAM(s)/deciliter  melatonin 3 milliGRAM(s) Oral at bedtime PRN Insomnia      Vital Signs Last 24 Hrs  T(C): 36.7 (06 Mar 2023 05:24), Max: 36.7 (06 Mar 2023 05:24)  T(F): 98 (06 Mar 2023 05:24), Max: 98 (06 Mar 2023 05:24)  HR: 91 (06 Mar 2023 05:24) (75 - 96)  BP: 149/77 (06 Mar 2023 05:24) (125/77 - 155/83)  BP(mean): --  RR: 18 (06 Mar 2023 05:24) (18 - 19)  SpO2: 100% (06 Mar 2023 05:24) (100% - 100%)    Parameters below as of 06 Mar 2023 05:24  Patient On (Oxygen Delivery Method): room air      CAPILLARY BLOOD GLUCOSE      POCT Blood Glucose.: 137 mg/dL (05 Mar 2023 21:32)  POCT Blood Glucose.: 126 mg/dL (05 Mar 2023 18:08)  POCT Blood Glucose.: 153 mg/dL (05 Mar 2023 12:10)  POCT Blood Glucose.: 121 mg/dL (05 Mar 2023 08:44)    I&O's Summary      PHYSICAL EXAM:  GENERAL: Chronically ill-appearing, comfortable  HEAD:  Atraumatic, Normocephalic  EYES: EOMI, PERRLA, conjunctiva and sclera clear  NECK: Supple, No JVD  CHEST/LUNG: Rhonchi bilaterally; No wheeze or stridor, no respiratory distress  HEART: Regular rate and rhythm; No murmurs, rubs, or gallops  ABDOMEN: Soft, Nondistended; Bowel sounds present  EXTREMITIES:  2+ Peripheral Pulses, No clubbing, cyanosis, or edema  NEUROLOGY: A&Ox0, responds to some questions appropriately, gargled incoherent speech, +VH  SKIN: scattered ecchymoses and skin tears over b/l arms    LABS:                    RADIOLOGY & ADDITIONAL TESTS:    Imaging Personally Reviewed:    Consultant(s) Notes Reviewed:      Care Discussed with Consultants/Other Providers:

## 2023-03-06 NOTE — PROGRESS NOTE ADULT - SUBJECTIVE AND OBJECTIVE BOX
Follow Up:  1/4 GPC in prs in blood culture 3/1     Interval History/ROS:   witnessed aspiration today   on Ventimask    son at bedside       REVIEW OF SYSTEMS  [ x ] ROS unobtainable because:  lethargic     Allergies  penicillin (Anaphylaxis)        ANTIMICROBIALS:    cefepime   IVPB 1000 every 12 hours  metroNIDAZOLE  IVPB    metroNIDAZOLE  IVPB 500 every 8 hours  vancomycin  IVPB 1000 every 12 hours    MEDICATIONS  (STANDING):  acetaminophen     Tablet .. 650 every 6 hours PRN  albuterol/ipratropium for Nebulization 3 every 4 hours  atorvastatin 80 at bedtime  budesonide  80 MICROgram(s)/formoterol 4.5 MICROgram(s) Inhaler 2 two times a day  carvedilol 3.125 every 12 hours  dextrose 50% Injectable 25 once  dextrose 50% Injectable 12.5 once  dextrose 50% Injectable 25 once  dextrose Oral Gel 15 once PRN  enoxaparin Injectable 40 every 24 hours  glucagon  Injectable 1 once  influenza  Vaccine (HIGH DOSE) 0.7 once  insulin lispro (ADMELOG) corrective regimen sliding scale  three times a day before meals  insulin lispro (ADMELOG) corrective regimen sliding scale  at bedtime  melatonin 3 at bedtime PRN  sodium chloride 3%  Inhalation 4 every 12 hours    Vital Signs Last 24 Hrs  T(F): 97.3 (03-06-23 @ 13:58), Max: 98 (03-06-23 @ 05:24)  HR: 101 (03-06-23 @ 13:58)  BP: 103/63 (03-06-23 @ 13:58)  RR: 20 (03-06-23 @ 13:58)  SpO2: 95% (03-06-23 @ 13:58) (95% - 100%)    EXAM:    Constitutional: lethargic ventimask  LUNGS:  BS bilat  CVS:  s1s2  PPM left   Abd:  soft, non-tender; non-distended  Ext:  b/l skin abrasions, erythema UE  Vascular:    Neuro:  non- focal    Labs:                     MICROBIOLOGY:  Vancomycin Level, Trough: 11.8 (03-05 @ 07:32)    MRSA PCR Result.: NotDetec (03-03-23 @ 19:00)    Culture - Blood (03.04.23 @ 15:17)   Specimen Source: .Blood Blood-Peripheral   Culture Results:   No growth to date.     Culture - Blood (03.04.23 @ 13:21)   Specimen Source: .Blood Blood-Peripheral   Culture Results:   No growth to date.       Culture - Urine (collected 01 Mar 2023 19:59)  Source: Clean Catch None  Final Report:    <10,000 CFU/mL Normal Urogenital Halie    Culture - Blood (collected 01 Mar 2023 19:37)  Source: .Blood None  Preliminary Report:    No growth to date.    Culture - Blood (collected 01 Mar 2023 18:15)  Source: .Blood Blood-Peripheral  Preliminary Report:    Growth in aerobic bottle: Gram positive cocci in pairs    ***Blood Panel PCR results on this specimen are available    approximately 3 hours after the Gram stain result.***    Gram stain, PCR, and/or culture results may not always    correspond due to difference in methodologies.    ************************************************************    This PCR assay was performed by multiplex PCR. This    Assay tests for 66 bacterial and resistance gene targets.    Please refer to the [facility] test directory    at [email or web address] for details.  Organism: Blood Culture PCR  Organism: Blood Culture PCR    Sensitivities:      -  Blood PCR Panel: NEG      Method Type: PCR            Treponema Pallidum Antibody Interpretation: Negative (03-03-23 @ 07:40)              Rapid RVP Result: NotDetec (03-01 @ 18:54)        RADIOLOGY:    CT Chest No Cont:   ACC: [id number] EXAM:  CT CHEST   ORDERED BY: IRENA CORRIGAN     PROCEDURE DATE:  03/03/2023          INTERPRETATION:  EXAMINATION: CT CHEST    CLINICAL INDICATION: Dyspnea.    TECHNIQUE: Noncontrast CT of the chest was obtained.    COMPARISON: 11/22/2022.    FINDINGS:    AIRWAYS AND LUNGS: Tracheobronchial secretions  Bilateral calcified   pleural plaques. Left-sided pleural thickening. Small loculated left   pleural effusion. Partial atelectasis left lower lobe. Clustered and   patchy bilateral upper lobe opacities, greater on the left..    MEDIASTINUM AND PLEURA: There are no enlarged mediastinal, hilar or   axillary lymph nodes. The visualized portion of the thyroid gland is   unremarkable. There is no pneumothorax.    HEART AND VESSELS: There is cardiomegaly.  There are atherosclerotic   calcifications of the aorta and coronary arteries.  There is no   pericardial effusion.  Sternotomy with aortic and mitral valve repair.   Nonunion sternal fragments. Left-sided pacemaker.    UPPERABDOMEN: Images of the upper abdomen demonstrate cholelithiasis.    BONES AND SOFT TISSUES: The bones are unremarkable.  The soft tissues are   unremarkable.    IMPRESSION:  Clustered and patchy bilateral upper lobe opacities, greater on the left   may be infectious or inflammatory.

## 2023-03-06 NOTE — PROGRESS NOTE ADULT - PROBLEM SELECTOR PLAN 1
- currently AOx1-2, at baseline patient is AOx3   - most likely related to a occult infection   - blood cultures from 3/1 show gram positive cocci in pairs  - ID consulted, will f/u   - urine cultures negative   - B12, folate, TSH wnl   - f/u syphilis, RVP negative    Plan:  - currently on vanc, cefepime  [ ] f/u repeat BCx 3/4 for whether GPCs true infection, in which case would need to eval source (and pt has PPM) - currently AOx1-2, at baseline patient is AOx3   - most likely related to a occult infection   - blood cultures from 3/1 show gram positive cocci in pairs  - ID consulted, will f/u   - urine cultures negative   - B12, folate, TSH wnl   - f/u syphilis, RVP negative    Plan:  - currently on vanc, cefepime, +metronidazole given chronic aspiration  - BCx 3/4 NGTD - currently AOx1-2, at baseline patient is AOx3   - most likely related to a occult infection   - blood cultures from 3/1 show gram positive cocci in pairs, repeat 3/4 NGTD  - ID consulted, will f/u   - urine cultures negative   - B12, folate, TSH wnl   - f/u syphilis, RVP negative    Plan:  - currently on vanc, cefepime, +metronidazole given chronic aspiration

## 2023-03-06 NOTE — PROGRESS NOTE ADULT - ASSESSMENT
83M with hypertension, hyperlipidemia, previous stroke, CHF, CAD status post MI and stent, AF (no longer on AC), with PPM, COPD and active smoker who was transferred from [facility] 3/2 with AMS with CTH showing fluid in middle ear and mastoid cavity, ENT evaluation stating low concern for acute otitis or mastoiditis, unclear etiology of AMS, course complicated by BCx GPC in 1 out of 4 bottles, PCR negative. ID consulted for bacteremia.     Patient was AAOx2, poor historian, limited history  Denied cough, abdominal pain, sputum production, dyspnea, n, v, diarrhea, dysuria  Has known PPM  CT aortic and mitral valve repair     WORK UP  UA negative  UCx normal [place]  CT head old frontal infarct, fluid bilat mastoids   CT Chest with patchy bilateral UL opacity  MRSA negative  RVP negative  BCx (3/1) 1 out of 4 GPC, PCR negative    ANTIBIOTIC  azithromycin  IVPB 500 every 24 hours  cefepime   IVPB 1000 daily  vancomycin  IVPB 1000 every 12 hours    DIAGNOSIS and IMPRESSION  #Altered Mental Status  #1 out of 4 GPC in Blood, PCR negative  #Abnormal CT Chest ?Pneumonia    - unclear etiology of AMS  - unclear if GPC is true pathogen vs contaminant    - endocarditis in ddx though seems unlikely with only 1/4 bottles growing unidentified gpc .  repeat blood cultures 3/4 no growth to date   - denies respiratory symptoms, remains afebrile without leukocytosis     RECOMMENDATIONS  - c/w empiric vanco and cefepime   - monitor fever  - trend WBC  - echo

## 2023-03-06 NOTE — PROGRESS NOTE ADULT - PROBLEM SELECTOR PLAN 3
- patient was transferred to [facility] for ENT evaluation of mastoiditis   - ENT stated low concern for acute otitis or mastoiditis at this time  - will continue with debrox drops 5-10 drops BID x 4 days and a saline nasal spray as per ENT recommendations

## 2023-03-06 NOTE — PROGRESS NOTE ADULT - ASSESSMENT
83y male with  hypertension, hyperlipidemia, previous stroke, CHF, CAD status post MI and stent, AF (no longer on AC), with PPM, COPD and active smoker who was transferred from [facility] with AMS for ENT evaluation of mastoiditis. Does not have mastoiditis, but found to have AMS and PNA.

## 2023-03-06 NOTE — PROGRESS NOTE ADULT - PROBLEM SELECTOR PLAN 2
- CT chest without contrast showed clustered and patchy bilateral upper lobe opacities, greater on the left   may be infectious or inflammatory.  - treating with vanc, cefepime  - MRSA neg  - f/u urine strep pneumoniae   - satting well on room air - CT chest without contrast showed clustered and patchy bilateral upper lobe opacities, greater on the left   may be infectious or inflammatory.  - treating with vanc, cefepime, +metronidazole as above  - MRSA neg  - f/u urine strep pneumoniae   - satting well on room air - CT chest without contrast showed clustered and patchy bilateral upper lobe opacities, greater on the left   may be infectious or inflammatory.  - treating with vanc, cefepime, +metronidazole as above  - MRSA neg  - f/u urine strep pneumoniae

## 2023-03-06 NOTE — PROVIDER CONTACT NOTE (CRITICAL VALUE NOTIFICATION) - BACKGROUND
Patient admitted for altered mental status and urinary tract infection.
83 year old male pt admitted with urinary tract infection.

## 2023-03-06 NOTE — SWALLOW VFSS/MBS ASSESSMENT ADULT - COMMENTS
Patient scheduled for cinesophagram this PM. Per Radiology staff, patient sent for twice this AM/PM to come for cinesophagram, however unable to participate at this time. Therefore exam deferred at this time and rescheduled for tomorrow 3/7/23. Medicine Team informed via TEAMS.

## 2023-03-06 NOTE — PROGRESS NOTE ADULT - PROBLEM SELECTOR PLAN 7
Diet: pureed diet, speech recommends cineesophagogram   DVT ppx: lovenox   Dispo: medically active, eventually ANDRÉS Diet: pureed   DVT ppx: lovenox   Dispo: medically active, eventually ANDRÉS    GoC clarified after discussion with patient's adult children, code status: DNR/DNI Diet: NPO given AMS and aspiration  DVT ppx: lovenox   Dispo: medically active, eventually ANDRÉS    GoC clarified after discussion with patient's adult children, code status: DNR/DNI

## 2023-03-06 NOTE — PROVIDER CONTACT NOTE (OTHER) - BACKGROUND
Patient admitted for altered mental status and UTI.
Patient admitted for altered mental status and UTI.

## 2023-03-07 NOTE — PROGRESS NOTE ADULT - PROBLEM SELECTOR PLAN 3
- patient was transferred to [facility] for ENT evaluation of mastoiditis   - ENT stated low concern for acute otitis or mastoiditis at this time  - will continue with debrox drops 5-10 drops BID x 4 days and a saline nasal spray as per ENT recommendations

## 2023-03-07 NOTE — PROGRESS NOTE ADULT - PROBLEM SELECTOR PLAN 1
- currently AOx1-2, at baseline patient is AOx3   - most likely related to a occult infection   - blood cultures from 3/1 show gram positive cocci in pairs, repeat 3/4 NGTD  - ID consulted, will f/u   - urine cultures negative   - B12, folate, TSH wnl   - f/u syphilis, RVP negative    Plan:  - currently on vanc, cefepime, +metronidazole given chronic aspiration

## 2023-03-07 NOTE — CONSULT NOTE ADULT - ASSESSMENT
83y male with  hypertension, hyperlipidemia, previous stroke, CHF, CAD status post MI and stent, AF (no longer on AC), with PPM, COPD and active smoker who was transferred from [facility] with AMS for ENT evaluation of mastoiditis but found not have mastoiditis. Found to have acute hypoxic and hypercapnic respiratory failure 2/2 recurrent aspiration pneumonia  Palliative Care consulted for symptom management recommendations for comfort measures

## 2023-03-07 NOTE — PROGRESS NOTE ADULT - PROBLEM SELECTOR PLAN 7
Diet: NPO given AMS and aspiration  DVT ppx: lovenox   Dispo: medically active, eventually ANDRÉS    GoC clarified after discussion with patient's adult children, code status: DNR/DNI

## 2023-03-07 NOTE — PROGRESS NOTE ADULT - PROBLEM SELECTOR PLAN 2
- CT chest without contrast showed clustered and patchy bilateral upper lobe opacities, greater on the left   may be infectious or inflammatory.  - treating with vanc, cefepime, +metronidazole as above  - MRSA neg  - f/u urine strep pneumoniae

## 2023-03-07 NOTE — PROGRESS NOTE ADULT - SUBJECTIVE AND OBJECTIVE BOX
DATE OF SERVICE: 03-07-23 @ 06:38    Patient is a 83y old  Male who presents with a chief complaint of AMS, r/o mastoiditis (06 Mar 2023 16:45)      SUBJECTIVE / OVERNIGHT EVENTS:  No acute events overnight, patient reports doing well and all questions answered at this time.     Additional Review of Systems:  Denies any other acute sx including f/c, HA, cough, sore throat, eye/ear changes, rhinorrhea, CP, palpitations, SOB, n/v, abdominal pain, back pain, bowel/bladder changes, fatigue, numbness or tingling.    MEDICATIONS  (STANDING):  albuterol/ipratropium for Nebulization 3 milliLiter(s) Nebulizer every 4 hours  budesonide  80 MICROgram(s)/formoterol 4.5 MICROgram(s) Inhaler 2 Puff(s) Inhalation two times a day  cefepime   IVPB 1000 milliGRAM(s) IV Intermittent every 12 hours  influenza  Vaccine (HIGH DOSE) 0.7 milliLiter(s) IntraMuscular once  metroNIDAZOLE  IVPB      metroNIDAZOLE  IVPB 500 milliGRAM(s) IV Intermittent every 8 hours  nicotine -   7 mG/24Hr(s) Patch 1 Patch Transdermal every 24 hours  sodium chloride 0.65% Nasal 1 Spray(s) Both Nostrils two times a day  sodium chloride 3%  Inhalation 4 milliLiter(s) Inhalation every 12 hours  vancomycin  IVPB 1000 milliGRAM(s) IV Intermittent every 12 hours    MEDICATIONS  (PRN):      Vital Signs Last 24 Hrs  T(C): 36.9 (07 Mar 2023 05:18), Max: 36.9 (07 Mar 2023 05:18)  T(F): 98.5 (07 Mar 2023 05:18), Max: 98.5 (07 Mar 2023 05:18)  HR: 97 (07 Mar 2023 05:18) (97 - 112)  BP: 144/63 (07 Mar 2023 05:18) (103/63 - 150/101)  BP(mean): --  RR: 20 (07 Mar 2023 05:18) (20 - 20)  SpO2: 93% (07 Mar 2023 05:18) (91% - 100%)    Parameters below as of 07 Mar 2023 05:18  Patient On (Oxygen Delivery Method): nasal cannula,2      CAPILLARY BLOOD GLUCOSE      POCT Blood Glucose.: 161 mg/dL (06 Mar 2023 13:06)  POCT Blood Glucose.: 138 mg/dL (06 Mar 2023 08:36)    I&O's Summary    PHYSICAL EXAM:  GENERAL: Chronically ill-appearing, comfortable  HEAD:  Atraumatic, Normocephalic  EYES: EOMI, PERRLA, conjunctiva and sclera clear  NECK: Supple, No JVD  CHEST/LUNG: Rhonchi bilaterally; No wheeze or stridor, no respiratory distress  HEART: Regular rate and rhythm; No murmurs, rubs, or gallops  ABDOMEN: Soft, Nondistended; Bowel sounds present  EXTREMITIES:  2+ Peripheral Pulses, No clubbing, cyanosis, or edema  NEUROLOGY: A&Ox0, responds to some questions appropriately, gargled incoherent speech, +VH  SKIN: scattered ecchymoses and skin tears over b/l arms    LABS:                    RADIOLOGY & ADDITIONAL TESTS:    Imaging Personally Reviewed:    Consultant(s) Notes Reviewed:      Care Discussed with Consultants/Other Providers:
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
## 2023-03-07 NOTE — PROGRESS NOTE ADULT - ASSESSMENT
83y male with  hypertension, hyperlipidemia, previous stroke, CHF, CAD status post MI and stent, AF (no longer on AC), with PPM, COPD and active smoker who was transferred from [facility] with AMS for ENT evaluation of mastoiditis. Does not have mastoiditis, but found to have AMS and PNA.

## 2023-03-07 NOTE — CONSULT NOTE ADULT - PROBLEM SELECTOR RECOMMENDATION 4
- CT Chest -Clustered and patchy bilateral upper lobe opacities, greater on the left may be infectious or inflammatory.  - on flagyl and cefepime  - management as per primary team

## 2023-03-07 NOTE — CONSULT NOTE ADULT - PROBLEM SELECTOR RECOMMENDATION 9
- IV Dilaudid 0.2mg q1 PRN pain/dyspnea; if inadequate relief, can increase to IV Dilaudid 0.5mg PRN  - IV Ativan 0.2mg q1 PRN anxiety/agitation/ refractory dyspnea  - IV Robinul 0.4mg q6 PRN secretions  - Bowel regimen while on opiates: Dulcolax Supp PRN

## 2023-03-07 NOTE — CONSULT NOTE ADULT - PROBLEM SELECTOR RECOMMENDATION 5
Patient is DNR/DNI. Comfort Measures.    Reached out to daughter Sedrick Fair today who confirms that family wishes for comfort measures. She states family awaiting for patient's son Eddy to fly in from [place] today prior to initiation of symptom medications for comfort as they do not want to "expedite" his passing before son is able to see him. Reassured daughter that the role of these medications is not to "expedite" the dying process but rather for symptom management but does have a double effect; daughter verbalized understanding. She wishes to hold off on symptom meds until her brother arrives, but also shares that if patient becomes symptomatic (before Eddy arrives) family also does not want patient to suffer and may reconsider starting medications to ensure patient is not in distress.   Emotional support provided.    Case reviewed with primary team.   Thank you for allowing us to participate in your patient's care. Please page [phone] for any questions/concerns.

## 2023-03-08 NOTE — PROGRESS NOTE ADULT - PROBLEM SELECTOR PLAN 1
- currently AOx1-2, at baseline patient is AOx3   - most likely related to a occult infection   - blood cultures from 3/1 show gram positive cocci in pairs, repeat 3/4 NGTD  - ID consulted, will f/u   - urine cultures negative   - B12, folate, TSH wnl   - f/u syphilis, RVP negative    Plan:  - currently on vanc, cefepime, +metronidazole given chronic aspiration - currently AOx0  - most likely related to recurrent aspiration event  - blood cultures from 3/1 show gram positive cocci in pairs in 1/4 bottles, all repeat Cx NGTD  - ID on board, appreciate recs  - urine cultures negative     Plan:  - s/p vanc, cefepime, transitioned to CTX to finish 7d course 3/10, +metronidazole

## 2023-03-08 NOTE — PROGRESS NOTE ADULT - PROBLEM SELECTOR PLAN 6
- asymptomatic, patient saturating well on room air and uses albuterol at home as needed  - will provide albuterol inhaler prn q6h - titrate supplemental oxygen to 88-93% to avoid blunting of respiratory drive

## 2023-03-08 NOTE — PROGRESS NOTE ADULT - SUBJECTIVE AND OBJECTIVE BOX
DATE OF SERVICE: 03-08-23 @ 06:38    Patient is a 83y old  Male who presents with a chief complaint of AMS, r/o mastoiditis (07 Mar 2023 13:52)      SUBJECTIVE / OVERNIGHT EVENTS:  No acute events overnight, patient reports doing well and all questions answered at this time.     Additional Review of Systems:  Denies any other acute sx including f/c, HA, cough, sore throat, eye/ear changes, rhinorrhea, CP, palpitations, SOB, n/v, abdominal pain, back pain, bowel/bladder changes, fatigue, numbness or tingling.    MEDICATIONS  (STANDING):  cefepime   IVPB 1000 milliGRAM(s) IV Intermittent every 12 hours  influenza  Vaccine (HIGH DOSE) 0.7 milliLiter(s) IntraMuscular once  metroNIDAZOLE  IVPB      metroNIDAZOLE  IVPB 500 milliGRAM(s) IV Intermittent every 8 hours    MEDICATIONS  (PRN):  glycopyrrolate Injectable 0.4 milliGRAM(s) IV Push every 6 hours PRN secretions  HYDROmorphone  Injectable 0.2 milliGRAM(s) IV Push every 1 hour PRN pain or dyspnea  LORazepam   Injectable 0.2 milliGRAM(s) IV Push every 1 hour PRN Dyspnea      Vital Signs Last 24 Hrs  T(C): 37 (07 Mar 2023 21:51), Max: 37 (07 Mar 2023 21:51)  T(F): 98.6 (07 Mar 2023 21:51), Max: 98.6 (07 Mar 2023 21:51)  HR: 68 (07 Mar 2023 21:51) (68 - 68)  BP: 113/43 (07 Mar 2023 21:51) (113/43 - 113/43)  BP(mean): --  RR: 18 (07 Mar 2023 21:51) (18 - 18)  SpO2: 95% (07 Mar 2023 21:51) (95% - 95%)    Parameters below as of 07 Mar 2023 21:51  Patient On (Oxygen Delivery Method): nasal cannula,2      CAPILLARY BLOOD GLUCOSE        I&O's Summary      PHYSICAL EXAM:  GENERAL: Clinically well-appearing and comfortable  HEAD:  Atraumatic, Normocephalic  EYES: EOMI, PERRLA, conjunctiva and sclera clear  NECK: Supple, No JVD  CHEST/LUNG: Clear to auscultation bilaterally; No wheeze  HEART: Regular rate and rhythm; No murmurs, rubs, or gallops  ABDOMEN: Soft, Nontender, Nondistended; Bowel sounds present  EXTREMITIES:  2+ Peripheral Pulses, No clubbing, cyanosis, or edema  PSYCH: Normal mood, Normal affect  NEUROLOGY: non-focal  SKIN: No rashes or lesions    LABS:                    RADIOLOGY & ADDITIONAL TESTS:    Imaging Personally Reviewed:    Consultant(s) Notes Reviewed:      Care Discussed with Consultants/Other Providers:   DATE OF SERVICE: 03-08-23 @ 06:38    Patient is a 83y old  Male who presents with a chief complaint of AMS, r/o mastoiditis (07 Mar 2023 13:52)      SUBJECTIVE / OVERNIGHT EVENTS: Limited 2/2 mental status  No acute events overnight, patient seen and examined.    Additional Review of Systems: Unable to perform 2/2 mental status    MEDICATIONS  (STANDING):  cefepime   IVPB 1000 milliGRAM(s) IV Intermittent every 12 hours  influenza  Vaccine (HIGH DOSE) 0.7 milliLiter(s) IntraMuscular once  metroNIDAZOLE  IVPB      metroNIDAZOLE  IVPB 500 milliGRAM(s) IV Intermittent every 8 hours    MEDICATIONS  (PRN):  glycopyrrolate Injectable 0.4 milliGRAM(s) IV Push every 6 hours PRN secretions  HYDROmorphone  Injectable 0.2 milliGRAM(s) IV Push every 1 hour PRN pain or dyspnea  LORazepam   Injectable 0.2 milliGRAM(s) IV Push every 1 hour PRN Dyspnea      Vital Signs Last 24 Hrs  T(C): 37 (07 Mar 2023 21:51), Max: 37 (07 Mar 2023 21:51)  T(F): 98.6 (07 Mar 2023 21:51), Max: 98.6 (07 Mar 2023 21:51)  HR: 68 (07 Mar 2023 21:51) (68 - 68)  BP: 113/43 (07 Mar 2023 21:51) (113/43 - 113/43)  BP(mean): --  RR: 18 (07 Mar 2023 21:51) (18 - 18)  SpO2: 95% (07 Mar 2023 21:51) (95% - 95%)    Parameters below as of 07 Mar 2023 21:51  Patient On (Oxygen Delivery Method): nasal cannula,2      CAPILLARY BLOOD GLUCOSE        I&O's Summary    PHYSICAL EXAM:  GENERAL: Chronically ill-appearing, comfortably sleeping  HEAD:  Atraumatic, Normocephalic  EYES: EOMI, PERRLA, conjunctiva and sclera clear  NECK: Supple, No JVD  CHEST/LUNG: clear to auscultation bilaterally; No wheeze or stridor, no respiratory distress  HEART: Regular rate and rhythm; No murmurs, rubs, or gallops  ABDOMEN: Soft, Nondistended; Bowel sounds present  EXTREMITIES:  2+ Peripheral Pulses, No clubbing, cyanosis, or edema  NEUROLOGY: A&Ox0, grimaces to noxious stimuli  SKIN: scattered ecchymoses and skin tears over b/l arms      LABS:                    RADIOLOGY & ADDITIONAL TESTS:    Imaging Personally Reviewed:    Consultant(s) Notes Reviewed:      Care Discussed with Consultants/Other Providers:

## 2023-03-08 NOTE — PROGRESS NOTE ADULT - ASSESSMENT
83y male with  hypertension, hyperlipidemia, previous stroke, CHF, CAD status post MI and stent, AF (no longer on AC), with PPM, COPD and active smoker who was transferred from [facility] with AMS for ENT evaluation of mastoiditis. Does not have mastoiditis, but found to have AMS and PNA. 83y male with  hypertension, hyperlipidemia, previous stroke, CHF, CAD status post MI and stent, AF (no longer on AC), with PPM, COPD and active smoker who was transferred from [facility] with AMS for ENT evaluation of mastoiditis. Does not have mastoiditis, but found to have AMS and aspiration PNA.

## 2023-03-08 NOTE — PROGRESS NOTE ADULT - PROBLEM SELECTOR PLAN 7
Diet: NPO given AMS and aspiration  DVT ppx: lovenox   Dispo: medically active, eventually [facility]    GoC clarified after discussion with patient's adult children, code status: DNR/DNI Diet: NPO given AMS and aspiration  DVT ppx: lovenox   Dispo: not a candidate for inpatient hospice    GoC clarified after discussion with patient's adult children, code status: DNR/DNI
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
## 2023-03-08 NOTE — PROGRESS NOTE ADULT - PROBLEM SELECTOR PLAN 3
- patient was transferred to [facility] for ENT evaluation of mastoiditis   - ENT stated low concern for acute otitis or mastoiditis at this time  - will continue with debrox drops 5-10 drops BID x 4 days and a saline nasal spray as per ENT recommendations

## 2023-03-08 NOTE — PROGRESS NOTE ADULT - ASSESSMENT
83M with hypertension, hyperlipidemia, previous stroke, CHF, CAD status post MI and stent, AF (no longer on AC), with PPM, COPD and active smoker who was transferred from [facility] 3/2 with AMS with CTH showing fluid in middle ear and mastoid cavity, ENT evaluation stating low concern for acute otitis or mastoiditis, unclear etiology of AMS, course complicated by BCx GPC in 1 out of 4 bottles, PCR negative. ID consulted for bacteremia.     Patient was AAOx2, poor historian, limited history on admission   Denied cough, abdominal pain, sputum production, dyspnea, n, v, diarrhea, dysuria  Has known PPM  CT aortic and mitral valve repair     WORK UP  UA negative  UCx normal [place]  CT head old frontal infarct, fluid bilat mastoids   CT Chest with patchy bilateral UL opacity  MRSA negative  RVP negative  BCx (3/1) 1 out of 4 GPC, PCR negative    ANTIBIOTIC  azithromycin  3/3- 3/4  cefepime  3/3- 3/7  vancomycin  3/3- 3/6  flagyl 3/6-  ceftriaxone 3/8-     DIAGNOSIS and IMPRESSION  #Altered Mental Status  #1 out of 4 GPC in Blood, PCR negative  sent to [facility] for identification ?strep   #Abnormal CT Chest ?Pneumonia  possible aspiration       - unclear if GPC is true pathogen vs contaminant    - endocarditis in ddx though seems unlikely with only 1/4 bottles growing unidentified gpc .  repeat blood cultures 3/4 and 3/6 no growth to date     - comfort measures only now   - vanco stopped due to inability to monitor  levels       RECOMMENDATIONS  - c/w flagyl and ceftriaxone --> 3/10 to complete 7 days total

## 2023-03-08 NOTE — PROGRESS NOTE ADULT - PROBLEM SELECTOR PLAN 4
Son Stalin at bedside. Educated patient/family about EOL in terms of what to expect.  Questions answered.  Emotional support provided.  Chaplaincy referral offered; son declined.     Case reviewed with primary team.   Thank you for allowing us to participate in your patient's care. Please page [phone] for any questions/concerns.

## 2023-03-08 NOTE — PROGRESS NOTE ADULT - PROBLEM SELECTOR PLAN 1
- Discussed with bedside RN who reports patient with adequate relief with current dose of IV Dilaudid 0.2mg.   - IV Dilaudid 0.2mg q1 PRN pain/dyspnea; if inadequate relief, can increase to IV Dilaudid 0.5mg PRN  - IV Ativan 0.2mg q1 PRN anxiety/agitation/ refractory dyspnea  - IV Robinul 0.4mg q6 PRN secretions  - Bowel regimen while on opiates: Dulcolax Supp PRN.

## 2023-03-08 NOTE — PROGRESS NOTE ADULT - PROBLEM SELECTOR PLAN 4
- stable, history of CABG in 2018  - will hold atorvastatin. lisinopril, and carvedilol until formal swallow eval completed  - resumed atorvastatin  - will restart carvedilol at 3.125mg BID, and uptitrate as needed   - will hold lisinopril in setting on infection - stable, history of CABG in 2018  - d/c atorvastatin after comfort measures  - will hold lisinopril in setting of infection

## 2023-03-08 NOTE — PROGRESS NOTE ADULT - PROBLEM SELECTOR PLAN 2
- CT chest without contrast showed clustered and patchy bilateral upper lobe opacities, greater on the left   may be infectious or inflammatory.  - treating with vanc, cefepime, +metronidazole as above  - MRSA neg  - f/u urine strep pneumoniae - treating with CTX+metronidazole through 3/10  - MRSA neg

## 2023-03-08 NOTE — PROGRESS NOTE ADULT - ASSESSMENT
83y male with  hypertension, hyperlipidemia, previous stroke, CHF, CAD status post MI and stent, AF (no longer on AC), with PPM, COPD and active smoker who was transferred from [facility] with AMS for ENT evaluation of mastoiditis but found not have mastoiditis. Found to have acute hypoxic and hypercapnic respiratory failure 2/2 recurrent aspiration pneumonia  Palliative Care consulted for symptom management recommendations for comfort measures

## 2023-03-08 NOTE — PROGRESS NOTE ADULT - SUBJECTIVE AND OBJECTIVE BOX
Follow Up:  1/4 GPC in prs in blood culture 3/1     Interval History/ROS:   afebrile  comfort measure now   family at bedside        REVIEW OF SYSTEMS  [ x ] ROS unobtainable because:  lethargic     Allergies  penicillin (Anaphylaxis)        ANTIMICROBIALS:    cefTRIAXone   IVPB 1000 every 24 hours  metroNIDAZOLE  IVPB    metroNIDAZOLE  IVPB 500 every 8 hours    MEDICATIONS  (STANDING):  glycopyrrolate Injectable 0.4 every 6 hours PRN  HYDROmorphone  Injectable 0.2 every 1 hour PRN  influenza  Vaccine (HIGH DOSE) 0.7 once  LORazepam   Injectable 0.2 every 1 hour PRN    Vital Signs Last 24 Hrs  T(F): 98.6 (03-07-23 @ 21:51), Max: 98.6 (03-07-23 @ 21:51)  HR: 68 (03-07-23 @ 21:51)  BP: 113/43 (03-07-23 @ 21:51)  RR: 18 (03-07-23 @ 21:51)  SpO2: 95% (03-07-23 @ 21:51) (95% - 95%)    EXAM:    Constitutional: lethargic  nasal O2  minimal response   CVS:  s1s2  PPM left   no respiratory distress   Ext:  b/l skin abrasions, erythema UE      Labs:             none recent         MICROBIOLOGY:      MRSA PCR Result.: NotDetec (03-03-23 @ 19:00)    blood culture 3/6 no growth to date     Culture - Blood (03.04.23 @ 15:17)   Specimen Source: .Blood Blood-Peripheral   Culture Results:   No growth to date.     Culture - Blood (03.04.23 @ 13:21)   Specimen Source: .Blood Blood-Peripheral   Culture Results:   No growth to date.       Culture - Urine (collected 01 Mar 2023 19:59)  Source: Clean Catch None  Final Report:    <10,000 CFU/mL Normal Urogenital Halie    Culture - Blood (collected 01 Mar 2023 19:37)  Source: .Blood None  Preliminary Report:    No growth to date.    Culture - Blood (collected 01 Mar 2023 18:15)  Source: .Blood Blood-Peripheral  Preliminary Report:    Growth in aerobic bottle: Gram positive cocci in pairs    ***Blood Panel PCR results on this specimen are available    approximately 3 hours after the Gram stain result.***    Gram stain, PCR, and/or culture results may not always    correspond due to difference in methodologies.    ************************************************************    This PCR assay was performed by multiplex PCR. This    Assay tests for 66 bacterial and resistance gene targets.    Please refer to the [facility] test directory    at [email or web address] for details.  Organism: Blood Culture PCR  Organism: Blood Culture PCR    Sensitivities:      -  Blood PCR Panel: NEG      Method Type: PCR    Culture Results:   Growth in aerobic bottle: Gram positive cocci in pairs   Sent to   [place] [facility] for Identification       Rapid RVP Result: NotDetec (03-01 @ 18:54)        RADIOLOGY:    CT Chest No Cont:   ACC: [id number] EXAM:  CT CHEST   ORDERED BY: IRENA CORRIGAN     PROCEDURE DATE:  03/03/2023          INTERPRETATION:  EXAMINATION: CT CHEST    CLINICAL INDICATION: Dyspnea.    TECHNIQUE: Noncontrast CT of the chest was obtained.    COMPARISON: 11/22/2022.    FINDINGS:    AIRWAYS AND LUNGS: Tracheobronchial secretions  Bilateral calcified   pleural plaques. Left-sided pleural thickening. Small loculated left   pleural effusion. Partial atelectasis left lower lobe. Clustered and   patchy bilateral upper lobe opacities, greater on the left..    MEDIASTINUM AND PLEURA: There are no enlarged mediastinal, hilar or   axillary lymph nodes. The visualized portion of the thyroid gland is   unremarkable. There is no pneumothorax.    HEART AND VESSELS: There is cardiomegaly.  There are atherosclerotic   calcifications of the aorta and coronary arteries.  There is no   pericardial effusion.  Sternotomy with aortic and mitral valve repair.   Nonunion sternal fragments. Left-sided pacemaker.    UPPERABDOMEN: Images of the upper abdomen demonstrate cholelithiasis.    BONES AND SOFT TISSUES: The bones are unremarkable.  The soft tissues are   unremarkable.    IMPRESSION:  Clustered and patchy bilateral upper lobe opacities, greater on the left   may be infectious or inflammatory.

## 2023-03-09 NOTE — CHART NOTE - NSCHARTNOTEFT_GEN_A_CORE
Source: Patient [ ]    Family [ ]     other [ x] chart review    Nutrition f/u. 83 year old male with a PMH of hypertension, hyperlipidemia, previous stroke, CHF, CAD, AF, COPD and active smoker who was transferred from [facility] with AMS for ENT evaluation of mastoiditis. Does not have mastoiditis, but found to have AMS and aspiration PNA per chart.    Patient currently NPO for AMS/aspiration and on comfort care per chart. No GI distress noted. No edema or pressure injuries noted per RN flow sheet.    Diet : Diet, NPO (03-06-23 @ 10:09)    Current Weight: no new weight to assess  67 kg (3/2)    Pertinent Medications: MEDICATIONS  (STANDING):  cefTRIAXone   IVPB 1000 milliGRAM(s) IV Intermittent every 24 hours  influenza  Vaccine (HIGH DOSE) 0.7 milliLiter(s) IntraMuscular once  metroNIDAZOLE  IVPB      metroNIDAZOLE  IVPB 500 milliGRAM(s) IV Intermittent every 8 hours    MEDICATIONS  (PRN):  glycopyrrolate Injectable 0.4 milliGRAM(s) IV Push every 6 hours PRN secretions  HYDROmorphone  Injectable 0.2 milliGRAM(s) IV Push every 1 hour PRN pain or dyspnea  LORazepam   Injectable 0.2 milliGRAM(s) IV Push every 1 hour PRN Dyspnea    Pertinent Labs: no new labs to assess    Estimated Needs: [ x] no change since previous assessment    Previous Nutrition Diagnosis: Increased nutrient needs    Nutrition Diagnosis is [x ] ongoing  [ ] resolved [ ] not applicable     - defer nutrition POC to MD  - nutrition remains available for reconsult as needed

## 2023-03-09 NOTE — PROGRESS NOTE ADULT - SUBJECTIVE AND OBJECTIVE BOX
DATE OF SERVICE: 03-09-23 @ 07:04    Patient is a 83y old  Male who presents with a chief complaint of AMS, r/o mastoiditis (08 Mar 2023 19:04)      SUBJECTIVE / OVERNIGHT EVENTS:  No acute events overnight, patient reports doing well and all questions answered at this time.     Additional Review of Systems:  Denies any other acute sx including f/c, HA, cough, sore throat, eye/ear changes, rhinorrhea, CP, palpitations, SOB, n/v, abdominal pain, back pain, bowel/bladder changes, fatigue, numbness or tingling.    MEDICATIONS  (STANDING):  cefTRIAXone   IVPB 1000 milliGRAM(s) IV Intermittent every 24 hours  influenza  Vaccine (HIGH DOSE) 0.7 milliLiter(s) IntraMuscular once  metroNIDAZOLE  IVPB      metroNIDAZOLE  IVPB 500 milliGRAM(s) IV Intermittent every 8 hours    MEDICATIONS  (PRN):  glycopyrrolate Injectable 0.4 milliGRAM(s) IV Push every 6 hours PRN secretions  HYDROmorphone  Injectable 0.2 milliGRAM(s) IV Push every 1 hour PRN pain or dyspnea  LORazepam   Injectable 0.2 milliGRAM(s) IV Push every 1 hour PRN Dyspnea      Vital Signs Last 24 Hrs  T(C): --  T(F): --  HR: --  BP: --  BP(mean): --  RR: --  SpO2: --    Parameters below as of 08 Mar 2023 07:54  Patient On (Oxygen Delivery Method): nasal cannula,2      CAPILLARY BLOOD GLUCOSE        I&O's Summary      PHYSICAL EXAM:  GENERAL: Clinically well-appearing and comfortable  HEAD:  Atraumatic, Normocephalic  EYES: EOMI, PERRLA, conjunctiva and sclera clear  NECK: Supple, No JVD  CHEST/LUNG: Clear to auscultation bilaterally; No wheeze  HEART: Regular rate and rhythm; No murmurs, rubs, or gallops  ABDOMEN: Soft, Nontender, Nondistended; Bowel sounds present  EXTREMITIES:  2+ Peripheral Pulses, No clubbing, cyanosis, or edema  PSYCH: Normal mood, Normal affect  NEUROLOGY: non-focal  SKIN: No rashes or lesions    LABS:                    RADIOLOGY & ADDITIONAL TESTS:    Imaging Personally Reviewed:    Consultant(s) Notes Reviewed:      Care Discussed with Consultants/Other Providers:   DATE OF SERVICE: 03-09-23 @ 07:04    Patient is a 83y old  Male who presents with a chief complaint of AMS, r/o mastoiditis (08 Mar 2023 19:04)      SUBJECTIVE / OVERNIGHT EVENTS: Limited 2/2 AMS  No events overnight.    Additional Review of Systems:  Unable to perform 2/2 AMS    MEDICATIONS  (STANDING):  cefTRIAXone   IVPB 1000 milliGRAM(s) IV Intermittent every 24 hours  influenza  Vaccine (HIGH DOSE) 0.7 milliLiter(s) IntraMuscular once  metroNIDAZOLE  IVPB      metroNIDAZOLE  IVPB 500 milliGRAM(s) IV Intermittent every 8 hours    MEDICATIONS  (PRN):  glycopyrrolate Injectable 0.4 milliGRAM(s) IV Push every 6 hours PRN secretions  HYDROmorphone  Injectable 0.2 milliGRAM(s) IV Push every 1 hour PRN pain or dyspnea  LORazepam   Injectable 0.2 milliGRAM(s) IV Push every 1 hour PRN Dyspnea      Vital Signs Last 24 Hrs  T(C): --  T(F): --  HR: --  BP: --  BP(mean): --  RR: --  SpO2: --    Parameters below as of 08 Mar 2023 07:54  Patient On (Oxygen Delivery Method): nasal cannula,2      CAPILLARY BLOOD GLUCOSE        I&O's Summary    PHYSICAL EXAM:  GENERAL: Chronically ill-appearing, comfortably sleeping  HEAD:  Atraumatic, Normocephalic  EYES: EOMI, PERRLA, conjunctiva and sclera clear  NECK: Supple, No JVD  CHEST/LUNG: clear to auscultation bilaterally; No wheeze or stridor, no respiratory distress  HEART: Regular rate and rhythm; No murmurs, rubs, or gallops  ABDOMEN: Soft, Nondistended; Bowel sounds present  EXTREMITIES:  2+ Peripheral Pulses, No clubbing, cyanosis, or edema  NEUROLOGY: A&Ox0, grimaces to noxious stimuli  SKIN: scattered ecchymoses and skin tears over b/l arms    LABS:                    RADIOLOGY & ADDITIONAL TESTS:    Imaging Personally Reviewed:    Consultant(s) Notes Reviewed:      Care Discussed with Consultants/Other Providers:
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
## 2023-03-09 NOTE — PROGRESS NOTE ADULT - ASSESSMENT
83y male with  hypertension, hyperlipidemia, previous stroke, CHF, CAD status post MI and stent, AF (no longer on AC), with PPM, COPD and active smoker who was transferred from [facility] with AMS for ENT evaluation of mastoiditis. Does not have mastoiditis, but found to have AMS and aspiration PNA.

## 2023-03-09 NOTE — PROGRESS NOTE ADULT - ASSESSMENT
83y male with  hypertension, hyperlipidemia, previous stroke, CHF, CAD status post MI and stent, AF (no longer on AC), with PPM, COPD and active smoker who was transferred from  with AMS for ENT evaluation of mastoiditis but found not have mastoiditis. Found to have acute hypoxic and hypercapnic respiratory failure 2/2 recurrent aspiration pneumonia  Palliative Care consulted for symptom management recommendations for comfort measures

## 2023-03-09 NOTE — PROGRESS NOTE ADULT - PROBLEM SELECTOR PLAN 1
- Increase to IV Dilaudid 0.5mg q1 PRN pain/dyspnea  - IV Ativan 0.2mg q1 PRN anxiety/agitation/ refractory dyspnea  - IV Robinul 0.4mg q6 PRN secretions  - Bowel regimen while on opiates: Dulcolax Supp PRN.

## 2023-03-09 NOTE — PROGRESS NOTE ADULT - PROBLEM SELECTOR PLAN 4
- stable, history of CABG in 2018  - d/c atorvastatin after comfort measures  - will hold lisinopril in setting of infection - stable, history of CABG in 2018  - d/c atorvastatin and lisinopril after comfort measures

## 2023-03-09 NOTE — PROGRESS NOTE ADULT - PROBLEM SELECTOR PLAN 4
Seth Gant at bedside. Support provided.    Case reviewed with primary team and bedside RN.  Thank you for allowing us to participate in your patient's care. Please page [phone] for any questions/concerns.

## 2023-03-09 NOTE — PROGRESS NOTE ADULT - PROBLEM SELECTOR PLAN 1
- currently AOx0  - most likely related to recurrent aspiration event  - blood cultures from 3/1 show gram positive cocci in pairs in 1/4 bottles, all repeat Cx NGTD  - ID on board, appreciate recs  - urine cultures negative     Plan:  - s/p vanc, cefepime, transitioned to CTX to finish 7d course 3/10, +metronidazole

## 2023-03-09 NOTE — PROGRESS NOTE ADULT - PROBLEM SELECTOR PLAN 7
Diet: NPO given AMS and aspiration  DVT ppx: lovenox   Dispo: not a candidate for inpatient hospice    GoC clarified after discussion with patient's adult children, code status: DNR/DNI

## 2023-03-09 NOTE — PROGRESS NOTE ADULT - PROBLEM SELECTOR PLAN 5
- s/p ppm, currently on rate control with carvedilol   - as per last cardiology note no longer on ac - s/p ppm, currently on rate control with carvedilol, not on AC

## 2023-03-10 NOTE — PROGRESS NOTE ADULT - REASON FOR ADMISSION
AMS
AMS, r/o mastoiditis

## 2023-03-10 NOTE — PROGRESS NOTE ADULT - PROBLEM SELECTOR PROBLEM 1
AMS (altered mental status)
Dyspnea
AMS (altered mental status)
Dyspnea
Dyspnea

## 2023-03-10 NOTE — CHART NOTE - NSCHARTNOTEFT_GEN_A_CORE
Patient day #10 antibiotics   Called micro lab to discuss 3/1 blood culture gpc in 1/4 bottles which could not be identified by MALDI.  It was sent to [facility] for identification - results not back yet.  Please follow final report and reach out to ID service if questions  I will be away --> 3/20

## 2023-03-10 NOTE — PROGRESS NOTE ADULT - PROBLEM SELECTOR PROBLEM 3
Pt calling, says we were supposed to call in her insulin but she got pens instead of the normal tubes she usually gets. She doesn't know who to use the pens. Wants the normal med called in for her. Pneumonia

## 2023-03-10 NOTE — PROGRESS NOTE ADULT - PROBLEM/PLAN-4
DISPLAY PLAN FREE TEXT
No

## 2023-03-10 NOTE — PROGRESS NOTE ADULT - ASSESSMENT
83y male with  hypertension, hyperlipidemia, previous stroke, CHF, CAD status post MI and stent, AF (no longer on AC), with PPM, COPD and active smoker who was transferred from [facility] with AMS for ENT evaluation of mastoiditis but found not have mastoiditis. Found to have acute hypoxic and hypercapnic respiratory failure 2/2 recurrent aspiration pneumonia  Palliative Care consulted for symptom management recommendations for comfort measures

## 2023-03-10 NOTE — PROGRESS NOTE ADULT - PROVIDER SPECIALTY LIST ADULT
Internal Medicine
Infectious Disease
Palliative Care
Internal Medicine
Palliative Care
Palliative Care
Internal Medicine

## 2023-03-10 NOTE — PROGRESS NOTE ADULT - ATTENDING COMMENTS
AMS   pulmonary infiltrates ?aspiration   bacteremia gram positive cocci in prs not identified by PCR  endocarditis in ddx with aortic and mitral valve repair, PPM    would continue vanco and cefepime   follow final blood culture identification  echo
83y male with  hypertension, hyperlipidemia, previous stroke, CHF, CAD status post MI and stent, AF (no longer on AC), with PPM, COPD and active smoker who was transferred from [facility] with AMS for ENT evaluation of mastoiditis bot found not have mastoiditis. Found to have acute hypoxic and hypercapnic respiratory failure 2/2 recurrent aspiration pneumonia    Over the weekend 3/4-3/5, the patient continued to decline as per son at bedside. Noted with thick, gurgling secretions with desaturation to low 80s/70s on NC, respiratory distress, waxing/waning level of consciousness, not oriented to person, place, time.     This morning patient is unarousable to tactile stimuli, and is increasingly trending toward bradypnea.    See GOC from 3/6. Family at this time wishes to pursue comfort measures.     Continue cefepime/flagyl - stop vanc as will no longer obtain blood work (per family)  Minimize uncomfortable interventions    MOLST signed in chart. Appreciate palliative help.
83y male with  hypertension, hyperlipidemia, previous stroke, CHF, CAD status post MI and stent, AF (no longer on AC), with PPM, COPD and active smoker who was transferred from [facility] with AMS for ENT evaluation of mastoiditis bot found not have mastoiditis. Found to have acute hypoxic and hypercapnic respiratory failure 2/2 recurrent aspiration pneumonia    Over the weekend 3/4-3/5, the patient continued to decline as per son at bedside. Noted with thick, gurgling secretions with desaturation to low 80s/70s on NC, respiratory distress, waxing/waning level of consciousness, not oriented to person, place, time.     This morning patient is minimally arousable to tactile stimuli, does not open eyes, GCS6, in mild respiratory distress    See GOC from 3/6. Family at this time wishes to pursue comfort measures.     Switch cefepime -> ceftriaxone/flagyl - stopped vanc as will no longer obtain blood work    Minimize uncomfortable interventions    MOLST signed in chart. Appreciate palliative help.
P/w recurrent aspiration pna and possible bacteremia in setting of failure to thrive over last 6 months. Now comfort care.     Today appears slightly dyspneic, minimally responsive    Increase Dilaudid for resp distress/comfort
83M w/ HTN, HLD, CAD s/p PCI, CVA, CHF, Afib off A/C, PPM, COPD and active smoker tranferred from [place] for ENT evaluation of possible mastoiditis  -apprec ENT eval, no clinical signs of acute otitis or mastoiditis at this time, rec Debrox and saline nasal spray  -Acute AMS: r/o infectious etiology, UA equivocal, UCx neg, Blood Cx NGTD, CT chest - b/l upper lobe opacities, may be infectious, will broaden abx to Vanc/Cefepime/Azithro  -PT: ANDRÉS
83M w/ HTN, HLD, CAD s/p PCI, CVA, CHF, Afib off A/C, PPM, COPD and active smoker tranferred from [place] for ENT evaluation of possible mastoiditis  -apprec ENT eval, no clinical signs of acute otitis or mastoiditis at this time, rec Debrox and saline nasal spray  -Acute AMS: r/o infectious etiology, UA equivocal, UCx neg, Blood Cx NGTD, CT chest - b/l upper lobe opacities, may be infectious  Blood culture 3/1 sent at [facility] - growing GPC - awaiting speciation, repeat blood cultures 3/4 testing  Appreciate ID eval, c/w Vanc/Cefepime for now  PT: ANDRÉS
P/w recurrent aspiration pna in setting of failure to thrive over last 6 months. Now comfort care.     Today appears to be initiating [place]-Santoyo breathing, unresponsive to stimuli, GCS 4.    Increase Dilaudid for resp distress/comfort. D/w son at bedside.
83y male with  hypertension, hyperlipidemia, previous stroke, CHF, CAD status post MI and stent, AF (no longer on AC), with PPM, COPD and active smoker who was transferred from [facility] with AMS for ENT evaluation of mastoiditis bot found not have mastoiditis. Found to have acute hypoxic and hypercapnic respiratory failure 2/2 recurrent aspiration pneumonia    Over the weekend, the patient continued to decline as per son at bedside. This morning, noted with thick, gurgling secretions with desaturation to low 80s on NC, respiratory distress, waxing/waning level of consciousness, not oriented to person, place, time. Son at bedside states he is HCP but form at home. As such, held conversation with he patient's additional two children (patient's spouse is passed two years prior). Explained that the most likely etiology is aspiration, the son at bedside explained he has noticed a precipitous decline in ability to perform ADLs over last year, likely accelerated by the passage of the patient's spouse. Inability to drive without getting lost, issues with other ADLs and IADLs. The three children agree that the patient would not want intubation or CPR. Explained that if his oxygen levels continue to drop into the low 80s, that he could potentially pass soon if he doesn't improve with abx (and that aspiration unfortunately not an easily treatable affliction and will continue to occur and likely worsen due to the underlying decline in the patient's health (?undiagnosed dementia). Per further [facility] conversations with the team, the patient's three children are opting for a more comfort-centered approach to the patient's care. The son who lives in [place] is traveling up to visit his dad.    Continue vanc/cefepime/flagyl  Minimize uncomfortable interventions  pall care f/u to further delineate GOC  MOLST signed in chart
83M w/ HTN, HLD, CAD s/p PCI, CVA, CHF, Afib off A/C, PPM, COPD and active smoker tranferred from [place] for ENT evaluation of possible mastoiditis  -apprec ENT eval, no clinical signs of acute otitis or mastoiditis at this time, rec Debrox and saline nasal spray  -Acute AMS: r/o infectious etiology, UA equivocal, UCx neg, Blood Cx NGTD, CT chest - b/l upper lobe opacities, may be infectious, will broaden abx to Vanc/Cefepime/Azithro  Blood culture 3/1 sent at [facility] - growing GPC   Will repeat blood cultures and obtain ID eval   PT: ANDRÉS

## 2023-03-10 NOTE — PROGRESS NOTE ADULT - PROBLEM SELECTOR PLAN 4
Seth Gant at bedside. Support provided.    Case reviewed with primary team and bedside RN.  Thank you for allowing us to participate in your patient's care. Please page [phone] for any questions/concerns. Case reviewed with primary team and bedside RN.  Thank you for allowing us to participate in your patient's care. Please page [phone] for any questions/concerns.
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
## 2023-03-10 NOTE — PROGRESS NOTE ADULT - SUBJECTIVE AND OBJECTIVE BOX
DATE OF SERVICE: 03-10-23 @ 06:16    Patient is a 83y old  Male who presents with a chief complaint of AMS, r/o mastoiditis (09 Mar 2023 17:18)      SUBJECTIVE / OVERNIGHT EVENTS:  No acute events overnight, patient reports feeling well and all questions answered at this time.     Additional Review of Systems:  Denies any other acute sx including f/c, HA, cough, sore throat, eye/ear changes, rhinorrhea, CP, palpitations, SOB, n/v, abdominal pain, back pain, bowel/bladder changes, fatigue, numbness or tingling.    MEDICATIONS  (STANDING):  cefTRIAXone   IVPB 1000 milliGRAM(s) IV Intermittent every 24 hours  influenza  Vaccine (HIGH DOSE) 0.7 milliLiter(s) IntraMuscular once  metroNIDAZOLE  IVPB 500 milliGRAM(s) IV Intermittent every 8 hours  metroNIDAZOLE  IVPB        MEDICATIONS  (PRN):  glycopyrrolate Injectable 0.4 milliGRAM(s) IV Push every 6 hours PRN secretions  HYDROmorphone  Injectable 0.5 milliGRAM(s) IV Push every 2 hours PRN Pain/dysnpea  LORazepam   Injectable 0.2 milliGRAM(s) IV Push every 1 hour PRN Dyspnea      Vital Signs Last 24 Hrs  T(C): 36.6 (09 Mar 2023 22:02), Max: 36.6 (09 Mar 2023 22:02)  T(F): 97.8 (09 Mar 2023 22:02), Max: 97.8 (09 Mar 2023 22:02)  HR: 60 (09 Mar 2023 22:02) (60 - 65)  BP: 101/58 (09 Mar 2023 22:02) (101/58 - 103/57)  BP(mean): --  RR: 19 (09 Mar 2023 22:02) (19 - 19)  SpO2: 96% (09 Mar 2023 22:02) (95% - 96%)    Parameters below as of 09 Mar 2023 22:02  Patient On (Oxygen Delivery Method): nasal cannula  O2 Flow (L/min): 2    CAPILLARY BLOOD GLUCOSE        I&O's Summary    PHYSICAL EXAM:  GENERAL: Chronically ill-appearing, comfortably sleeping  HEAD:  Atraumatic, Normocephalic  EYES: EOMI, PERRLA, conjunctiva and sclera clear  NECK: Supple, No JVD  CHEST/LUNG: clear to auscultation bilaterally; No wheeze or stridor, no respiratory distress  HEART: Regular rate and rhythm; No murmurs, rubs, or gallops  ABDOMEN: Soft, Nondistended; Bowel sounds present  EXTREMITIES:  2+ Peripheral Pulses, No clubbing, cyanosis, or edema  NEUROLOGY: A&Ox0, grimaces to noxious stimuli  SKIN: scattered ecchymoses and skin tears over b/l arms    LABS:                    RADIOLOGY & ADDITIONAL TESTS:    Imaging Personally Reviewed:    Consultant(s) Notes Reviewed:      Care Discussed with Consultants/Other Providers:

## 2023-03-10 NOTE — PROGRESS NOTE ADULT - ASSESSMENT
83y male with  hypertension, hyperlipidemia, previous stroke, CHF, CAD status post MI and stent, AF (no longer on AC), with PPM, COPD and active smoker who was transferred from [facility] with AMS for ENT evaluation of mastoiditis. Does not have mastoiditis, but found to have AMS and aspiration PNA.

## 2023-03-10 NOTE — PROGRESS NOTE ADULT - PAIN ASSESSMENT ADVANCED DEMENTIA: BREATHING
Occasional labored breathing. Short period of hyperventilation

## 2023-03-10 NOTE — PROGRESS NOTE ADULT - PROBLEM SELECTOR PLAN 3
- patient was transferred to [facility] for ENT evaluation of mastoiditis   - ENT stated low concern for acute otitis or mastoiditis at this time  - s/p treatment, now held given comfort measures

## 2023-03-10 NOTE — PROGRESS NOTE ADULT - PROBLEM SELECTOR PLAN 1
- Increase to IV Dilaudid 0.5mg q1 PRN pain/dyspnea  - IV Ativan 0.2mg q1 PRN anxiety/agitation/ refractory dyspnea  - IV Robinul 0.4mg q6 PRN secretions  - Bowel regimen while on opiates: Dulcolax Supp PRN. - Start IV Dilaudid 0.2mg q8 ATC  - IV Dilaudid 0.5mg q1 PRN pain/dyspnea  - IV Ativan 0.2mg q1 PRN anxiety/agitation/ refractory dyspnea  - IV Robinul 0.4mg q6 PRN secretions  - Bowel regimen while on opiates: Dulcolax Supp PRN.

## 2023-03-11 NOTE — CHART NOTE - NSCHARTNOTEFT_GEN_A_CORE
DEATH NOTE    Called to bedside to evaluate the patient for cardiopulmonary arrest     On physical exam, patient did not respond to verbal or noxious stimuli.  No spontaneous respirations.  Absent heart and breath sounds.  Absent radial and carotid pulses.   Pupils are fixed and dilated, no corneal reflex.  EKG rhythm strip shows asystole.   Patient pronounced dead at 12:23am Attending notified.  Family Declined autopsy.
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
## 2023-03-21 LAB
CULTURE RESULTS: SIGNIFICANT CHANGE UP
ORGANISM # SPEC MICROSCOPIC CNT: SIGNIFICANT CHANGE UP
ORGANISM # SPEC MICROSCOPIC CNT: SIGNIFICANT CHANGE UP
SPECIMEN SOURCE: SIGNIFICANT CHANGE UP
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
## 2023-08-04 NOTE — PHYSICAL THERAPY INITIAL EVALUATION ADULT - ADL SKILLS, REHAB EVAL
The Service to Ophthalmology order in workqueue [[id number]] requested on 7/25/2023 has been removed as, unable to contact. Ordering provider has been notified.  Please contact patient, if further communication is needed.     independent

## 2024-01-08 NOTE — PATIENT PROFILE ADULT - NSPROGENBLOODRESTRICT_GEN_A_NUR
Requested medication(s) are due for refill today: Yes  Patient has already received a courtesy refill: No  Other reason request has been forwarded to provider:   
none

## 2024-01-12 NOTE — DISCHARGE NOTE ADULT - NURSING SECTION COMPLETE
Pt. resides alone in an elevator building, with few steps to enter. He has been ambulating household distances independently with RW vs SC. He has not been ambulating outdoors in past 3 months. Pt's son resides a few blocks away and visits daily. Pt. does not have home care services. Patient/Caregiver provided printed discharge information.

## 2024-02-28 NOTE — PROGRESS NOTE ADULT - PROBLEM SELECTOR PROBLEM 5
Problem: Discharge Planning  Goal: Discharge to home or other facility with appropriate resources  2/27/2024 2153 by Elisha Salazar RN  Outcome: Progressing  2/27/2024 0754 by Nydia Jose RN  Outcome: Progressing     Problem: Safety - Adult  Goal: Free from fall injury  2/27/2024 2153 by Elisha Salazar RN  Outcome: Progressing  2/27/2024 0754 by Nydia Jose RN  Outcome: Progressing     Problem: Chronic Conditions and Co-morbidities  Goal: Patient's chronic conditions and co-morbidity symptoms are monitored and maintained or improved  2/27/2024 2153 by Elisha Salazar RN  Outcome: Progressing  2/27/2024 0754 by Nydia Jose RN  Outcome: Progressing     Problem: Skin/Tissue Integrity  Goal: Absence of new skin breakdown  Description: 1.  Monitor for areas of redness and/or skin breakdown  2.  Assess vascular access sites hourly  3.  Every 4-6 hours minimum:  Change oxygen saturation probe site  4.  Every 4-6 hours:  If on nasal continuous positive airway pressure, respiratory therapy assess nares and determine need for appliance change or resting period.  2/27/2024 2153 by Elisha Salazar RN  Outcome: Progressing  2/27/2024 0754 by Nydia Jose, RN  Outcome: Progressing     Problem: Pain  Goal: Verbalizes/displays adequate comfort level or baseline comfort level  2/27/2024 2153 by Elisha Salazar, RN  Outcome: Progressing  2/27/2024 0754 by Nydia Jose, RN  Outcome: Progressing     
CAD (coronary artery disease)
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

## 2024-06-10 NOTE — PATIENT PROFILE ADULT. - BILL OF RIGHTS/ADMISSION INFORMATION PROVIDED TO:
Addended by: PAKO FINNEGAN on: 6/10/2024 09:06 AM     Modules accepted: Level of Service    
Addended by: PAKO FINNEGAN on: 6/10/2024 09:12 AM     Modules accepted: Orders    
Patient

## 2024-10-28 NOTE — ED PROVIDER NOTE - PHYSICAL EXAMINATION
Discharge Planning Assessment   Juan J     Patient Name: Fallon Nur  MRN: [id number]  Today's Date: 10/28/2024    Admit Date: 10/26/2024    Plan: [place] PLAN: 1. [facility] 2. [facility] (Ref[facility] pending) From [facility], does not want to return. Will need Precert and PASRR. Will need transport at [place].       Discharge Needs Assessment       Row Name 10/28/24 1141       Living Environment    People in Home other (see comments)    Unique Family Situation From [facility]    Current Living Arrangements extended care facility    Potentially Unsafe Housing Conditions none    In the past 12 months has the electric, gas, oil, or water company threatened to shut off services in your home? No    Provides Primary Care For no one    Family Caregiver if Needed other (see comments)    Quality of Family Relationships helpful;involved;supportive    Able to Return to Prior Arrangements yes       Resource/Environmental Concerns    Resource/Environmental Concerns none    Transportation Concerns none       Transportation Needs    In the past 12 months, has lack of transportation kept you from medical appointments or from getting medications? no    In the past 12 months, has lack of transportation kept you from meetings, work, or from getting things needed for daily living? No       Food Insecurity    Within the past 12 months, you worried that your food would run out before you got the money to buy more. Never true    Within the past 12 months, the food you bought just didn't last and you didn't have money to get more. Never true       Transition Planning    Patient/Family Anticipates Transition to long-term care facility    Patient/Family Anticipated Services at Transition none    Transportation Anticipated health plan transportation;family or friend will provide       Discharge Needs Assessment    Readmission Within the Last 30 Days no previous admission in last 30 days    Anticipated Changes Related to Illness  none                   Discharge Plan       Row Name 10/28/24 1141       Plan    Plan [place] PLAN: 1. [facility] 2. [facility] (Ref[facility] pending) From [facility], does not want to return. Will need Precert and PASRR. Will need transport at [place].    Patient/Family in Agreement with Plan yes    Plan Comments CM Met with patient and family at bedside, from [facility], Patient and family do not want to return. Many concerns about the care patient recieved. [facility] the [facility] liasion notified. Awaiting call back. DCP report sent to 1. [facility] 2. [facility], messages sent to liasion. Awaiting responses. Awaiting third choice, family need to discuss. Provided  number. Pending Clinical course.                      Continued Care and Services - Admitted Since 10/26/2024       Destination       Service Provider Request Status Services Address Phone Fax Patient Preferred    [facility] Accepted -- [address] [place] [address] [phone] --    [facility] Pending - Request Sent -- [address] [place] [address] [phone] --    [facility] Pending - Request Sent -- [address] [facility] [place] [address] [phone] --                  Selected Continued Care - Prior Encounters Includes continued care and service providers with selected services from prior encounters from 7/28/2024 to 10/28/2024                       Expected Discharge Date and Time       Expected Discharge Date Expected Discharge Time    Oct 29, 2024            Demographic Summary       Row Name 10/28/24 1139       General Information    Admission Type inpatient    Arrived From emergency department    Required Notices Provided Important Message from Medicare    Referral Source admission list    Reason for Consult discharge planning    Preferred Language English       Contact  Information    Permission Granted to Share Info With     Contact Information Obtained for                    Functional Status       Row Name 10/28/24 1141       Functional Status    Usual Activity Tolerance poor    Current Activity Tolerance poor       Physical Activity    On average, how many days per week do you engage in moderate to strenuous exercise (like a brisk walk)? 0 days    On average, how many minutes do you engage in exercise at this level? 0 min    Number of minutes of exercise per week 0       Assessment of Health Literacy    How often do you have someone help you read hospital materials? Sometimes    How often do you have problems learning about your medical condition because of difficulty understanding written information? Sometimes    How often do you have a problem understanding what is told to you about your medical condition? Sometimes    How confident are you filling out medical forms by yourself? Somewhat    Health Literacy Moderate       Functional Status, IADL    Medications completely dependent    Meal Preparation completely dependent    Housekeeping completely dependent    Laundry completely dependent    Shopping completely dependent       Mental Status    General Appearance WDL WDL       Mental Status Summary    Recent Changes in Mental Status/Cognitive Functioning no changes                Met with patient in room wearing PPE:     Maintained distance greater than six feet and spent less than 15 minutes in the room.    Andree Morocho RN   Case Management   [phone]    Constitutional: Thin, chronically ill appearing, answers some questions   Eyes: PERRL EOMI  Head: Normocephalic atraumatic  Mouth: MMM  Cardiac: regular rate and rhythm  Resp: Lungs CTAB  GI: Abd s/nd/nt  Neuro: CN2-12 grossly intact, FREEMAN x 4  Skin: No visible rashes Constitutional: Thin, chronically ill appearing, answers some questions   Eyes: PERRL EOMI  Head: Normocephalic atraumatic  Mouth: MMM, NTTP mastoid bilateral, ears without erythema  Cardiac: regular rate and rhythm  Resp: Lungs CTAB  GI: Abd s/nd/nt  Neuro: CN2-12 grossly intact, FREEMAN x 4  Skin: No visible rashes
